# Patient Record
Sex: FEMALE | Race: BLACK OR AFRICAN AMERICAN | NOT HISPANIC OR LATINO | Employment: UNEMPLOYED | URBAN - METROPOLITAN AREA
[De-identification: names, ages, dates, MRNs, and addresses within clinical notes are randomized per-mention and may not be internally consistent; named-entity substitution may affect disease eponyms.]

---

## 2017-07-13 ENCOUNTER — TRANSFERRED RECORDS (OUTPATIENT)
Dept: HEALTH INFORMATION MANAGEMENT | Facility: CLINIC | Age: 27
End: 2017-07-13

## 2017-07-20 ENCOUNTER — TRANSFERRED RECORDS (OUTPATIENT)
Dept: HEALTH INFORMATION MANAGEMENT | Facility: CLINIC | Age: 27
End: 2017-07-20

## 2017-07-20 LAB
ABO + RH BLD: NORMAL
ABO + RH BLD: NORMAL
BLD GP AB SCN SERPL QL: NORMAL
C TRACH DNA SPEC QL PROBE+SIG AMP: NEGATIVE
HBV SURFACE AG SERPL QL IA: NORMAL
HIV 1+2 AB+HIV1 P24 AG SERPL QL IA: NORMAL
N GONORRHOEA DNA SPEC QL PROBE+SIG AMP: NEGATIVE
PAP SMEAR - HIM PATIENT REPORTED: NEGATIVE
PAP: NORMAL
RUBELLA ABY IGG: 8.15
RUBELLA ANTIBODY IGG QUANTITATIVE: NORMAL IU/ML
T PALLIDUM IGG SER QL: NORMAL

## 2017-08-25 ENCOUNTER — TRANSFERRED RECORDS (OUTPATIENT)
Dept: HEALTH INFORMATION MANAGEMENT | Facility: CLINIC | Age: 27
End: 2017-08-25

## 2017-08-25 LAB — HEMOGLOBIN: 10.5 G/DL (ref 11.7–15.7)

## 2017-09-08 ENCOUNTER — OFFICE VISIT (OUTPATIENT)
Dept: MIDWIFE SERVICES | Facility: CLINIC | Age: 27
End: 2017-09-08
Payer: MEDICAID

## 2017-09-08 VITALS
BODY MASS INDEX: 24.24 KG/M2 | TEMPERATURE: 98.1 F | HEIGHT: 64 IN | SYSTOLIC BLOOD PRESSURE: 112 MMHG | WEIGHT: 142 LBS | DIASTOLIC BLOOD PRESSURE: 60 MMHG

## 2017-09-08 DIAGNOSIS — R30.0 DYSURIA: Primary | ICD-10-CM

## 2017-09-08 DIAGNOSIS — Z3A.20 20 WEEKS GESTATION OF PREGNANCY: ICD-10-CM

## 2017-09-08 LAB
ALBUMIN UR-MCNC: NEGATIVE MG/DL
APPEARANCE UR: ABNORMAL
BACTERIA #/AREA URNS HPF: ABNORMAL /HPF
BILIRUB UR QL STRIP: NEGATIVE
COLOR UR AUTO: YELLOW
GLUCOSE UR STRIP-MCNC: NEGATIVE MG/DL
HGB UR QL STRIP: NEGATIVE
KETONES UR STRIP-MCNC: NEGATIVE MG/DL
LEUKOCYTE ESTERASE UR QL STRIP: NEGATIVE
MUCOUS THREADS #/AREA URNS LPF: PRESENT /LPF
NITRATE UR QL: NEGATIVE
NON-SQ EPI CELLS #/AREA URNS LPF: ABNORMAL /LPF
PH UR STRIP: 5.5 PH (ref 5–7)
RBC #/AREA URNS AUTO: ABNORMAL /HPF
SOURCE: ABNORMAL
SP GR UR STRIP: 1.02 (ref 1–1.03)
SPECIMEN SOURCE: NORMAL
UROBILINOGEN UR STRIP-ACNC: 0.2 EU/DL (ref 0.2–1)
WBC #/AREA URNS AUTO: ABNORMAL /HPF
WET PREP SPEC: NORMAL

## 2017-09-08 PROCEDURE — 81001 URINALYSIS AUTO W/SCOPE: CPT | Performed by: ADVANCED PRACTICE MIDWIFE

## 2017-09-08 PROCEDURE — 87210 SMEAR WET MOUNT SALINE/INK: CPT | Performed by: ADVANCED PRACTICE MIDWIFE

## 2017-09-08 PROCEDURE — 99202 OFFICE O/P NEW SF 15 MIN: CPT | Performed by: ADVANCED PRACTICE MIDWIFE

## 2017-09-08 PROCEDURE — 87086 URINE CULTURE/COLONY COUNT: CPT | Performed by: ADVANCED PRACTICE MIDWIFE

## 2017-09-08 RX ORDER — VITAMIN A ACETATE, BETA CAROTENE, ASCORBIC ACID, CHOLECALCIFEROL, .ALPHA.-TOCOPHEROL ACETATE, DL-, THIAMINE MONONITRATE, RIBOFLAVIN, NIACINAMIDE, PYRIDOXINE HYDROCHLORIDE, FOLIC ACID, CYANOCOBALAMIN, CALCIUM CARBONATE, FERROUS FUMARATE, ZINC OXIDE, CUPRIC OXIDE 3080; 12; 120; 400; 1; 1.84; 3; 20; 22; 920; 25; 200; 27; 10; 2 [IU]/1; UG/1; MG/1; [IU]/1; MG/1; MG/1; MG/1; MG/1; MG/1; [IU]/1; MG/1; MG/1; MG/1; MG/1; MG/1
TABLET, FILM COATED ORAL
COMMUNITY
End: 2017-12-26

## 2017-09-08 RX ORDER — NITROFURANTOIN 25; 75 MG/1; MG/1
100 CAPSULE ORAL 2 TIMES DAILY
Qty: 14 CAPSULE | Refills: 0 | Status: SHIPPED | OUTPATIENT
Start: 2017-09-08 | End: 2017-09-22

## 2017-09-08 NOTE — MR AVS SNAPSHOT
After Visit Summary   9/8/2017    Haritha Bateman    MRN: [id number]           Patient Information     Date Of Birth          1990        Visit Information        Provider Department      9/8/2017 10:00 AM Rylie Palomino APRN CNM [facility] [place]        Today's Diagnoses     Dysuria    -  1    20 weeks gestation of pregnancy          Care Instructions    Bladder Infection (UTI'S)    To help reduce the symptoms of your bladder infection:       Drink 8-10 glasses of water every day      Decrease caffeine, sugar and alcohol      Take all of the medication as it has been prescribed, don't stop before the last dose is gone      You may use OTC Uristat or Pyridium (this can turn your urine orange)  to soothe your bladder and reduce the burning but be aware that it may stain your clothing      Take Ibuprofen as directed for pain (not in pregnancy)      Take Vitamin C:  250-500 mg a day, Zinc: 30-50 mg day      Cranactin (tableted cranberry juice concentrate) take 1-2 tablets every 3-4 hours with plenty of water        To prevent future urinary tract infections:    AVOID CHEMICAL IRRITANTS:  Bath gels, perfumed products, deodorant pads or tampons, douching    CLOTHING: That increases moisture and bacterial growth:  nylon, Lycra, panty liners, tight clothing and thong underwear      ACIDIFY URINE: Cranberry tablets (Cranactin) or juice (naturally sweetened) to acidify urine and decrease bacterial growth.     URINATE:  Frequently and before and after intercourse.    If bladder infections are a common problem for you, consider washing your vaginal area before intercourse as well.       If symptoms persist or you experience fever, chills or back pain, please call:    [facility]   [phone]               Follow-ups after your visit        Follow-up notes from your care team     Return in about 2 weeks (around 9/22/2017) for 1st OB visit with [facility].      Your next 10  appointments already scheduled     Sep 22, 2017 10:00 AM CDT   US OB > 14 WEEKS COMPLETE SINGLE with WEUS1   [facility] ([facility])    [address]  [place] [id number] [phone]           Please bring a list of your medicines (including vitamins, minerals and over-the-counter drugs). Also, tell your doctor about any allergies you may have. Wear comfortable clothes and leave your valuables at home.  If you re less than 20 weeks drink four 8-ounce glasses of fluid an hour before your exam. If you need to empty your bladder before your exam, try to release only a little urine. Then, drink another glass of fluid.  You may have up to two family members in the exam room. If you bring a small child, an adult must be there to care for him or her.  Please call the Imaging Department at your exam site with any questions.            Sep 22, 2017 10:50 AM CDT   New Prenatal with ABIMAEL Bonds CNM, WE TRIAGE   [facility] ([facility]    [address]  [place] [id number] [phone]              Future tests that were ordered for you today     Open Future Orders        Priority Expected Expires Ordered    US OB > 14 Weeks Complete Single Routine 9/22/2017 9/9/2018 9/8/2017            Who to contact     If you have questions or need follow up information about today's clinic visit or your schedule please contact [facility] [place] directly at [phone].  Normal or non-critical lab and imaging results will be communicated to you by MyChart, letter or phone within 4 business days after the clinic has received the results. If you do not hear from us within 7 days, please contact the clinic through MyChart or phone. If you have a critical or abnormal lab result, we will notify you by phone as soon as possible.  Submit refill requests through ResQUhart or call your  "pharmacy and they will forward the refill request to us. Please allow 3 business days for your refill to be completed.          Additional Information About Your Visit        MyChart Information     Guidecentral lets you send messages to your doctor, view your test results, renew your prescriptions, schedule appointments and more. To sign up, go to [email or web address] . Click on \"Log in\" on the left side of the screen, which will take you to the Welcome page. Then click on \"Sign up Now\" on the right side of the page.     You will be asked to enter the access code listed below, as well as some personal information. Please follow the directions to create your username and password.     Your access code is: [email or web address]  Expires: 2017 10:51 AM     Your access code will  in 90 days. If you need help or a new code, please call your [place] clinic or [phone].        Care EveryWhere ID     This is your Care EveryWhere ID. This could be used by other organizations to access your [place] medical records  [id number]        Your Vitals Were     Temperature Height Last Period Breastfeeding? BMI (Body Mass Index)       98.1  F (36.7  C) (Oral) 5' 3.5\" (1.613 m) 2017 No 24.76 kg/m2        Blood Pressure from Last 3 Encounters:   17 112/60    Weight from Last 3 Encounters:   17 142 lb (64.4 kg)              We Performed the Following     UA with Microscopic     Urine Culture Aerobic Bacterial     Wet prep          Today's Medication Changes          These changes are accurate as of: 17 10:53 AM.  If you have any questions, ask your nurse or doctor.               Start taking these medicines.        Dose/Directions    nitroFURantoin (macrocrystal-monohydrate) 100 MG capsule   Commonly known as:  MACROBID   Used for:  Dysuria   Started by:  Rylie Palomino APRN CNM        Dose:  100 mg   Take 1 capsule (100 mg) by mouth 2 times daily   Quantity:  14 capsule   Refills:  0          "   Where to get your medicines      These medications were sent to [id number] - [place], [place] - [address] AT [facility] [address]  [address], [facility] [address]     Phone:  [phone]     nitroFURantoin (macrocrystal-monohydrate) 100 MG capsule                Primary Care Provider    None Specified       No primary provider on file.        Equal Access to Services     ADEOLA IBRAHIM : Hadii aad ku hadasho Soomaali, waaxda luqadaha, qaybta kaalmada adeegyada, waxay idiin haydishan adeeg nam amador . So [facility] [phone].    ATENCIÓN: Si kiana espcharlie, tiene a mishra disposición servicios gratuitos de asistencia lingüística. Llame al [phone].    We comply with applicable federal civil rights laws and [place] laws. We do not discriminate on the basis of race, color, national origin, age, disability sex, sexual orientation or gender identity.            Thank you!     Thank you for choosing [facility]  for your care. Our goal is always to provide you with excellent care. Hearing back from our patients is one way we can continue to improve our services. Please take a few minutes to complete the written survey that you may receive in the mail after your visit with us. Thank you!             Your Updated Medication List - Protect others around you: Learn how to safely use, store and throw away your medicines at www.disposemymeds.org.          This list is accurate as of: 9/8/17 10:53 AM.  Always use your most recent med list.                   Brand Name Dispense Instructions for use Diagnosis    nitroFURantoin (macrocrystal-monohydrate) 100 MG capsule    MACROBID    14 capsule    Take 1 capsule (100 mg) by mouth 2 times daily    Dysuria       PRENATAL PLUS 27-1 MG Tabs

## 2017-09-08 NOTE — PROGRESS NOTES
Wet prep negative. Made patient aware she would only receive a call if something came back positive.     Rylie VARGHESE CNM

## 2017-09-08 NOTE — PATIENT INSTRUCTIONS
Bladder Infection (UTI'S)    To help reduce the symptoms of your bladder infection:       Drink 8-10 glasses of water every day      Decrease caffeine, sugar and alcohol      Take all of the medication as it has been prescribed, don't stop before the last dose is gone      You may use OTC Uristat or Pyridium (this can turn your urine orange)  to soothe your bladder and reduce the burning but be aware that it may stain your clothing      Take Ibuprofen as directed for pain (not in pregnancy)      Take Vitamin C:  250-500 mg a day, Zinc: 30-50 mg day      Cranactin (tableted cranberry juice concentrate) take 1-2 tablets every 3-4 hours with plenty of water        To prevent future urinary tract infections:    AVOID CHEMICAL IRRITANTS:  Bath gels, perfumed products, deodorant pads or tampons, douching    CLOTHING: That increases moisture and bacterial growth:  nylon, Lycra, panty liners, tight clothing and thong underwear      ACIDIFY URINE: Cranberry tablets (Cranactin) or juice (naturally sweetened) to acidify urine and decrease bacterial growth.     URINATE:  Frequently and before and after intercourse.    If bladder infections are a common problem for you, consider washing your vaginal area before intercourse as well.       If symptoms persist or you experience fever, chills or back pain, please call:    [facility]   [phone]

## 2017-09-08 NOTE — PROGRESS NOTES
"SUBJECTIVE: Haritha Bateman is a 27 year old female who presents today with UTI symptoms: vaginal discharge, painful urination, cramps, x1 day.  Reports being ~18 weeks pregnant. Currently gets prenatal care at [facility]. Planning to transfer care to [facility].    URINARY TRACT SYMPTOMS     Onset: 1 day ago    Description:   Painful urination (Dysuria): Yes  Blood in urine (Hematuria): No  Urgency/Frequency: No    Progression of Symptoms:  same    Accompanying Signs & Symptoms:  Fever/chills: No  Flank pain: No  Nausea and vomiting: No  Any vaginal symptoms: Yes  Abdominal/Pelvic Pain: Yes Constant, throbbing lower abdominal pain   History:   History of frequent UTI's: No  History of kidney stones: No  Sexually Active: Yes  Possibility of pregnancy: Yes - Pt is pregnant  Contraceptive type:  none    Precipitating factors:            Therapies Tried and outcome: None      There is no problem list on file for this patient.    No past medical history on file.  No past surgical history on file.  Current Outpatient Prescriptions   Medication Sig Dispense Refill     Prenatal Vit-Fe Fumarate-FA (PRENATAL PLUS) 27-1 MG TABS        No Known Allergies    Health maintenance updated:  yes    ROS:  12 point review of systems negative other than symptoms noted below.  Genitourinary: Cramps, Painful Urination and Vaginal Discharge    PHYSICAL EXAM:  Vitals: /60  Temp 98.1  F (36.7  C) (Oral)  Ht 5' 3.5\" (1.613 m)  Wt 142 lb (64.4 kg)  LMP 05/04/2017  Breastfeeding? No  BMI 24.76 kg/m2  BMI= Body mass index is 24.76 kg/(m^2).  Patient appears well, afebrile.   ABD: Soft with supra pubic pain or tenderness  BACK: Neg CVAT.     No components found for: URINE      ASSESSMENT/PLAN:      ICD-10-CM    1. Dysuria R30.0 UA with Microscopic     Urine Culture Aerobic Bacterial     Wet prep   2. 20 weeks gestation of pregnancy Z3A.20 US OB > 14 Weeks Complete Single     Results for orders placed or performed in visit on " 09/08/17   UA with Microscopic   Result Value Ref Range    Color Urine Yellow     Appearance Urine Slightly Cloudy     Glucose Urine Negative NEG^Negative mg/dL    Bilirubin Urine Negative NEG^Negative    Ketones Urine Negative NEG^Negative mg/dL    Specific Gravity Urine 1.025 1.003 - 1.035    pH Urine 5.5 5.0 - 7.0 pH    Protein Albumin Urine Negative NEG^Negative mg/dL    Urobilinogen Urine 0.2 0.2 - 1.0 EU/dL    Nitrite Urine Negative NEG^Negative    Blood Urine Negative NEG^Negative    Leukocyte Esterase Urine Negative NEG^Negative    Source Midstream Urine     WBC Urine O - 2 OTO2^O - 2 /HPF    RBC Urine O - 2 OTO2^O - 2 /HPF    Squamous Epithelial /LPF Urine Few FEW^Few /LPF    Bacteria Urine Moderate (A) NEG^Negative /HPF    Mucous Urine Present (A) NEG^Negative /LPF       COUNSELING:  Given symptoms will treat for a UTI. UC pending  Prescription sent for Macrobid 100mg PO BID X 7 daysPush fluids    May use Uristat or other OTC med for dysuria  Reinforced the importance of completing this course of antibiotics   Use a probiotic when taking antibiotics  Handout provided regarding UTI prevention  RTC with continued symptoms or concerns  Transferring care to [facility]. Encouraged to send prenatal records to [facility] for review before next appointment.   Next appointment in 2 weeks with midwives; Fetal anatomy screen scheduled    Rylie VARGHESE CNM

## 2017-09-09 LAB
BACTERIA SPEC CULT: NORMAL
Lab: NORMAL
SPECIMEN SOURCE: NORMAL

## 2017-09-11 NOTE — PROGRESS NOTES
Please call to discuss UC results. She may continue with the Macrobid if desired, but it is not necessary to complete the course of antibiotics.     Rylie VARGHESE CNM

## 2017-09-22 ENCOUNTER — RADIANT APPOINTMENT (OUTPATIENT)
Dept: ULTRASOUND IMAGING | Facility: CLINIC | Age: 27
End: 2017-09-22
Attending: ADVANCED PRACTICE MIDWIFE
Payer: MEDICAID

## 2017-09-22 ENCOUNTER — PRENATAL OFFICE VISIT (OUTPATIENT)
Dept: MIDWIFE SERVICES | Facility: CLINIC | Age: 27
End: 2017-09-22
Attending: ADVANCED PRACTICE MIDWIFE
Payer: MEDICAID

## 2017-09-22 VITALS
BODY MASS INDEX: 24.65 KG/M2 | DIASTOLIC BLOOD PRESSURE: 72 MMHG | SYSTOLIC BLOOD PRESSURE: 104 MMHG | HEIGHT: 64 IN | HEART RATE: 77 BPM | WEIGHT: 144.4 LBS

## 2017-09-22 DIAGNOSIS — Z34.02 ENCOUNTER FOR SUPERVISION OF NORMAL FIRST PREGNANCY IN SECOND TRIMESTER: Primary | ICD-10-CM

## 2017-09-22 DIAGNOSIS — Z3A.20 20 WEEKS GESTATION OF PREGNANCY: ICD-10-CM

## 2017-09-22 PROCEDURE — 76805 OB US >/= 14 WKS SNGL FETUS: CPT | Performed by: OBSTETRICS & GYNECOLOGY

## 2017-09-22 PROCEDURE — 99213 OFFICE O/P EST LOW 20 MIN: CPT | Performed by: ADVANCED PRACTICE MIDWIFE

## 2017-09-22 ASSESSMENT — ANXIETY QUESTIONNAIRES
2. NOT BEING ABLE TO STOP OR CONTROL WORRYING: NOT AT ALL
5. BEING SO RESTLESS THAT IT IS HARD TO SIT STILL: NOT AT ALL
6. BECOMING EASILY ANNOYED OR IRRITABLE: NOT AT ALL
7. FEELING AFRAID AS IF SOMETHING AWFUL MIGHT HAPPEN: NOT AT ALL
1. FEELING NERVOUS, ANXIOUS, OR ON EDGE: NOT AT ALL
GAD7 TOTAL SCORE: 0
IF YOU CHECKED OFF ANY PROBLEMS ON THIS QUESTIONNAIRE, HOW DIFFICULT HAVE THESE PROBLEMS MADE IT FOR YOU TO DO YOUR WORK, TAKE CARE OF THINGS AT HOME, OR GET ALONG WITH OTHER PEOPLE: NOT DIFFICULT AT ALL
3. WORRYING TOO MUCH ABOUT DIFFERENT THINGS: NOT AT ALL

## 2017-09-22 ASSESSMENT — PATIENT HEALTH QUESTIONNAIRE - PHQ9
SUM OF ALL RESPONSES TO PHQ QUESTIONS 1-9: 0
5. POOR APPETITE OR OVEREATING: NOT AT ALL

## 2017-09-22 NOTE — PATIENT INSTRUCTIONS
Thank you for coming to see the Midwives at the   [facility]!      We will notify you about your labs that were drawn today once we get the results back or if you have [email or web address] they will be posted there as well      We will call you personally with results that require further discussion      If any referrals were ordered today you should be getting a call in the next week or you may need to call the number listed with your referral to schedule.            If you need any refills of medications please call your pharmacy and they will contact us      If you have any concerns about today's visit or wish to schedule another appointment, or have an urgent medical concern please call our office [phone]. You can also make appointments through [email or web address]        If you have a medical emergency please call 911.    Because you are pregnant, we have additional resources for you:      You may call our consulting RN's during normal business hours for non-urgent questions about your pregnancy at [phone]      After hours you may also call the clinic number above to be connected with [place]'s after hours triage RN.  She can page the midwife on call if needed. There is always a midwife on call 24 hours a day.    Prenatal Reminders:    Before 14 weeks: Dating ultrasound, Genetic testing       This ultrasound helps us determine your dates accurately. Verifi can be drawn anytime after 10 weeks of gestation  16 weeks: Optional genetic testing (quad screen) or single AFP       This testing helps understand your baby's risk for some genetic abnormalities.  20 weeks:  Screening ultrasound (fetal survey)       This testing will look for early growth abnormalities, and may tell the baby's sex if you wish to find out.  28 weeks: One hour sugar test (GCT), hemoglobin and platelets       This test helps identify diabetes of pregnancy or gestational diabetes.  We also look      at the iron in your blood and how well your  blood clots.  28 - 36 weeks: Tetanus shot (Tdap)       This shot helps protect you and your baby from tetanus and whooping cough.  36 weeks and later: Group B Strep test (GBS)       This test helps predict if you need antibiotics in labor to prevent infection in your baby.  Anytime September to April:  Flu shot       This shot helps protect you and your family from the flu.  This is especially important during pregnancy        Any time during or after your pregnancy you may experience increased depression and/or mood changes.    We are here to support you. Please contact us if you are:    Feeling anxious    Overwhelmed or sad     Trouble sleeping    Crying uncontrollably    Trouble caring for yourself or baby.    The typical schedule after your first visit today you can expect:     Visit 2 - 12-16 weeks  Visit 3 - 20 weeks  Visit 4 - 24 weeks  Visit 5 - 28 weeks  Visit 6 - 32 weeks  Visit 7 - 34 weeks  Visit 8 - 36 weeks  Weekly after 36 weeks until delivery.    If anything comes up between your visits or you have concerns please don't hesitate to contact us.    Secure access to your medical record:  Use Mobilinga (secure email communication and access to your chart) to send your primary care provider a message or make an appointment. Ask someone on your Team how to sign up for Mobilinga. To log on to [employer] or for more information in Mobilinga please visit the website at [email or web address].       Certified Nurse Midwife (CNM) Team  Elinor VARGHESE, AMY VARGHESE, AMY Hollis DNP, APRN, AMY VARGHESE, ABIMAEL Angel, ANAMARIA-BC, AMY Presley, SUZETTE, APRN, CNM      Again, thank you for choosing the midwives at [facility].  We are excited to be a part of your pregnancy. Please let us know how we can best partner with you to improve your and your family's health.

## 2017-09-22 NOTE — NURSING NOTE
[facility] Obstetrical Risk History    Patient presents for new OB labs and teaching.      1. Please indicate any condition you have or have had in the past:  none  2. Do you smoke?  No  If yes, how many packs/day?   3. Do you drink alcoholic beverages? No  If yes, how often?  What type?   4. List any medications taken since your last period: prenatal/ macrobid  5. List any recreational drugs (cocaine, marijuana, etc. used since your last period:None    6. List any chemical or radiation exposure that you've encountered: None  7. Are you on a restricted diet? No  If yes, please describe:  Do you have any Yarsanism objections to any form of treatment? No    GENETIC SCREENING    These questions apply to you, the baby's father, or anyone in either family with:    1. Patient's age 35 or greater at delivery? No  2. Maldivian, Estonian, Mediterranean ancestry? No  3. Neural Tube Defect (Meningomyelocele, Spina Bifida, or Anencephaly)? No  4. Orthodoxy, [place] or history of Reyes-Sachs disease? No  5. Down's Syndrome?   No  6. Hemophilia or clotting disorder? No  7. Muscular Dystrophy? No  8. Cystic Fibrosis? No  9. [place]'s Chorea? No  10. Mental Retardation? No  11. 3 or more miscarriages or a stillborn? No  12. Other inherited disease or chromosomal disorder? No  13. Have you or the baby's father had a child born with a birth defect? No  14. Did you or the baby's father have a birth defect yourselves? No    Do you have any other concerns about birth defects? No

## 2017-09-22 NOTE — MR AVS SNAPSHOT
After Visit Summary   9/22/2017    Haritha Bateman    MRN: [id number]           Patient Information     Date Of Birth          1990        Visit Information        Provider Department      9/22/2017 10:50 AM Romi Goldsmith APRN CNM; WE TRIAGE [facility]        Today's Diagnoses     Encounter for supervision of normal first pregnancy in second trimester    -  1      Care Instructions    Thank you for coming to see the Midwives at the   [facility]!      We will notify you about your labs that were drawn today once we get the results back or if you have BRAINhart they will be posted there as well      We will call you personally with results that require further discussion      If any referrals were ordered today you should be getting a call in the next week or you may need to call the number listed with your referral to schedule.            If you need any refills of medications please call your pharmacy and they will contact us      If you have any concerns about today's visit or wish to schedule another appointment, or have an urgent medical concern please call our office [phone]. You can also make appointments through [employer]        If you have a medical emergency please call 911.    Because you are pregnant, we have additional resources for you:      You may call our consulting RN's during normal business hours for non-urgent questions about your pregnancy at [phone]      After hours you may also call the clinic number above to be connected with [place]'s after hours triage RN.  She can page the midwife on call if needed. There is always a midwife on call 24 hours a day.    Prenatal Reminders:    Before 14 weeks: Dating ultrasound, Genetic testing       This ultrasound helps us determine your dates accurately. Verifi can be drawn anytime after 10 weeks of gestation  16 weeks: Optional genetic testing (quad screen) or single AFP       This  testing helps understand your baby's risk for some genetic abnormalities.  20 weeks:  Screening ultrasound (fetal survey)       This testing will look for early growth abnormalities, and may tell the baby's sex if you wish to find out.  28 weeks: One hour sugar test (GCT), hemoglobin and platelets       This test helps identify diabetes of pregnancy or gestational diabetes.  We also look      at the iron in your blood and how well your blood clots.  28 - 36 weeks: Tetanus shot (Tdap)       This shot helps protect you and your baby from tetanus and whooping cough.  36 weeks and later: Group B Strep test (GBS)       This test helps predict if you need antibiotics in labor to prevent infection in your baby.  Anytime September to April:  Flu shot       This shot helps protect you and your family from the flu.  This is especially important during pregnancy        Any time during or after your pregnancy you may experience increased depression and/or mood changes.    We are here to support you. Please contact us if you are:    Feeling anxious    Overwhelmed or sad     Trouble sleeping    Crying uncontrollably    Trouble caring for yourself or baby.    The typical schedule after your first visit today you can expect:     Visit 2 - 12-16 weeks  Visit 3 - 20 weeks  Visit 4 - 24 weeks  Visit 5 - 28 weeks  Visit 6 - 32 weeks  Visit 7 - 34 weeks  Visit 8 - 36 weeks  Weekly after 36 weeks until delivery.    If anything comes up between your visits or you have concerns please don't hesitate to contact us.    Secure access to your medical record:  Use [employer] (secure email communication and access to your chart) to send your primary care provider a message or make an appointment. Ask someone on your Team how to sign up for [employer]. To log on to [employer] or for more information in [employer] please visit the website at [email or web address].       Certified Nurse Midwife (CNM) Team  AMY Garay  AMY Hollis DNP, APRMARIKA, AMY VARGHESE, ABIMAEL Angel, CATHERINE-BC, CNPROMISE Presley, SUZETTE, APRN, CNPROMISE      Again, thank you for choosing the midwives at [facility].  We are excited to be a part of your pregnancy. Please let us know how we can best partner with you to improve your and your family's health.              Follow-ups after your visit        Your next 10 appointments already scheduled     Oct 17, 2017  3:40 PM CDT   ESTABLISHED PRENATAL with ABIMAEL Lopez CNM   [facility] ([facility])    [address] [place] [address] [address]  [facility] [address]   [phone]            Nov 14, 2017  2:50 PM CST   Glucose Tolerance Test with [facility]   [facility] ([facility])    [address] [place] [address] [address]  [facility] [address] [phone]            Nov 14, 2017  3:00 PM CST   ESTABLISHED PRENATAL with ABIMAEL Lopez CNM   [facility] ([facility])    [address] [place] [address] [address]  [facility] [address]   [phone]              Future tests that were ordered for you today     Open Future Orders        Priority Expected Expires Ordered    TSH Routine  9/22/2018 9/22/2017            Who to contact     If you have questions or need follow up information about today's clinic visit or your schedule please contact [facility] directly at [phone].  Normal or non-critical lab and imaging results will be communicated to you by MyChart, letter or phone within 4 business days after the clinic has received the results. If you do not hear from us within 7 days, please contact the clinic through MyChart or phone. If you have a critical or abnormal lab result, we will notify you by phone as soon as possible.  Submit refill requests through [employer] or call your pharmacy and they will forward the refill  "request to us. Please allow 3 business days for your refill to be completed.          Additional Information About Your Visit        MyChart Information     [employer] lets you send messages to your doctor, view your test results, renew your prescriptions, schedule appointments and more. To sign up, go to [email or web address] . Click on \"Log in\" on the left side of the screen, which will take you to the Welcome page. Then click on \"Sign up Now\" on the right side of the page.     You will be asked to enter the access code listed below, as well as some personal information. Please follow the directions to create your username and password.     Your access code is: [email or web address]  Expires: 2017 10:51 AM     Your access code will  in 90 days. If you need help or a new code, please call your [facility] clinic or [phone].        Care EveryWhere ID     This is your Care EveryWhere ID. This could be used by other organizations to access your [facility] medical records  [id number]        Your Vitals Were     Pulse Height Last Period BMI (Body Mass Index)          77 5' 4\" (1.626 m) 2017 24.79 kg/m2         Blood Pressure from Last 3 Encounters:   17 104/72   17 112/60    Weight from Last 3 Encounters:   17 144 lb 6.4 oz (65.5 kg)   17 142 lb (64.4 kg)              We Performed the Following     ABO and Rh     Anti Treponema     Chlamydia trachomatis PCR     Conventional pap smear, diagnostic     Hepatitis B surface antigen     HIV Antigen Antibody Combo     Neisseria gonorrhoeae PCR     OB hemoglobin     Rubella Antibody IgG Quantitative        Primary Care Provider    None Specified       No primary provider on file.        Equal Access to Services     [facility] PATRICE : Hadandrew Hamilton, falguni smith, cayetano abdullahi. So [facility] [phone].    ATENCIÓN: Si habla español, tiene a mishra disposición servicios gratuitos de " asistencia lingüística. Sherin al [phone].    We comply with applicable federal civil rights laws and [place] laws. We do not discriminate on the basis of race, color, national origin, age, disability sex, sexual orientation or gender identity.            Thank you!     Thank you for choosing [facility]  for your care. Our goal is always to provide you with excellent care. Hearing back from our patients is one way we can continue to improve our services. Please take a few minutes to complete the written survey that you may receive in the mail after your visit with us. Thank you!             Your Updated Medication List - Protect others around you: Learn how to safely use, store and throw away your medicines at www.disposemymeds.org.          This list is accurate as of: 9/22/17 11:39 AM.  Always use your most recent med list.                   Brand Name Dispense Instructions for use Diagnosis    PRENATAL PLUS 27-1 MG Tabs

## 2017-09-22 NOTE — PROGRESS NOTES
Haritha Bateman is a 27 year old  Stateless,  who is not a previous CNM patient. She presents for a transfer of care OB Visit. This was a planned pregnancy.     FOB is Leoncio who is in good health.  STEVIE IS actively involved in relationship and this pregnancy. Here with Leoncio and her sister and 9 month old nephew. She has no questions or concerns. Had 2 prenatal visits at her previous clinic.       She has not had bleeding since her LMP.    She denies abdominal pain since her LMP.  She has had nausea.  has not had vomiting.  Any personal or family history of blood clots? No  History of sickle cell anemia or trait? No- Had negative screening at last clinic         Patient's last menstrual period was 2017. Estimated Date of Delivery: 2018 Ultrasound consistent with LMP.    MENSTRUAL HISTORY    frequency: every 28 days  Last PAP:  17 NIL  History of abnormal Pap?  No    Health maintenance updated:  yes        Current medications are:    Current Outpatient Prescriptions:      Prenatal Vit-Fe Fumarate-FA (PRENATAL PLUS) 27-1 MG TABS, , Disp: , Rfl:      INFECTION HISTORY  HIV: No  Hepatitis B: No  Hepatitis C: No  Tuberculosis: No  Genital Herpes self: no  Herpes partner:  no  Chlamydia:  no  Gonorrhea:  no  HPV: No  BV:  No  Syphilis:  No  Chicken Pox: As a child      OB HISTORY  Obstetric History       T0      L0     SAB0   TAB0   Ectopic0   Multiple0   Live Births0       # Outcome Date GA Lbr Delfin/2nd Weight Sex Delivery Anes PTL Lv   1 Current                   PERSONAL HISTORY  Exercise Habits:  walking irregularly days per week.  Employment: Unemployed.   Travel plans:  are none planned.   Diet: eats regular meals  Abuse concerns? No  Hgb A1c screen:  BMI > 30: No, 1st degree family DM: No, History of GDM: No, PCOS: No, High risk ethnicity: No    Social History     Social History     Marital status:      Spouse name: N/A     Number of children: 0     Years of  "education: N/A     Occupational History     Not on file.     Social History Main Topics     Smoking status: Never Smoker     Smokeless tobacco: Never Used     Alcohol use No     Drug use: No     Sexual activity: Yes     Partners: Male     Birth control/ protection: None     Other Topics Concern     Not on file     Social History Narrative         She  reports that she has never smoked. She has never used smokeless tobacco.      STD testing offered?  Declined  Last PHQ-9 score on record =   PHQ-9 SCORE 2017   Total Score 0     Last GAD7 score on record =   BAKARI-7 SCORE 2017   Total Score 0     Alcohol Score = 0  Referral/Meds needed? no    PAST MEDICAL/SURGICAL HISTORY  History reviewed. No pertinent past medical history.  History reviewed. No pertinent surgical history.    FAMILY HISTORY  Family History   Problem Relation Age of Onset     Hypertension Mother      Hypertension Paternal Grandmother      Other Cancer Paternal Grandmother          ROS:  12 point review of systems negative other than symptoms noted below.  Gastrointestinal: Heartburn      PHYSICAL EXAM  Vitals: /72  Pulse 77  Ht 5' 4\" (1.626 m)  Wt 144 lb 6.4 oz (65.5 kg)  LMP 2017  BMI 24.79 kg/m2  BMI= Body mass index is 24.79 kg/(m^2).     GENERAL:  27 year old pleasant pregnant female, alert, cooperative and well groomed.  NECK:  Thyroid without enlargement and nodules.  Lymph nodes not palpable.   LUNGS:  Clear to auscultation.  BREAST:  Deferred  HEART:  RRR without murmur.  ABDOMEN: Soft without masses or tenderness.  No scars noted..  Pelvic deferred 20 weeks of gestation  LOWER EXTREMITIES: No edema. No significant varicosities.    ASSESSMENT/PLAN:    IUP at 20w1d    ICD-10-CM    1. Encounter for supervision of normal first pregnancy in second trimester Z34.02 TSH        consult for US for AMA patients: NA  Genetic Testing reviewed and discussed, patient declines    COUNSELING    Instructed on use of triage " nurse line and contacting the on call CNM after hours in an emergency.     Reviewed CNM philosophy, call schedule for labor and delivery, and FSH for delivery    1st OB handout given outlining appointment spacing and CNM information    Reviewed exercise and nutrition    Recommend to gain 25-35 pounds with her pregnancy.    Discussed OTC medications. OB med list given    Encouraged patient to take PNV's/DHA    Travel precautions discussed, no air travel after 36 weeks and Zika Virus discussed    Reviewed normal labs from first OB clinic    Does patient desire a RN home visit from the [facility]?  No    If yes, paperwork completed?      Will return to the clinic in 4 weeks for her next routine prenatal check.  Will call to be seen sooner if problems arise.    ABIMAEL Yusuf, CNM

## 2017-09-23 ASSESSMENT — ANXIETY QUESTIONNAIRES: GAD7 TOTAL SCORE: 0

## 2017-09-24 NOTE — PROGRESS NOTES
Results discussed directly with patient while patient was present. Any further details documented in the note.   ABIMAEL Aguirre CNM

## 2017-10-09 ENCOUNTER — TELEPHONE (OUTPATIENT)
Dept: NURSING | Facility: CLINIC | Age: 27
End: 2017-10-09

## 2017-10-09 NOTE — TELEPHONE ENCOUNTER
22w4d, JOSEPH 2/8/18. Pt has not had BM for 3 days. Pt is pushing so hard that she is having some rectal bleeding. Pt has increased fluids and is eating fresh fruits and vegetables. Pt tried Milk of magnesia which did not cause BM. Pt informed to try glycerin suppository. Pt informed to call back if no BM to day. Pt also informed to start Colace once has moved bowels. Pt stated bleeding is coming from rectum not vagina. Small amount of blood on toilet tissue. Denies cramping.

## 2017-10-17 ENCOUNTER — PRENATAL OFFICE VISIT (OUTPATIENT)
Dept: MIDWIFE SERVICES | Facility: CLINIC | Age: 27
End: 2017-10-17
Payer: MEDICAID

## 2017-10-17 VITALS — DIASTOLIC BLOOD PRESSURE: 74 MMHG | WEIGHT: 155 LBS | BODY MASS INDEX: 26.61 KG/M2 | SYSTOLIC BLOOD PRESSURE: 112 MMHG

## 2017-10-17 DIAGNOSIS — Z23 NEED FOR PROPHYLACTIC VACCINATION AND INOCULATION AGAINST INFLUENZA: ICD-10-CM

## 2017-10-17 DIAGNOSIS — Z34.02 ENCOUNTER FOR SUPERVISION OF NORMAL FIRST PREGNANCY IN SECOND TRIMESTER: Primary | ICD-10-CM

## 2017-10-17 PROCEDURE — 90471 IMMUNIZATION ADMIN: CPT | Performed by: NURSE PRACTITIONER

## 2017-10-17 PROCEDURE — 90686 IIV4 VACC NO PRSV 0.5 ML IM: CPT | Performed by: NURSE PRACTITIONER

## 2017-10-17 PROCEDURE — 99213 OFFICE O/P EST LOW 20 MIN: CPT | Mod: 25 | Performed by: NURSE PRACTITIONER

## 2017-10-17 NOTE — PROGRESS NOTES
Injectable Influenza Immunization Documentation    1.  Is the person to be vaccinated sick today?   No    2. Does the person to be vaccinated have an allergy to a component   of the vaccine?   No    3. Has the person to be vaccinated ever had a serious reaction   to influenza vaccine in the past?   No    4. Has the person to be vaccinated ever had Guillain-Barré syndrome?   No    Form completed by Karen Green CMA

## 2017-10-17 NOTE — MR AVS SNAPSHOT
After Visit Summary   10/17/2017    Haritha Bateman    MRN: [id number]           Patient Information     Date Of Birth          1990        Visit Information        Provider Department      10/17/2017 3:40 PM [facility]        Today's Diagnoses     Encounter for supervision of normal first pregnancy in second trimester    -  1      Care Instructions    SIGNS OF  LABOR    Labor is  if it happens more than three weeks before your due date.    It can be hard to know if you are in labor, since the symptoms can be like the normal feelings of pregnancy.  Often, the only difference is the symptoms increase or they don't go away.     Signs of  labor can include:      Contractions which can feel like period cramps or gas pain.  You may feel it in the lower part of your abdomen, in your back, or as a pressure feeling in your bottom.  It is often regular, coming every 5 or 10 minutes, and  lasting about 30-60 seconds. Some contractions are normal during pregnancy ([place] rico contractions) but if you are feeling more than 5-6 in one hour that is NOT normal    If this occurs empty your bladder, then drink 2-3 glasses of water, eat a snack, and lay down on your left side. Put your hand on your abdomen to count the contractions.  If after one hour of resting you have still had 5-6 contractions call your clinic right away.      If you feel a pop, gush, or trickle of fluid it may mean that your bag of water has broken and you should contact the clinic       You may also experience loose stools and/or rectal pressure       Listen to your body, if something doesn't seem right please call us at the clinic    Risk Factors      Previous  delivery    Bacterial Vaginosis- if you notice a fishy smell to your discharge or experience vaginal itching/discomfort you should be evaluated for infection    Smoking    Drug abuse    Adolescent (teen)  pregnancy or advanced maternal age (AMA) age 35 and over    Dehydration (this may not cause  labor but it can cause contractions)    If you think you are in  labor we may do some lab testing in the clinic or send you to the hospital for evaluation    Please call us if you are concerned you are in  labor.    [facility]  [phone]    GESTATIONAL DIABETES SCREENING    All pregnant women are screened at least once for diabetes as part of their prenatal care. A woman has gestational diabetes if she has high blood sugars for the first time during pregnancy.      Diabetes can harm your health and the health of your baby.  But if we find the diabetes early in pregnancy we will watch your health closely and prevent further problems.       We will check for gestational diabetes during 28 week visit. Please note you can not do this prior to 24 weeks of gestation.      Plan to spend about an hour at the clinic.  When you check in let us know that you will be having your diabetes screening that day.       We will give you a 50 gram glucose drink that you have 5 minutes to consume.  Exactly one hour later you will have draw blood from your arm to check your blood sugar level.      We will call you to let you know if your results are normal.  If the results are normal no more testing will be needed.  If your results are not normal we will discuss follow up testing with you.        You may eat prior to the testing but it is not recommended to eat or drink very sweet things such as pop, juice, candy or dessert type foods.  Eat a high protein, low carb meals prior to testing.    If you have any questions please call:    [facility]    [phone]    Iron Rich Foods  Iron is an important mineral for good health. Without enough iron you can get sick easily, get tired, and babies/children need iron for healthy brain development.    Recommended amount of Iron for Women  Ages 14-18  15  mg  Ages 19-49  18 mg   Over 50  8 mg  Pregnancy  27 mg  Breastfeeding  9 mg  Vegetarians   33 mg    There is extra iron in multivitamins and prenatal vitamins. Sometimes women can have a lower hemoglobin (part of your blood that carries oxygen) after menses and when pregnant. Adding some of these iron rich foods to your diet can help you feel better, bring your iron levels up without supplementing with iron pills or liquids. If you are already supplementing with iron these food will only help!    Iron Rich Foods:    Meat (2.5 oz servings range anywhere from 0.6-21 mg of Iron)  Clams      Liver (chicken/pork)     Oysters    Mussels       Beef liver    Beef       Shrimp     Sardines     Tuna/herring/mackerel   Chicken  Pork     Turkey/Lamb  Godfrey     Flounder/sole  Vegtables  Dark green leafy vegetables like kale/mixed greens /swiss chard  Broccoli      Asparagus  Green peas      Beets  Potato (with skin)  Beans   Chickpeas      Mena Beans  Soy beans      Kidney beans   Lentils       Refried beans   Grains  Whole wheat bread     Bagels  Pasta (enriched)     Quinoa  Cereal       Shredded wheat  Cream of wheat  (12 mg)    Oatmeal  Pearled barley      Wheat germ (toasted)  Other  pumpkin seeds     Tofu  Raisins       Peanut butter  Tahini        Eggs  Sour Cherries      Prune juice                Follow-ups after your visit        Follow-up notes from your care team     Return in about 4 weeks (around 11/14/2017) for Prenatal visit.      Your next 10 appointments already scheduled     Nov 14, 2017  2:50 PM CST   Glucose Tolerance Test with [facility]   [facility] [place] ([facility] [place])    [address] [id number] [phone]            Nov 14, 2017  3:00 PM CST   ESTABLISHED PRENATAL with ABIMAEL Lopez CNM   [facility] [place] ([facility] [place])    [address] [address]  "  [phone]              Who to contact     If you have questions or need follow up information about today's clinic visit or your schedule please contact [facility] directly at [phone].  Normal or non-critical lab and imaging results will be communicated to you by MyChart, letter or phone within 4 business days after the clinic has received the results. If you do not hear from us within 7 days, please contact the clinic through MyChart or phone. If you have a critical or abnormal lab result, we will notify you by phone as soon as possible.  Submit refill requests through [employer] or call your pharmacy and they will forward the refill request to us. Please allow 3 business days for your refill to be completed.          Additional Information About Your Visit        [employer] Information     [employer] lets you send messages to your doctor, view your test results, renew your prescriptions, schedule appointments and more. To sign up, go to [email or web address] . Click on \"Log in\" on the left side of the screen, which will take you to the Welcome page. Then click on \"Sign up Now\" on the right side of the page.     You will be asked to enter the access code listed below, as well as some personal information. Please follow the directions to create your username and password.     Your access code is: [email or web address]  Expires: 2017 10:51 AM     Your access code will  in 90 days. If you need help or a new code, please call your [place] clinic or [phone].        Care EveryWhere ID     This is your Care EveryWhere ID. This could be used by other organizations to access your [place] medical records  [id number]        Your Vitals Were     Last Period BMI (Body Mass Index)                2017 26.61 kg/m2           Blood Pressure from Last 3 Encounters:   10/17/17 112/74   17 104/72   17 112/60    Weight from Last 3 Encounters:   10/17/17 155 lb (70.3 kg)   17 144 " lb 6.4 oz (65.5 kg)   09/08/17 142 lb (64.4 kg)              Today, you had the following     No orders found for display       Primary Care Provider    None Specified       No primary provider on file.        Equal Access to Services     ADEOLA IBRAHIM : Hadii aad ku hadlongo Soleoncio, wamalikada luqadaha, qaybta kaalmada neri, cayetano katiein hayaavlad moore nam marjorie snyder. So [facility] [phone].    ATENCIÓN: Si habla español, tiene a mishra disposición servicios gratuitos de asistencia lingüística. Llame al [phone].    We comply with applicable federal civil rights laws and [place] laws. We do not discriminate on the basis of race, color, national origin, age, disability, sex, sexual orientation, or gender identity.            Thank you!     Thank you for choosing [facility]  for your care. Our goal is always to provide you with excellent care. Hearing back from our patients is one way we can continue to improve our services. Please take a few minutes to complete the written survey that you may receive in the mail after your visit with us. Thank you!             Your Updated Medication List - Protect others around you: Learn how to safely use, store and throw away your medicines at www.disposemymeds.org.          This list is accurate as of: 10/17/17  4:10 PM.  Always use your most recent med list.                   Brand Name Dispense Instructions for use Diagnosis    PRENATAL PLUS 27-1 MG Tabs

## 2017-10-17 NOTE — PROGRESS NOTES
Feels well.  Here with Leoncio today.   Fetal movement: positive   Denies loss of fluid/vb/contractions  GCT next visit, handout provided, reminded of longer appointment  Tdap next visit; reviewed CDC recommendations and partner/family vaccination recommended as well  Need for Rhogam? NA; Rh positive  Flu shot done today     Return to clinic 4 weeks 28week labs and OB visit    Angle VARGHESE CNM

## 2017-10-17 NOTE — PATIENT INSTRUCTIONS
SIGNS OF  LABOR    Labor is  if it happens more than three weeks before your due date.    It can be hard to know if you are in labor, since the symptoms can be like the normal feelings of pregnancy.  Often, the only difference is the symptoms increase or they don't go away.     Signs of  labor can include:      Contractions which can feel like period cramps or gas pain.  You may feel it in the lower part of your abdomen, in your back, or as a pressure feeling in your bottom.  It is often regular, coming every 5 or 10 minutes, and  lasting about 30-60 seconds. Some contractions are normal during pregnancy ([place] rico contractions) but if you are feeling more than 5-6 in one hour that is NOT normal    If this occurs empty your bladder, then drink 2-3 glasses of water, eat a snack, and lay down on your left side. Put your hand on your abdomen to count the contractions.  If after one hour of resting you have still had 5-6 contractions call your clinic right away.      If you feel a pop, gush, or trickle of fluid it may mean that your bag of water has broken and you should contact the clinic       You may also experience loose stools and/or rectal pressure       Listen to your body, if something doesn't seem right please call us at the clinic    Risk Factors      Previous  delivery    Bacterial Vaginosis- if you notice a fishy smell to your discharge or experience vaginal itching/discomfort you should be evaluated for infection    Smoking    Drug abuse    Adolescent (teen) pregnancy or advanced maternal age (AMA) age 35 and over    Dehydration (this may not cause  labor but it can cause contractions)    If you think you are in  labor we may do some lab testing in the clinic or send you to the hospital for evaluation    Please call us if you are concerned you are in  labor.    [facility]  [phone]    GESTATIONAL DIABETES SCREENING    All pregnant women  are screened at least once for diabetes as part of their prenatal care. A woman has gestational diabetes if she has high blood sugars for the first time during pregnancy.      Diabetes can harm your health and the health of your baby.  But if we find the diabetes early in pregnancy we will watch your health closely and prevent further problems.       We will check for gestational diabetes during 28 week visit. Please note you can not do this prior to 24 weeks of gestation.      Plan to spend about an hour at the clinic.  When you check in let us know that you will be having your diabetes screening that day.       We will give you a 50 gram glucose drink that you have 5 minutes to consume.  Exactly one hour later you will have draw blood from your arm to check your blood sugar level.      We will call you to let you know if your results are normal.  If the results are normal no more testing will be needed.  If your results are not normal we will discuss follow up testing with you.        You may eat prior to the testing but it is not recommended to eat or drink very sweet things such as pop, juice, candy or dessert type foods.  Eat a high protein, low carb meals prior to testing.    If you have any questions please call:    [facility]    [phone]    Iron Rich Foods  Iron is an important mineral for good health. Without enough iron you can get sick easily, get tired, and babies/children need iron for healthy brain development.    Recommended amount of Iron for Women  Ages 14-18  15 mg  Ages 19-49  18 mg   Over 50  8 mg  Pregnancy  27 mg  Breastfeeding  9 mg  Vegetarians   33 mg    There is extra iron in multivitamins and prenatal vitamins. Sometimes women can have a lower hemoglobin (part of your blood that carries oxygen) after menses and when pregnant. Adding some of these iron rich foods to your diet can help you feel better, bring your iron levels up without supplementing with iron pills or  liquids. If you are already supplementing with iron these food will only help!    Iron Rich Foods:    Meat (2.5 oz servings range anywhere from 0.6-21 mg of Iron)  Clams      Liver (chicken/pork)     Oysters    Mussels       Beef liver    Beef       Shrimp     Sardines     Tuna/herring/mackerel   Chicken  Pork     Turkey/Lamb  [place]     Flounder/sole  Vegtables  Dark green leafy vegetables like kale/mixed greens /swiss chard  Broccoli      Asparagus  Green peas      Beets  Potato (with skin)  Beans   Chickpeas      Mena Beans  Soy beans      Kidney beans   Lentils       Refried beans   Grains  Whole wheat bread     Bagels  Pasta (enriched)     Quinoa  Cereal       Shredded wheat  Cream of wheat  (12 mg)    Oatmeal  Pearled barley      Wheat germ (toasted)  Other  pumpkin seeds     Tofu  Raisins       Peanut butter  Tahini        Eggs  Sour Cherries      Prune juice

## 2017-11-10 ENCOUNTER — NURSE TRIAGE (OUTPATIENT)
Dept: NURSING | Facility: CLINIC | Age: 27
End: 2017-11-10

## 2017-11-10 ENCOUNTER — TELEPHONE (OUTPATIENT)
Dept: MIDWIFE SERVICES | Facility: CLINIC | Age: 27
End: 2017-11-10

## 2017-11-10 NOTE — TELEPHONE ENCOUNTER
Clinic Action Needed:Yes, please call patient at   Reason for Call:Patient reporting difficulty sleeping for the past 4 nights. Stating she is only getting 3 hours of sleep.  Reporting she has attempted to exercise to get more tired for sleep with no improvement.  Patient is 27 weeks gestation. Requesting to know if anything could be prescribed or recommended for sleep?  [facility]ed to:[facility]    Adilene Boateng RN  [place] [facility]

## 2017-11-10 NOTE — TELEPHONE ENCOUNTER
Clinic Action Needed:Yes, please call patient at   Reason for Call:Patient reporting difficulty sleeping for the past 4 nights. Stating she is only getting 3 hours of sleep.  Reporting she has attempted to exercise to get more tired for sleep with no improvement.  Patient is 27 weeks gestation. Requesting to know if anything could be prescribed or recommended for sleep?  Routed to:[facility]    Adilene Boateng RN  [place] [facility]

## 2017-11-10 NOTE — TELEPHONE ENCOUNTER
27w1d. Pt states she has been doing exercise during the day.  Pt does not drink caffeine at night. Pt reports fetal movement, denies vaginal bleeding or LOF.  Informed pt she could try taking a warm bath before bed, drink sleepytime tea, and could take unisom 1/2 tablet OTC. Informed pt to call back if still unable to sleep. Pt stated understanding and had no further questions.

## 2017-11-12 ENCOUNTER — NURSE TRIAGE (OUTPATIENT)
Dept: NURSING | Facility: CLINIC | Age: 27
End: 2017-11-12

## 2017-11-12 NOTE — TELEPHONE ENCOUNTER
"Patient calling reporting \"sharp back pain\" starting yesterday. Reports pain is mid back \"on spine.\"  Rating pain \"5\" on 0-10. Patient is taking Tylenol for pain.  Denies other symptoms. Denies any known injury. Fetal movement is positive.   Patient has appointment for 11/14/17. Agrees to call back with any increase in symptoms prior to appointment.    Adilene Boateng RN  [place] [facility]      "

## 2017-11-12 NOTE — TELEPHONE ENCOUNTER
Additional Information    Back pain during pregnancy    Negative: Shock suspected (e.g., cold/pale/clammy skin, too weak to stand, low BP, rapid pulse)    Negative: SEVERE abdominal pain    Negative: Sounds like a life-threatening emergency to the triager    Negative: Major injury to the back (e.g., MVA, fall > 10 feet or 3 meters, penetrating injury, etc.)    Negative: Followed a tailbone injury    Negative: [1] Having contractions or other symptoms of labor AND [2] >= 37 weeks pregnant (i.e., term pregnancy)    Negative: [1] Having contractions or other symptoms of labor AND [2] < 37 weeks pregnant (i.e., )    Negative: [1] Abdominal pain AND [2] pregnant > 20 weeks    Negative: [1] Abdominal pain AND [2] pregnant < 20 weeks    Negative: [1] Pain in the upper back AND [2] overlies the rib cage    Negative: [1] Pain in the upper back AND [2] worsened by coughing (or clearly increases with breathing)    Negative: [1] Unable to urinate (or only a few drops) > 4 hours AND     [2] bladder feels very full (e.g., palpable bladder or strong urge to urinate)    Negative: Numbness in groin or rectal area (i.e., loss of sensation)    Negative: Leakage of fluid from vagina    Negative: [1] Pregnant 23 or more weeks AND [2] baby is moving less today (e.g., kick count < 5 in 1 hour or < 10 in 2 hours)    Negative: Weakness of a leg or foot (e.g., unable to bear weight, dragging foot)    Negative: Unable to walk    Negative: Patient sounds very sick or weak to the triager    Negative: [1] SEVERE back pain (e.g., excruciating, unable to do any normal activities) AND [2] not improved 2 hours after pain medicine    Negative: [1] Pain radiates into the thigh or further down the leg AND [2] both legs    Negative: [1] Flank pain (i.e., in side, below ribs and above hip) AND [2] fever > 100.5 F (38.1 C)    Negative: Pain or burning with urination    Negative: Numbness in a leg or foot (i.e., loss of sensation)    Negative:  "High-risk adult (e.g., history of cancer, HIV, or IV drug abuse)    Negative: Flank pain  (Exception: pain that is only present with movement)    Negative: Rash in same area as pain (may be described as \"small blisters\")    Negative: Blood in urine (red, pink, or tea-colored)    Negative: [1] Increase in vaginal discharge AND [2] < 37 weeks pregnant (i.e., )    Negative: [1] MODERATE back pain (e.g., interferes with normal activities or sleep) AND [2] present > 3 days    Negative: [1] Pain radiates into the thigh or further down the leg AND [2] one leg    Negative: Back pain present > 2 weeks    Negative: Caused by a twisting, bending, or lifting injury (all triage questions negative)    Negative: Caused by overuse from recent vigorous activity (e.g., exercise, gardening, lifting and carrying, sports)  (all triage questions negative)    Back pain  (all triage questions negative)    Protocols used: BACK PAIN-ADULT-, PREGNANCY - BACK PAIN-ADULT-    "

## 2017-11-14 ENCOUNTER — PRENATAL OFFICE VISIT (OUTPATIENT)
Dept: MIDWIFE SERVICES | Facility: CLINIC | Age: 27
End: 2017-11-14
Payer: MEDICAID

## 2017-11-14 VITALS — WEIGHT: 160 LBS | BODY MASS INDEX: 27.46 KG/M2 | DIASTOLIC BLOOD PRESSURE: 72 MMHG | SYSTOLIC BLOOD PRESSURE: 112 MMHG

## 2017-11-14 DIAGNOSIS — Z23 NEED FOR TDAP VACCINATION: ICD-10-CM

## 2017-11-14 DIAGNOSIS — Z34.03 ENCOUNTER FOR SUPERVISION OF NORMAL FIRST PREGNANCY IN THIRD TRIMESTER: Primary | ICD-10-CM

## 2017-11-14 DIAGNOSIS — O99.013 ANEMIA AFFECTING PREGNANCY IN THIRD TRIMESTER: ICD-10-CM

## 2017-11-14 DIAGNOSIS — Z34.02 ENCOUNTER FOR SUPERVISION OF NORMAL FIRST PREGNANCY IN SECOND TRIMESTER: Primary | ICD-10-CM

## 2017-11-14 LAB
ERYTHROCYTE [DISTWIDTH] IN BLOOD BY AUTOMATED COUNT: 14.3 % (ref 10–15)
GLUCOSE 1H P 50 G GLC PO SERPL-MCNC: 103 MG/DL (ref 60–129)
HCT VFR BLD AUTO: 30 % (ref 35–47)
HGB BLD-MCNC: 9.9 G/DL (ref 11.7–15.7)
MCH RBC QN AUTO: 27.9 PG (ref 26.5–33)
MCHC RBC AUTO-ENTMCNC: 33 G/DL (ref 31.5–36.5)
MCV RBC AUTO: 85 FL (ref 78–100)
PLATELET # BLD AUTO: 236 10E9/L (ref 150–450)
RBC # BLD AUTO: 3.55 10E12/L (ref 3.8–5.2)
WBC # BLD AUTO: 7.8 10E9/L (ref 4–11)

## 2017-11-14 PROCEDURE — 84443 ASSAY THYROID STIM HORMONE: CPT | Performed by: ADVANCED PRACTICE MIDWIFE

## 2017-11-14 PROCEDURE — 99207 ZZC PRENATAL VISIT: CPT | Performed by: ADVANCED PRACTICE MIDWIFE

## 2017-11-14 PROCEDURE — 83550 IRON BINDING TEST: CPT | Performed by: PATHOLOGY

## 2017-11-14 PROCEDURE — 86780 TREPONEMA PALLIDUM: CPT | Performed by: ADVANCED PRACTICE MIDWIFE

## 2017-11-14 PROCEDURE — 36415 COLL VENOUS BLD VENIPUNCTURE: CPT | Performed by: ADVANCED PRACTICE MIDWIFE

## 2017-11-14 PROCEDURE — 85027 COMPLETE CBC AUTOMATED: CPT | Performed by: ADVANCED PRACTICE MIDWIFE

## 2017-11-14 PROCEDURE — 83540 ASSAY OF IRON: CPT | Performed by: PATHOLOGY

## 2017-11-14 PROCEDURE — 86803 HEPATITIS C AB TEST: CPT | Performed by: ADVANCED PRACTICE MIDWIFE

## 2017-11-14 PROCEDURE — 85045 AUTOMATED RETICULOCYTE COUNT: CPT | Performed by: ADVANCED PRACTICE MIDWIFE

## 2017-11-14 PROCEDURE — 82728 ASSAY OF FERRITIN: CPT | Performed by: PATHOLOGY

## 2017-11-14 PROCEDURE — 90471 IMMUNIZATION ADMIN: CPT

## 2017-11-14 PROCEDURE — 82950 GLUCOSE TEST: CPT | Performed by: ADVANCED PRACTICE MIDWIFE

## 2017-11-14 PROCEDURE — 90715 TDAP VACCINE 7 YRS/> IM: CPT

## 2017-11-14 NOTE — PROGRESS NOTES
Feels well, having some trouble sleeping and some back pain  Discussed sleep hygiene and comfort measures for back pain such as heat, ice, chiropractor, and massage, option for support belt  Fetal movement: positive, denies loss of fluid/vb/contractions  GCT, hemoglobin and platelets drawn today  Tdap given: Yes  Rhogam: No  Anti Treponema drawn: Yes  Hep C drawn: Yes  Water birth consent signed: given for review    Reviewed PTL precautions and S&S of PI[facility], patient verbalizes understanding and what to report  Hospital Registration reminder-online  Meet the midwife handout given  Return to clinic 2 weeks    ABIMAEL Yusuf, AMY

## 2017-11-14 NOTE — PATIENT INSTRUCTIONS
Iron Rich Foods  Iron is an important mineral for good health. Without enough iron you can get sick easily, get tired, and babies/children need iron for healthy brain development.    Recommended amount of Iron for Women  Ages 14-18  15 mg  Ages 19-49  18 mg   Over 50  8 mg  Pregnancy  27 mg  Breastfeeding  9 mg  Vegetarians   33 mg    There is extra iron in multivitamins and prenatal vitamins. Sometimes women can have a lower hemoglobin (part of your blood that carries oxygen) after menses and when pregnant. Adding some of these iron rich foods to your diet can help you feel better, bring your iron levels up without supplementing with iron pills or liquids. If you are already supplementing with iron these food will only help!    Iron Rich Foods:    Meat (2.5 oz servings range anywhere from 0.6-21 mg of Iron)  Clams      Liver (chicken/pork)     Oysters    Mussels       Beef liver    Beef       Shrimp     Sardines     Tuna/herring/mackerel   Chicken  Pork     Turkey/Lamb  [place]     Flounder/sole  Vegtables  Dark green leafy vegetables like kale/mixed greens /swiss chard  Broccoli      Asparagus  Green peas      Beets  Potato (with skin)  Beans   Chickpeas      Mena Beans  Soy beans      Kidney beans   Lentils       Refried beans   Grains  Whole wheat bread     Bagels  Pasta (enriched)     Quinoa  Cereal       Shredded wheat  Cream of wheat  (12 mg)    Oatmeal  Pearled barley      Wheat germ (toasted)  Other  pumpkin seeds     Tofu  Raisins       Peanut butter  Tahini        Eggs  Sour Cherries      Prune juice      Please start Ferrous sulfate 325 mg by mouth daily  Along with Vitamin C 250 mg by mouth  Along with Vitamin B12 1000 ug by mouth

## 2017-11-14 NOTE — MR AVS SNAPSHOT
After Visit Summary   11/14/2017    Haritha Bateman    MRN: [id number]           Patient Information     Date Of Birth          1990        Visit Information        Provider Department      11/14/2017 3:00 PM Romi Goldsmith APRN CNM [facility]        Today's Diagnoses     Encounter for supervision of normal first pregnancy in third trimester    -  1    Anemia affecting pregnancy in third trimester          Care Instructions    Iron Rich Foods  Iron is an important mineral for good health. Without enough iron you can get sick easily, get tired, and babies/children need iron for healthy brain development.    Recommended amount of Iron for Women  Ages 14-18  15 mg  Ages 19-49  18 mg   Over 50  8 mg  Pregnancy  27 mg  Breastfeeding  9 mg  Vegetarians   33 mg    There is extra iron in multivitamins and prenatal vitamins. Sometimes women can have a lower hemoglobin (part of your blood that carries oxygen) after menses and when pregnant. Adding some of these iron rich foods to your diet can help you feel better, bring your iron levels up without supplementing with iron pills or liquids. If you are already supplementing with iron these food will only help!    Iron Rich Foods:    Meat (2.5 oz servings range anywhere from 0.6-21 mg of Iron)  Clams      Liver (chicken/pork)     Oysters    Mussels       Beef liver    Beef       Shrimp     Sardines     Tuna/herring/mackerel   Chicken  Pork     Turkey/Lamb  Conner     Flounder/sole  Vegtables  Dark green leafy vegetables like kale/mixed greens /swiss chard  Broccoli      Asparagus  Green peas      Beets  Potato (with skin)  Beans   Chickpeas      Mena Beans  Soy beans      Kidney beans   Lentils       Refried beans   Grains  Whole wheat bread     Bagels  Pasta (enriched)     Quinoa  Cereal       Shredded wheat  Cream of wheat  (12 mg)    Oatmeal  Pearled barley      Wheat germ (toasted)  Other  pumpkin seeds     Tofu  Raisins  "      Peanut butter  Tahini        Eggs  Sour Cherries      Prune juice      Please start Ferrous sulfate 325 mg by mouth daily  Along with Vitamin C 250 mg by mouth  Along with Vitamin B12 1000 ug by mouth          Follow-ups after your visit        Follow-up notes from your care team     Return in about 2 weeks (around 11/28/2017).      Your next 10 appointments already scheduled     Nov 28, 2017 10:30 AM CST   ESTABLISHED PRENATAL with ABIMAEL Bonds CNM   [facility]    [address] [place] [id number] [phone]              Who to contact     If you have questions or need follow up information about today's clinic visit or your schedule please contact [facility] directly at [phone].  Normal or non-critical lab and imaging results will be communicated to you by Net Transmit & Receivehart, letter or phone within 4 business days after the clinic has received the results. If you do not hear from us within 7 days, please contact the clinic through MyChart or phone. If you have a critical or abnormal lab result, we will notify you by phone as soon as possible.  Submit refill requests through [employer] or call your pharmacy and they will forward the refill request to us. Please allow 3 business days for your refill to be completed.          Additional Information About Your Visit        Net Transmit & ReceivehareGenerations Information     Boyibang lets you send messages to your doctor, view your test results, renew your prescriptions, schedule appointments and more. To sign up, go to [email or web address] . Click on \"Log in\" on the left side of the screen, which will take you to the Welcome page. Then click on \"Sign up Now\" on the right side of the page.     You will be asked to enter the access code listed below, as well as some personal information. Please follow the directions to create your username and password.     Your access code is: " [email or web address]  Expires: 2017  9:51 AM     Your access code will  in 90 days. If you need help or a new code, please call your [place] clinic or [phone].        Care EveryWhere ID     This is your Care EveryWhere ID. This could be used by other organizations to access your [place] medical records  [id number]        Your Vitals Were     Last Period BMI (Body Mass Index)                2017 27.46 kg/m2           Blood Pressure from Last 3 Encounters:   17 112/72   10/17/17 112/74   17 104/72    Weight from Last 3 Encounters:   17 160 lb (72.6 kg)   10/17/17 155 lb (70.3 kg)   17 144 lb 6.4 oz (65.5 kg)              We Performed the Following     Anti Treponema     Ferritin     Hepatitis C antibody     Iron and iron binding capacity     Reticulocyte count     TSH with free T4 reflex        Primary Care Provider    Physician No Ref-Primary       NO REF-PRIMARY PHYSICIAN        Equal Access to Services     ADEOLA IBRAHIM : Hadii aad ku hadasho Soomaali, waaxda luqadaha, qaybta kaalmada adeegyada, waxay andry amador . So [facility] [phone].    ATENCIÓN: Si habla español, tiene a mishra disposición servicios gratuitos de asistencia lingüística. Llame al [phone].    We comply with applicable federal civil rights laws and [place] laws. We do not discriminate on the basis of race, color, national origin, age, disability, sex, sexual orientation, or gender identity.            Thank you!     Thank you for choosing [facility]  for your care. Our goal is always to provide you with excellent care. Hearing back from our patients is one way we can continue to improve our services. Please take a few minutes to complete the written survey that you may receive in the mail after your visit with us. Thank you!             Your Updated Medication List - Protect others around you: Learn how to safely use, store and throw away your medicines at  www.disposemymeds.org.          This list is accurate as of: 11/14/17  3:52 PM.  Always use your most recent med list.                   Brand Name Dispense Instructions for use Diagnosis    PRENATAL PLUS 27-1 MG Tabs

## 2017-11-14 NOTE — PROGRESS NOTES
Syphilis is a sexually transmitted disease that can cause birth defects in the babies of untreated mothers. Every pregnant patient is tested for syphilis early in each pregnancy as part of the routine lab work. The [place] Department of [facility] has seen an increase in the rate of syphilis in [place]. The [facility] now recommends testing for syphilis 3 times during a pregnancy, the new prenatal visit, 28 weeks and when admitted for delivery. Patient accepts lab testing for syphilis.

## 2017-11-14 NOTE — PROGRESS NOTES
Informed by  before patient left clinic her hgb was 9.9. Anemic labs added on. AVS on iron rich foods provided to patient. Encouraged patient to begin Ferrous Sulfate 325mg PO daily/Vit C 250 mg PO daily/Vit B12 1000ug PO daily.  Discussed interventions for best absorption. Reviewed side effects and interventions to help with side effects. Will repeat CBC in a few weeks. Discussed possibility of needing a iron transfusion.     Rylie VARGHESE CNM

## 2017-11-15 LAB
FERRITIN SERPL-MCNC: 5 NG/ML (ref 12–150)
HCV AB SERPL QL IA: NONREACTIVE
IRON SATN MFR SERPL: 7 % (ref 15–46)
IRON SERPL-MCNC: 35 UG/DL (ref 35–180)
RETICS # AUTO: 84.3 10E9/L (ref 25–95)
RETICS/RBC NFR AUTO: 2.3 % (ref 0.5–2)
TIBC SERPL-MCNC: 488 UG/DL (ref 240–430)
TSH SERPL DL<=0.005 MIU/L-ACNC: 1.46 MU/L (ref 0.4–4)

## 2017-11-16 DIAGNOSIS — Z3A.28 28 WEEKS GESTATION OF PREGNANCY: Primary | ICD-10-CM

## 2017-11-16 DIAGNOSIS — O99.013 ANEMIA COMPLICATING PREGNANCY IN THIRD TRIMESTER: ICD-10-CM

## 2017-11-16 LAB — T PALLIDUM IGG+IGM SER QL: NEGATIVE

## 2017-11-16 NOTE — PROGRESS NOTES
Call JUAN JOSÉ bland. Asked to call clinic back to discuss iron study results. Will recommend iron transfusions. Waiting to speak with Haritha before faxing over order form.     Rylie VARGHESE CNM

## 2017-11-16 NOTE — PROGRESS NOTES
Call placed to review all lab results. Aware of low iron studies. Agreeable to Iron transfusions. Will fax over order form.     Rylie VARGHESE CNM

## 2017-11-17 ENCOUNTER — TELEPHONE (OUTPATIENT)
Dept: MIDWIFE SERVICES | Facility: CLINIC | Age: 27
End: 2017-11-17

## 2017-11-17 ENCOUNTER — TELEPHONE (OUTPATIENT)
Dept: NURSING | Facility: CLINIC | Age: 27
End: 2017-11-17

## 2017-11-17 DIAGNOSIS — O99.013 ANEMIA COMPLICATING PREGNANCY IN THIRD TRIMESTER: Primary | ICD-10-CM

## 2017-11-17 RX ORDER — DIPHENHYDRAMINE HCL 25 MG
25 TABLET ORAL
Status: CANCELLED
Start: 2017-11-20

## 2017-11-17 RX ORDER — ACETAMINOPHEN 325 MG/1
650 TABLET ORAL
Status: CANCELLED
Start: 2017-11-20

## 2017-11-17 NOTE — TELEPHONE ENCOUNTER
Informed pt.  Called infusion center to verify received. They stated they had both and would call the pt to schedule. Closing encounter.

## 2017-11-17 NOTE — TELEPHONE ENCOUNTER
An order was supposed to be put in for a IV Fusion at .  When she called them they told her that she has to contact our office again because they did not receive an order yet.    Please call back to discuss.

## 2017-11-17 NOTE — TELEPHONE ENCOUNTER
Pt has not been contacted by the infusion center and wants to schedule iron infusion. Pt given phone number [phone].

## 2017-11-20 ENCOUNTER — INFUSION THERAPY VISIT (OUTPATIENT)
Dept: INFUSION THERAPY | Facility: CLINIC | Age: 27
End: 2017-11-20
Attending: ADVANCED PRACTICE MIDWIFE
Payer: COMMERCIAL

## 2017-11-20 VITALS
HEART RATE: 86 BPM | RESPIRATION RATE: 16 BRPM | TEMPERATURE: 98.9 F | OXYGEN SATURATION: 100 % | SYSTOLIC BLOOD PRESSURE: 109 MMHG | DIASTOLIC BLOOD PRESSURE: 70 MMHG

## 2017-11-20 DIAGNOSIS — O99.013 ANEMIA COMPLICATING PREGNANCY IN THIRD TRIMESTER: Primary | ICD-10-CM

## 2017-11-20 PROCEDURE — 25000128 H RX IP 250 OP 636: Performed by: ADVANCED PRACTICE MIDWIFE

## 2017-11-20 PROCEDURE — 96365 THER/PROPH/DIAG IV INF INIT: CPT

## 2017-11-20 PROCEDURE — 25000132 ZZH RX MED GY IP 250 OP 250 PS 637: Performed by: ADVANCED PRACTICE MIDWIFE

## 2017-11-20 RX ORDER — DIPHENHYDRAMINE HCL 25 MG
25 TABLET ORAL
Status: CANCELLED
Start: 2017-11-20

## 2017-11-20 RX ORDER — ACETAMINOPHEN 325 MG/1
650 TABLET ORAL
Status: DISCONTINUED | OUTPATIENT
Start: 2017-11-20 | End: 2017-11-20 | Stop reason: HOSPADM

## 2017-11-20 RX ORDER — DIPHENHYDRAMINE HCL 25 MG
25 CAPSULE ORAL
Status: DISCONTINUED | OUTPATIENT
Start: 2017-11-20 | End: 2017-11-20 | Stop reason: HOSPADM

## 2017-11-20 RX ORDER — ACETAMINOPHEN 325 MG/1
650 TABLET ORAL
Status: CANCELLED
Start: 2017-11-20

## 2017-11-20 RX ADMIN — DIPHENHYDRAMINE HYDROCHLORIDE 25 MG: 25 CAPSULE ORAL at 08:13

## 2017-11-20 RX ADMIN — SODIUM CHLORIDE 250 ML: 9 INJECTION, SOLUTION INTRAVENOUS at 08:18

## 2017-11-20 RX ADMIN — ACETAMINOPHEN 650 MG: 325 TABLET ORAL at 08:13

## 2017-11-20 RX ADMIN — IRON SUCROSE 300 MG: 20 INJECTION, SOLUTION INTRAVENOUS at 08:19

## 2017-11-20 ASSESSMENT — PAIN SCALES - GENERAL: PAINLEVEL: NO PAIN (0)

## 2017-11-20 NOTE — MR AVS SNAPSHOT
After Visit Summary   11/20/2017    Haritha Bateman    MRN: [id number]           Patient Information     Date Of Birth          1990        Visit Information        Provider Department      11/20/2017 8:00 AM  INFUSION CHAIR 10 [facility]        Today's Diagnoses     Anemia complicating pregnancy in third trimester    -  1       Follow-ups after your visit        Your next 10 appointments already scheduled     Nov 25, 2017 11:00 AM CST   Level 1 with  INFUSION CHAIR 15   [facility] ([facility])    [facility]  [address]  [place] [address]   [phone]            Nov 27, 2017 10:00 AM CST   Level 1 with  INFUSION CHAIR 15   [facility])    [facility] [place]  [address]  [place] [address]   [phone]            Nov 28, 2017 10:30 AM CST   ESTABLISHED PRENATAL with ABIMAEL Bonds CNM   [facility] [place] ([facility] [place])    [address]  [place] [address]   [phone]              Who to contact     If you have questions or need follow up information about today's clinic visit or your schedule please contact [facility] directly at [phone].  Normal or non-critical lab and imaging results will be communicated to you by MyChart, letter or phone within 4 business days after the clinic has received the results. If you do not hear from us within 7 days, please contact the clinic through MyChart or phone. If you have a critical or abnormal lab result, we will notify you by phone as soon as possible.  Submit refill requests through [employer] or call your pharmacy and they will forward the refill request to us. Please allow 3 business days for your refill to be completed.           Additional Information About Your Visit        MyChart Information     [employer] gives you secure access to your electronic health record. If you see a primary care provider, you can also send messages to your care team and make appointments. If you have questions, please call your primary care clinic.  If you do not have a primary care provider, please call [phone] and they will assist you.        Care EveryWhere ID     This is your Care EveryWhere ID. This could be used by other organizations to access your [place] medical records  [id number]        Your Vitals Were     Pulse Temperature Respirations Last Period Pulse Oximetry       86 98.9  F (37.2  C) (Oral) 16 05/04/2017 100%        Blood Pressure from Last 3 Encounters:   11/20/17 109/70   11/14/17 112/72   10/17/17 112/74    Weight from Last 3 Encounters:   11/14/17 72.6 kg (160 lb)   10/17/17 70.3 kg (155 lb)   09/22/17 65.5 kg (144 lb 6.4 oz)              Today, you had the following     No orders found for display       Primary Care Provider    Physician No Ref-Primary       NO REF-PRIMARY PHYSICIAN        Equal Access to Services     [facility]: Hadii rupinder Hamilton, wamalikada luis, qaybta kylah he, cayetano amador . So [facility] [phone].    ATENCIÓN: Si habla español, tiene a mishra disposición servicios gratHomeStayos de asistencia lingüística. Sherin al [phone].    We comply with applicable federal civil rights laws and [place] laws. We do not discriminate on the basis of race, color, national origin, age, disability, sex, sexual orientation, or gender identity.            Thank you!     Thank you for choosing [facility] [place] [facility]  for your care. Our goal is always to provide you with excellent care. Hearing back from our patients is one way we can continue to improve our services. Please take a few minutes to complete the written survey that you may receive in the mail after  your visit with us. Thank you!             Your Updated Medication List - Protect others around you: Learn how to safely use, store and throw away your medicines at www.disposemymeds.org.          This list is accurate as of: 11/20/17  9:55 AM.  Always use your most recent med list.                   Brand Name Dispense Instructions for use Diagnosis    IRON SUPPLEMENT PO      Take by mouth daily (with breakfast)        PRENATAL PLUS 27-1 MG Tabs           VITAMIN B 12 PO      Take by mouth daily        VITAMIN C PO      Take by mouth daily

## 2017-11-20 NOTE — PROGRESS NOTES
Infusion Nursing Note:  Haritha Bateman presents today for Venofer.    Patient seen by provider today: No   present during visit today: Not Applicable.    Note: Educated patient on common side effects of venofer. Gave patient micromedex handout on venofer. All questions answered..    Intravenous Access:  Peripheral IV placed.    Treatment Conditions:  Not Applicable.    Post Infusion Assessment:  Patient tolerated infusion without incident.  Site patent and intact, free from redness, edema or discomfort.  No evidence of extravasations.  Access discontinued per protocol.    Discharge Plan:   Discharge instructions reviewed with: Patient.  Patient and/or family verbalized understanding of discharge instructions and all questions answered.  AVS to patient via [employer].  Patient will return 11/25/2017 for next appointment.   Patient discharged in stable condition accompanied by: self.  Departure Mode: Ambulatory.    Jia Hurt RN

## 2017-11-23 ENCOUNTER — OFFICE VISIT (OUTPATIENT)
Dept: URGENT CARE | Facility: URGENT CARE | Age: 27
End: 2017-11-23
Payer: COMMERCIAL

## 2017-11-23 ENCOUNTER — NURSE TRIAGE (OUTPATIENT)
Dept: NURSING | Facility: CLINIC | Age: 27
End: 2017-11-23

## 2017-11-23 VITALS
DIASTOLIC BLOOD PRESSURE: 77 MMHG | TEMPERATURE: 98.8 F | HEART RATE: 89 BPM | BODY MASS INDEX: 27.94 KG/M2 | OXYGEN SATURATION: 99 % | WEIGHT: 162.8 LBS | SYSTOLIC BLOOD PRESSURE: 122 MMHG | RESPIRATION RATE: 20 BRPM

## 2017-11-23 DIAGNOSIS — R30.0 DYSURIA: Primary | ICD-10-CM

## 2017-11-23 LAB
ALBUMIN UR-MCNC: NEGATIVE MG/DL
APPEARANCE UR: CLEAR
BILIRUB UR QL STRIP: NEGATIVE
COLOR UR AUTO: YELLOW
GLUCOSE UR STRIP-MCNC: NEGATIVE MG/DL
HGB UR QL STRIP: NEGATIVE
KETONES UR STRIP-MCNC: NEGATIVE MG/DL
LEUKOCYTE ESTERASE UR QL STRIP: NEGATIVE
MUCOUS THREADS #/AREA URNS LPF: PRESENT /LPF
NITRATE UR QL: NEGATIVE
NON-SQ EPI CELLS #/AREA URNS LPF: ABNORMAL /LPF
PH UR STRIP: 6 PH (ref 5–7)
RBC #/AREA URNS AUTO: ABNORMAL /HPF
SOURCE: ABNORMAL
SP GR UR STRIP: 1.02 (ref 1–1.03)
UROBILINOGEN UR STRIP-ACNC: 0.2 EU/DL (ref 0.2–1)
WBC #/AREA URNS AUTO: ABNORMAL /HPF

## 2017-11-23 PROCEDURE — 81001 URINALYSIS AUTO W/SCOPE: CPT | Performed by: FAMILY MEDICINE

## 2017-11-23 PROCEDURE — 99213 OFFICE O/P EST LOW 20 MIN: CPT | Performed by: FAMILY MEDICINE

## 2017-11-23 PROCEDURE — 87086 URINE CULTURE/COLONY COUNT: CPT | Performed by: FAMILY MEDICINE

## 2017-11-23 RX ORDER — NITROFURANTOIN 25; 75 MG/1; MG/1
100 CAPSULE ORAL 2 TIMES DAILY
Qty: 14 CAPSULE | Refills: 0 | Status: SHIPPED | OUTPATIENT
Start: 2017-11-23 | End: 2017-11-30

## 2017-11-23 NOTE — MR AVS SNAPSHOT
After Visit Summary   11/23/2017    Haritha Bateman    MRN: [id number]           Patient Information     Date Of Birth          1990        Visit Information        Provider Department      11/23/2017 1:15 PM Kiya Pulido MD [place] [facility]        Today's Diagnoses     Dysuria    -  1       Follow-ups after your visit        Your next 10 appointments already scheduled     Nov 25, 2017 11:00 AM CST   Level 2 with SH INFUSION CHAIR 15   [facility] ([facility])    [facility]  [address]  [place] [address]   [phone]            Nov 27, 2017 10:00 AM CST   Level 2 with SH INFUSION CHAIR 15   [facility] ([facility])    [facility] [place]  [address]  [place] [address]   [phone]            Nov 28, 2017 10:30 AM CST   ESTABLISHED PRENATAL with ABIMAEL Bonds CNM   [facility] [place] ([facility] [place])    [address]  [place] [address]   [phone]              Who to contact     If you have questions or need follow up information about today's clinic visit or your schedule please contact [place] [facility] directly at [phone].  Normal or non-critical lab and imaging results will be communicated to you by MyChart, letter or phone within 4 business days after the clinic has received the results. If you do not hear from us within 7 days, please contact the clinic through MyChart or phone. If you have a critical or abnormal lab result, we will notify you by phone as soon as possible.  Submit refill requests through [employer] or call your pharmacy and they will forward the refill request to us. Please allow 3 business days for your refill to be completed.          Additional Information About Your Visit        [facility]  Information     [employer] gives you secure access to your electronic health record. If you see a primary care provider, you can also send messages to your care team and make appointments. If you have questions, please call your primary care clinic.  If you do not have a primary care provider, please call [phone] and they will assist you.        Care EveryWhere ID     This is your Care EveryWhere ID. This could be used by other organizations to access your [place] medical records  [id number]        Your Vitals Were     Pulse Temperature Respirations Last Period Pulse Oximetry BMI (Body Mass Index)    89 98.8  F (37.1  C) (Oral) 20 05/04/2017 99% 27.94 kg/m2       Blood Pressure from Last 3 Encounters:   11/23/17 122/77   11/20/17 109/70   11/14/17 112/72    Weight from Last 3 Encounters:   11/23/17 162 lb 12.8 oz (73.8 kg)   11/14/17 160 lb (72.6 kg)   10/17/17 155 lb (70.3 kg)              We Performed the Following     UA with Microscopic reflex to Culture     Urine Culture Aerobic Bacterial          Today's Medication Changes          These changes are accurate as of: 11/23/17  2:04 PM.  If you have any questions, ask your nurse or doctor.               Start taking these medicines.        Dose/Directions    nitroFURantoin (macrocrystal-monohydrate) 100 MG capsule   Commonly known as:  MACROBID   Used for:  Dysuria   Started by:  Kiya Pulido MD        Dose:  100 mg   Take 1 capsule (100 mg) by mouth 2 times daily for 7 days   Quantity:  14 capsule   Refills:  0            Where to get your medicines      Some of these will need a paper prescription and others can be bought over the counter.  Ask your nurse if you have questions.     Bring a paper prescription for each of these medications     nitroFURantoin (macrocrystal-monohydrate) 100 MG capsule                Primary Care Provider    Physician No Ref-Primary       NO REF-PRIMARY PHYSICIAN        Equal Access to Services     ADEOLA DASH: Nieves bucio  luz Hamilton, wamalikada miroslavaadaha, qaaggieta kachristian cyndyscooter, waxdagoberto idiin haydishavlad naylorehsan sarahjenniferdimitri amador lillian. So [facility] [phone].    ATENCIÓN: Si habla español, tiene a mishra disposición servicios gratuitos de asistencia lingüística. Belkysame al [phone].    We comply with applicable federal civil rights laws and [place] laws. We do not discriminate on the basis of race, color, national origin, age, disability, sex, sexual orientation, or gender identity.            Thank you!     Thank you for choosing [place] [facility]  for your care. Our goal is always to provide you with excellent care. Hearing back from our patients is one way we can continue to improve our services. Please take a few minutes to complete the written survey that you may receive in the mail after your visit with us. Thank you!             Your Updated Medication List - Protect others around you: Learn how to safely use, store and throw away your medicines at www.disposemymeds.org.          This list is accurate as of: 11/23/17  2:04 PM.  Always use your most recent med list.                   Brand Name Dispense Instructions for use Diagnosis    IRON SUPPLEMENT PO      Take by mouth daily (with breakfast)        nitroFURantoin (macrocrystal-monohydrate) 100 MG capsule    MACROBID    14 capsule    Take 1 capsule (100 mg) by mouth 2 times daily for 7 days    Dysuria       PRENATAL PLUS 27-1 MG Tabs           VITAMIN B 12 PO      Take by mouth daily        VITAMIN C PO      Take by mouth daily

## 2017-11-23 NOTE — NURSING NOTE
"Chief Complaint   Patient presents with     Urgent Care     Pt states UTI sxs 1x days        Initial /77  Pulse 89  Temp 98.8  F (37.1  C) (Oral)  Resp 20  Wt 162 lb 12.8 oz (73.8 kg)  LMP 05/04/2017  SpO2 99%  BMI 27.94 kg/m2 Estimated body mass index is 27.94 kg/(m^2) as calculated from the following:    Height as of 9/22/17: 5' 4\" (1.626 m).    Weight as of this encounter: 162 lb 12.8 oz (73.8 kg).  Medication Reconciliation: complete    "

## 2017-11-23 NOTE — PROGRESS NOTES
SUBJECTIVE:   Haritha Bateman is a 27 year old female 29 weeks pregnant  who  presents today for a possible UTI. Symptoms of dysuria and frequency have been going on for 1day(s).  Hematuria no.  sudden onsetand mild.  There is no history of fever, chills, nausea or vomiting.  No history of vaginal discharge. This patient does not  have a history of urinary tract infections. Patient denies flank pain, temperature > 101 degrees F. and Vomiting, significant nausea or diarrhea or vaginal discharge     Past Medical History:   Diagnosis Date     NO ACTIVE PROBLEMS 09/22/2017     Current Outpatient Prescriptions   Medication Sig Dispense Refill     Ferrous Sulfate (IRON SUPPLEMENT PO) Take by mouth daily (with breakfast)       Cyanocobalamin (VITAMIN B 12 PO) Take by mouth daily       Ascorbic Acid (VITAMIN C PO) Take by mouth daily       Prenatal Vit-Fe Fumarate-FA (PRENATAL PLUS) 27-1 MG TABS        Social History   Substance Use Topics     Smoking status: Never Smoker     Smokeless tobacco: Never Used     Alcohol use No       ROS:   Review of systems negative except as stated above.    OBJECTIVE:  /77  Pulse 89  Temp 98.8  F (37.1  C) (Oral)  Resp 20  Wt 162 lb 12.8 oz (73.8 kg)  LMP 05/04/2017  SpO2 99%  BMI 27.94 kg/m2  GENERAL APPEARANCE: healthy, alert and no distress  RESP: lungs clear to auscultation - no rales, rhonchi or wheezes  CV: regular rates and rhythm, normal S1 S2, no murmur noted  ABDOMEN:  soft, nontender, no HSM or masses and bowel sounds normal  BACK: No CVA tenderness  SKIN: no suspicious lesions or rashes    ASSESSMENT:   Haritha was seen today for urgent care.    Diagnoses and all orders for this visit:    Dysuria  -     UA with Microscopic reflex to Culture  -     Urine Culture Aerobic Bacterial  -     nitroFURantoin, macrocrystal-monohydrate, (MACROBID) 100 MG capsule; Take 1 capsule (100 mg) by mouth 2 times daily for 7 days          PLAN:  As per ordered above  Drink plenty of fluids.   Prevention and treatment of UTI's discussed.Signs and symptoms of pyelonephritis mentioned.  Follow up with primary care physician if not improving  Follow up if  symptoms fail to improve or worsens   Pt understood and agreed with plan

## 2017-11-23 NOTE — TELEPHONE ENCOUNTER
"Patient calling stating \"I think I have an urinary tract infection.\"   Symptoms starting 11/22/17. Dysuria, frequency. Afebrile. Fetal movement is positive.  Patient agrees to go to [facility] now.     Reason for Disposition    [1] Painful urination in pregnancy AND [2] no current antibiotic treatment    Additional Information    Negative: Shock suspected (e.g., cold/pale/clammy skin, too weak to stand, low BP, rapid pulse)    Negative: Sounds like a life-threatening emergency to the triager    Negative: Followed a genital area injury    Negative: Followed a genital area injury (penis, scrotum)    Negative: Vaginal discharge    Negative: Pus (white, yellow) or bloody discharge from end of penis    Negative: [1] Taking antibiotic for urinary tract infection (UTI) AND [2] female    Negative: [1] Taking antibiotic for urinary tract infection (UTI) AND [2] male    [1] Discomfort (pain, burning or stinging) when passing urine AND [2] pregnant    Negative: Shock suspected (e.g., cold/pale/clammy skin, too weak to stand, low BP, rapid pulse)    Negative: Sounds like a life-threatening emergency to the triager    Negative: Followed a genital area injury    Negative: [1] Unable to urinate (or only a few drops) > 4 hours AND     [2] bladder feels very full (e.g., palpable bladder or strong urge to urinate)    Negative: [1] Pregnant 23 or more weeks AND [2] baby is moving less today (e.g., kick count < 5 in 1 hour or < 10 in 2 hours)    Negative: SEVERE pain with urination    Negative: Side (flank) or lower back pain present    Negative: Fever > 100.4 F (38.0 C)    Negative: Patient sounds very sick or weak to the triager    Negative: Blood in urine (red, pink, or tea-colored)    Negative: Diabetes mellitus or weak immune system (e.g., HIV positive, cancer chemotherapy, transplant patient)    Negative: Bedridden (e.g., chronic illness, recovering from surgery)    Negative: [1] Taking antibiotic > 24 hours for UTI " AND [2] fever persists    Negative: [1] Taking antibiotic > 72 hours (3 days) for UTI AND [2] painful urination not improved    Negative: Unusual vaginal discharge (e.g., bad smelling, yellow, green, or foamy-white)    Protocols used: PREGNANCY - URINATION PAIN-ADULT-, URINARY SYMPTOMS-ADULT-AH

## 2017-11-25 ENCOUNTER — INFUSION THERAPY VISIT (OUTPATIENT)
Dept: INFUSION THERAPY | Facility: CLINIC | Age: 27
End: 2017-11-25
Attending: ADVANCED PRACTICE MIDWIFE
Payer: COMMERCIAL

## 2017-11-25 VITALS
TEMPERATURE: 97.9 F | RESPIRATION RATE: 16 BRPM | DIASTOLIC BLOOD PRESSURE: 71 MMHG | SYSTOLIC BLOOD PRESSURE: 106 MMHG | HEART RATE: 81 BPM

## 2017-11-25 DIAGNOSIS — O99.013 ANEMIA COMPLICATING PREGNANCY IN THIRD TRIMESTER: Primary | ICD-10-CM

## 2017-11-25 PROCEDURE — 25000132 ZZH RX MED GY IP 250 OP 250 PS 637: Performed by: ADVANCED PRACTICE MIDWIFE

## 2017-11-25 PROCEDURE — 96365 THER/PROPH/DIAG IV INF INIT: CPT

## 2017-11-25 PROCEDURE — 96366 THER/PROPH/DIAG IV INF ADDON: CPT

## 2017-11-25 PROCEDURE — 25000128 H RX IP 250 OP 636: Performed by: ADVANCED PRACTICE MIDWIFE

## 2017-11-25 RX ORDER — DIPHENHYDRAMINE HCL 25 MG
25 TABLET ORAL
Status: DISCONTINUED | OUTPATIENT
Start: 2017-11-25 | End: 2017-11-25 | Stop reason: HOSPADM

## 2017-11-25 RX ORDER — ACETAMINOPHEN 325 MG/1
650 TABLET ORAL
Status: DISCONTINUED | OUTPATIENT
Start: 2017-11-25 | End: 2017-11-25 | Stop reason: HOSPADM

## 2017-11-25 RX ORDER — ACETAMINOPHEN 325 MG/1
650 TABLET ORAL
Status: CANCELLED
Start: 2017-11-25

## 2017-11-25 RX ORDER — DIPHENHYDRAMINE HCL 25 MG
25 TABLET ORAL
Status: CANCELLED
Start: 2017-11-25

## 2017-11-25 RX ADMIN — IRON SUCROSE 300 MG: 20 INJECTION, SOLUTION INTRAVENOUS at 11:22

## 2017-11-25 RX ADMIN — DIPHENHYDRAMINE HYDROCHLORIDE 25 MG: 25 CAPSULE ORAL at 11:07

## 2017-11-25 RX ADMIN — ACETAMINOPHEN 650 MG: 325 TABLET ORAL at 11:07

## 2017-11-25 ASSESSMENT — PAIN SCALES - GENERAL: PAINLEVEL: NO PAIN (0)

## 2017-11-25 NOTE — MR AVS SNAPSHOT
After Visit Summary   11/25/2017    Haritha Bateman    MRN: [id number]           Patient Information     Date Of Birth          1990        Visit Information        Provider Department      11/25/2017 11:00 AM  INFUSION CHAIR 15 [facility]        Today's Diagnoses     Anemia complicating pregnancy in third trimester    -  1       Follow-ups after your visit        Your next 10 appointments already scheduled     Nov 27, 2017 10:00 AM CST   Level 2 with  INFUSION CHAIR 15   [facility] ([facility])    [facility]  [address]  [place] [id number] [phone]            Nov 28, 2017 10:30 AM CST   ESTABLISHED PRENATAL with ABIMAEL Bonds CNM   [facility] [place] ([facility] [place])    [address]  [place] [id number] [phone]              Who to contact     If you have questions or need follow up information about today's clinic visit or your schedule please contact [facility] AND [facility] directly at [phone].  Normal or non-critical lab and imaging results will be communicated to you by Endecahart, letter or phone within 4 business days after the clinic has received the results. If you do not hear from us within 7 days, please contact the clinic through Endecahart or phone. If you have a critical or abnormal lab result, we will notify you by phone as soon as possible.  Submit refill requests through [employer] or call your pharmacy and they will forward the refill request to us. Please allow 3 business days for your refill to be completed.          Additional Information About Your Visit        MyChart Information     [employer] gives you secure access to your electronic health record. If you see a primary care provider, you can also send messages to your care team and make appointments. If you have  questions, please call your primary care clinic.  If you do not have a primary care provider, please call [phone] and they will assist you.        Care EveryWhere ID     This is your Care EveryWhere ID. This could be used by other organizations to access your [place] medical records  [id number]        Your Vitals Were     Pulse Temperature Respirations Last Period          81 97.9  F (36.6  C) (Oral) 16 05/04/2017         Blood Pressure from Last 3 Encounters:   11/25/17 106/71   11/23/17 122/77   11/20/17 109/70    Weight from Last 3 Encounters:   11/23/17 73.8 kg (162 lb 12.8 oz)   11/14/17 72.6 kg (160 lb)   10/17/17 70.3 kg (155 lb)              Today, you had the following     No orders found for display       Primary Care Provider    Physician No Ref-Primary       NO REF-PRIMARY PHYSICIAN        Equal Access to Services     [facility]: Hadii rupinder [place], waaxda luis, qaybta kaalmada neri, cayetano amador . So [facility] [phone].    ATENCIÓN: Si habla español, tiene a mishra disposición servicios gratuitos de asistencia lingüística. Llame al [phone].    We comply with applicable federal civil rights laws and [place] laws. We do not discriminate on the basis of race, color, national origin, age, disability, sex, sexual orientation, or gender identity.            Thank you!     Thank you for choosing [facility] AND [facility]  for your care. Our goal is always to provide you with excellent care. Hearing back from our patients is one way we can continue to improve our services. Please take a few minutes to complete the written survey that you may receive in the mail after your visit with us. Thank you!             Your Updated Medication List - Protect others around you: Learn how to safely use, store and throw away your medicines at www.disposemymeds.org.          This list is accurate as of: 11/25/17  1:01 PM.  Always use your most  recent med list.                   Brand Name Dispense Instructions for use Diagnosis    IRON SUPPLEMENT PO      Take by mouth daily (with breakfast)        nitroFURantoin (macrocrystal-monohydrate) 100 MG capsule    MACROBID    14 capsule    Take 1 capsule (100 mg) by mouth 2 times daily for 7 days    Dysuria       PRENATAL PLUS 27-1 MG Tabs           VITAMIN B 12 PO      Take by mouth daily        VITAMIN C PO      Take by mouth daily

## 2017-11-25 NOTE — PROGRESS NOTES
Infusion Nursing Note:  Emilylanishi Bateman presents today for Venofer.    Patient seen by provider today: No   present during visit today: Not Applicable.    Note: N/A.    Intravenous Access:  Peripheral IV placed.    Treatment Conditions:  Not Applicable.    Post Infusion Assessment:  Patient tolerated infusion without incident.  Site patent and intact, free from redness, edema or discomfort.  No evidence of extravasations.  Access discontinued per protocol.    Discharge Plan:   Discharge instructions reviewed with: Patient.  Patient and/or family verbalized understanding of discharge instructions and all questions answered.  AVS to patient via [employer].  Patient will return 11/27/2017 for next appointment.   Patient discharged in stable condition accompanied by: self.  Departure Mode: Ambulatory.    Jia Hurt RN

## 2017-11-26 LAB
BACTERIA SPEC CULT: NORMAL
SPECIMEN SOURCE: NORMAL

## 2017-11-27 ENCOUNTER — INFUSION THERAPY VISIT (OUTPATIENT)
Dept: INFUSION THERAPY | Facility: CLINIC | Age: 27
End: 2017-11-27
Attending: ADVANCED PRACTICE MIDWIFE
Payer: COMMERCIAL

## 2017-11-27 VITALS
RESPIRATION RATE: 16 BRPM | DIASTOLIC BLOOD PRESSURE: 74 MMHG | TEMPERATURE: 98.7 F | HEART RATE: 96 BPM | SYSTOLIC BLOOD PRESSURE: 115 MMHG

## 2017-11-27 DIAGNOSIS — O99.013 ANEMIA COMPLICATING PREGNANCY IN THIRD TRIMESTER: Primary | ICD-10-CM

## 2017-11-27 PROCEDURE — 25000132 ZZH RX MED GY IP 250 OP 250 PS 637: Performed by: ADVANCED PRACTICE MIDWIFE

## 2017-11-27 PROCEDURE — 96365 THER/PROPH/DIAG IV INF INIT: CPT

## 2017-11-27 PROCEDURE — 25000128 H RX IP 250 OP 636: Performed by: ADVANCED PRACTICE MIDWIFE

## 2017-11-27 RX ORDER — ACETAMINOPHEN 325 MG/1
650 TABLET ORAL
Status: CANCELLED
Start: 2017-11-27

## 2017-11-27 RX ORDER — DIPHENHYDRAMINE HCL 25 MG
25 TABLET ORAL
Status: CANCELLED
Start: 2017-11-27

## 2017-11-27 RX ORDER — ACETAMINOPHEN 325 MG/1
650 TABLET ORAL
Status: DISCONTINUED | OUTPATIENT
Start: 2017-11-27 | End: 2017-11-27 | Stop reason: HOSPADM

## 2017-11-27 RX ORDER — DIPHENHYDRAMINE HCL 25 MG
25 CAPSULE ORAL
Status: DISCONTINUED | OUTPATIENT
Start: 2017-11-27 | End: 2017-11-27 | Stop reason: HOSPADM

## 2017-11-27 RX ADMIN — DIPHENHYDRAMINE HYDROCHLORIDE 25 MG: 25 CAPSULE ORAL at 10:10

## 2017-11-27 RX ADMIN — IRON SUCROSE 300 MG: 20 INJECTION, SOLUTION INTRAVENOUS at 10:12

## 2017-11-27 RX ADMIN — ACETAMINOPHEN 650 MG: 325 TABLET ORAL at 10:09

## 2017-11-27 RX ADMIN — SODIUM CHLORIDE 250 ML: 9 INJECTION, SOLUTION INTRAVENOUS at 10:10

## 2017-11-27 NOTE — PROGRESS NOTES
Infusion Nursing Note:  Haritha Bateman presents today for venofer.    Patient seen by provider today: No   present during visit today: Not Applicable.    Note: N/A.    Intravenous Access:  Peripheral IV placed.    Treatment Conditions:  Not Applicable.      Post Infusion Assessment:  Patient tolerated infusion without incident.  Access discontinued per protocol.    Discharge Plan:   Patient discharged in stable condition accompanied by: self.  Departure Mode: Ambulatory.    Iris Gaspar RN

## 2017-11-27 NOTE — MR AVS SNAPSHOT
After Visit Summary   11/27/2017    Haritha Bateman    MRN: [id number]           Patient Information     Date Of Birth          1990        Visit Information        Provider Department      11/27/2017 10:00 AM  INFUSION CHAIR 15 [facility]        Today's Diagnoses     Anemia complicating pregnancy in third trimester    -  1       Follow-ups after your visit        Your next 10 appointments already scheduled     Nov 28, 2017 10:30 AM CST   ESTABLISHED PRENATAL with ABIMAEL Bonds CNM   [facility] [place] ([facility] [place])    [address] [id number] [phone]              Who to contact     If you have questions or need follow up information about today's clinic visit or your schedule please contact [facility] AND [facility] directly at [phone].  Normal or non-critical lab and imaging results will be communicated to you by MyChart, letter or phone within 4 business days after the clinic has received the results. If you do not hear from us within 7 days, please contact the clinic through RedSeg[employer] or phone. If you have a critical or abnormal lab result, we will notify you by phone as soon as possible.  Submit refill requests through [employer] or call your pharmacy and they will forward the refill request to us. Please allow 3 business days for your refill to be completed.          Additional Information About Your Visit        MyChart Information     [employer] gives you secure access to your electronic health record. If you see a primary care provider, you can also send messages to your care team and make appointments. If you have questions, please call your primary care clinic.  If you do not have a primary care provider, please call [phone] and they will assist you.        Care EveryWhere ID     This is your Care EveryWhere ID. This could be used by other  organizations to access your [place] medical records  [id number]        Your Vitals Were     Pulse Temperature Respirations Last Period          96 98.7  F (37.1  C) 16 05/04/2017         Blood Pressure from Last 3 Encounters:   11/27/17 115/74   11/25/17 106/71   11/23/17 122/77    Weight from Last 3 Encounters:   11/23/17 73.8 kg (162 lb 12.8 oz)   11/14/17 72.6 kg (160 lb)   10/17/17 70.3 kg (155 lb)              Today, you had the following     No orders found for display       Primary Care Provider Fax #    Physician No Ref-Primary [phone]       No address on file        Equal Access to Services     [facility]RAHIM : Nieves [place], falguni smith, deb he, cayetano amador . So [facility] [phone].    ATENCIÓN: Si habla español, tiene a mishra disposición servicios gratuitos de asistencia lingüística. Llame al [phone].    We comply with applicable federal civil rights laws and [place] laws. We do not discriminate on the basis of race, color, national origin, age, disability, sex, sexual orientation, or gender identity.            Thank you!     Thank you for choosing [facility] AND [facility]  for your care. Our goal is always to provide you with excellent care. Hearing back from our patients is one way we can continue to improve our services. Please take a few minutes to complete the written survey that you may receive in the mail after your visit with us. Thank you!             Your Updated Medication List - Protect others around you: Learn how to safely use, store and throw away your medicines at www.disposemymeds.org.          This list is accurate as of: 11/27/17 12:03 PM.  Always use your most recent med list.                   Brand Name Dispense Instructions for use Diagnosis    IRON SUPPLEMENT PO      Take by mouth daily (with breakfast)        nitroFURantoin (macrocrystal-monohydrate) 100 MG capsule    MACROBID    14  capsule    Take 1 capsule (100 mg) by mouth 2 times daily for 7 days    Dysuria       PRENATAL PLUS 27-1 MG Tabs           VITAMIN B 12 PO      Take by mouth daily        VITAMIN C PO      Take by mouth daily

## 2017-11-28 ENCOUNTER — PRENATAL OFFICE VISIT (OUTPATIENT)
Dept: MIDWIFE SERVICES | Facility: CLINIC | Age: 27
End: 2017-11-28
Payer: COMMERCIAL

## 2017-11-28 VITALS — SYSTOLIC BLOOD PRESSURE: 110 MMHG | BODY MASS INDEX: 28.32 KG/M2 | DIASTOLIC BLOOD PRESSURE: 68 MMHG | WEIGHT: 165 LBS

## 2017-11-28 DIAGNOSIS — Z34.03 ENCOUNTER FOR SUPERVISION OF NORMAL FIRST PREGNANCY IN THIRD TRIMESTER: Primary | ICD-10-CM

## 2017-11-28 DIAGNOSIS — O99.013 ANEMIA COMPLICATING PREGNANCY IN THIRD TRIMESTER: ICD-10-CM

## 2017-11-28 PROBLEM — Z23 NEED FOR TDAP VACCINATION: Status: RESOLVED | Noted: 2017-10-17 | Resolved: 2017-11-28

## 2017-11-28 LAB
ERYTHROCYTE [DISTWIDTH] IN BLOOD BY AUTOMATED COUNT: 18 % (ref 10–15)
HCT VFR BLD AUTO: 31.3 % (ref 35–47)
HGB BLD-MCNC: 10.2 G/DL (ref 11.7–15.7)
MCH RBC QN AUTO: 28.8 PG (ref 26.5–33)
MCHC RBC AUTO-ENTMCNC: 32.6 G/DL (ref 31.5–36.5)
MCV RBC AUTO: 88 FL (ref 78–100)
PLATELET # BLD AUTO: 214 10E9/L (ref 150–450)
RBC # BLD AUTO: 3.54 10E12/L (ref 3.8–5.2)
WBC # BLD AUTO: 10.5 10E9/L (ref 4–11)

## 2017-11-28 PROCEDURE — 36415 COLL VENOUS BLD VENIPUNCTURE: CPT | Performed by: ADVANCED PRACTICE MIDWIFE

## 2017-11-28 PROCEDURE — 85027 COMPLETE CBC AUTOMATED: CPT | Performed by: ADVANCED PRACTICE MIDWIFE

## 2017-11-28 PROCEDURE — 99207 ZZC PRENATAL VISIT: CPT | Performed by: ADVANCED PRACTICE MIDWIFE

## 2017-11-28 NOTE — PATIENT INSTRUCTIONS
Fetal Kick Counts    It is important to know when your baby's movements occur. We often get busy with work and life and do not pay close attention to their movements.        Women typically begin feeling movement between 18-22 weeks of gestation, sometimes it can be earlier or later depending on where your placenta is       Movements usually begin feeling like popping or fluttering and as the baby grows they become more pronounced    Toward the end of pregnancy as the baby gets larger they may not move as much or make as big of movements. Babies have maturing sleep cycles as well as not as much room to move and flip. If you are ever concerned about your baby's movements or have not felt the baby move for a while, we recommend you do a fetal kick count. Prior to starting your count drink a glass of water or juice and eat a snack. Then lay down on your side and begin to count movements.     How to do a Fetal Kick Counts    There are many different ways to monitor your baby's movements. Movements can range from large jabs to small kicks, or wiggles.  Hiccups count!      Count 10 movements in 2 hours when resting and focusing    Count 10 movements in 12 hours when doing normal activity    We recommend that if movements occur but seem decreased that you should be seen in the clinic or hospital for evaluation within 12 hours. If fetal movement is absent or fetal kick counts are low please contact us right away.    If you ever have any concerns about your baby's movements DO NOT HESITATE to call us, we are here for you!    [facility]  [phone]

## 2017-11-28 NOTE — PROGRESS NOTES
"Feels well, here alone today. Has a bit more energy since her iron infusions. Noticing some \"hot flashes\" randomly throughout day and night, just feels warm, no other issues.  Fetal Movement: positive, denies loss of fluid/vb, no contractions  Fetal kick counts reviewed and handout given  Reviewed PTL precautions and s/sx of preeclampsia; denies any S&S and aware of what to report  Baby Need Boxes handout given-Yes  Recheck Hgb today    Return to clinic in 2 weeks    SEMAJ Perez  [facility] TREVINO, Jose De Guzman, am serving as a scribe; to document services personally performed by Romi VARGHESE CNM- based on data collection and the provider's statements to me.    Provider Disclosure:  I agree with above History, Review of Systems, Physical exam and Plan. I have reviewed the content of the documentation and have edited it as needed. I have personally performed the services documented here and the documentation accurately represents those services and the decisions I have made.    ABIMAEL Yusuf, AMY                  "

## 2017-11-28 NOTE — MR AVS SNAPSHOT
After Visit Summary   11/28/2017    Haritha Bateman    MRN: [id number]           Patient Information     Date Of Birth          1990        Visit Information        Provider Department      11/28/2017 10:30 AM Romi Goldsmith APRN CNM [facility]        Today's Diagnoses     Encounter for supervision of normal first pregnancy in third trimester    -  1    Anemia complicating pregnancy in third trimester          Care Instructions    Fetal Kick Counts    It is important to know when your baby's movements occur. We often get busy with work and life and do not pay close attention to their movements.        Women typically begin feeling movement between 18-22 weeks of gestation, sometimes it can be earlier or later depending on where your placenta is       Movements usually begin feeling like popping or fluttering and as the baby grows they become more pronounced    Toward the end of pregnancy as the baby gets larger they may not move as much or make as big of movements. Babies have maturing sleep cycles as well as not as much room to move and flip. If you are ever concerned about your baby's movements or have not felt the baby move for a while, we recommend you do a fetal kick count. Prior to starting your count drink a glass of water or juice and eat a snack. Then lay down on your side and begin to count movements.     How to do a Fetal Kick Counts    There are many different ways to monitor your baby's movements. Movements can range from large jabs to small kicks, or wiggles.  Hiccups count!      Count 10 movements in 2 hours when resting and focusing    Count 10 movements in 12 hours when doing normal activity    We recommend that if movements occur but seem decreased that you should be seen in the clinic or hospital for evaluation within 12 hours. If fetal movement is absent or fetal kick counts are low please contact us right away.    If you ever have any concerns about  your baby's movements DO NOT HESITATE to call us, we are here for you!    [facility]  [phone]              Follow-ups after your visit        Follow-up notes from your care team     Return in about 2 weeks (around 12/12/2017) for Prenatal Visit.      Your next 10 appointments already scheduled     Dec 12, 2017  1:30 PM CST   ESTABLISHED PRENATAL with ABIMAEL Mckeon CNM   [facility] [place] ([facility] [place])    [address] [place] [id number] [phone]              Who to contact     If you have questions or need follow up information about today's clinic visit or your schedule please contact [facility] [place] directly at [phone].  Normal or non-critical lab and imaging results will be communicated to you by MyChart, letter or phone within 4 business days after the clinic has received the results. If you do not hear from us within 7 days, please contact the clinic through MyChart or phone. If you have a critical or abnormal lab result, we will notify you by phone as soon as possible.  Submit refill requests through [employer] or call your pharmacy and they will forward the refill request to us. Please allow 3 business days for your refill to be completed.          Additional Information About Your Visit        Predily[employer]hart Information     [employer] gives you secure access to your electronic health record. If you see a primary care provider, you can also send messages to your care team and make appointments. If you have questions, please call your primary care clinic.  If you do not have a primary care provider, please call [phone] and they will assist you.        Care EveryWhere ID     This is your Care EveryWhere ID. This could be used by other organizations to access your [place] medical records  [id number]        Your Vitals Were     Last Period BMI (Body Mass Index)                05/04/2017 28.32 kg/m2            Blood Pressure from Last 3 Encounters:   11/28/17 110/68   11/27/17 115/74   11/25/17 106/71    Weight from Last 3 Encounters:   11/28/17 165 lb (74.8 kg)   11/23/17 162 lb 12.8 oz (73.8 kg)   11/14/17 160 lb (72.6 kg)              We Performed the Following     CBC with platelets        Primary Care Provider Fax #    Physician No Ref-Primary [phone]       No address on file        Equal Access to Services     ADEOLA IBRAHIM : Hadii aad ku hadasho Soomaali, waaxda luqadaha, qaybta kaalmada adeegyada, waxdagoberto amador . So [facility] [phone].    ATENCIÓN: Si habla español, tiene a mishra disposición servicios gratuitos de asistencia lingüística. Llame al [phone].    We comply with applicable federal civil rights laws and [place] laws. We do not discriminate on the basis of race, color, national origin, age, disability, sex, sexual orientation, or gender identity.            Thank you!     Thank you for choosing [facility] [place]  for your care. Our goal is always to provide you with excellent care. Hearing back from our patients is one way we can continue to improve our services. Please take a few minutes to complete the written survey that you may receive in the mail after your visit with us. Thank you!             Your Updated Medication List - Protect others around you: Learn how to safely use, store and throw away your medicines at www.disposemymeds.org.          This list is accurate as of: 11/28/17 11:19 AM.  Always use your most recent med list.                   Brand Name Dispense Instructions for use Diagnosis    IRON SUPPLEMENT PO      Take by mouth daily (with breakfast)        nitroFURantoin (macrocrystal-monohydrate) 100 MG capsule    MACROBID    14 capsule    Take 1 capsule (100 mg) by mouth 2 times daily for 7 days    Dysuria       PRENATAL PLUS 27-1 MG Tabs           VITAMIN B 12 PO      Take by mouth daily        VITAMIN C PO      Take by mouth daily

## 2017-12-01 ENCOUNTER — TELEPHONE (OUTPATIENT)
Dept: NURSING | Facility: CLINIC | Age: 27
End: 2017-12-01

## 2017-12-01 DIAGNOSIS — O99.013 ANEMIA COMPLICATING PREGNANCY IN THIRD TRIMESTER: ICD-10-CM

## 2017-12-01 DIAGNOSIS — O99.013 ANEMIA AFFECTING PREGNANCY IN THIRD TRIMESTER: Primary | ICD-10-CM

## 2017-12-01 NOTE — TELEPHONE ENCOUNTER
Called Haritha with her hgb results. Discussed that her hgb will still increase in the upcoming weeks. She states her symptoms have been resolving. Will recheck her hgb on the 12/12 appointment - labs ordered. Discussed warning s/sx and when to call.    Yandy Presley, DNP, APRN, CNM

## 2017-12-01 NOTE — TELEPHONE ENCOUNTER
Pt was told she would be called with her results from 11/28/17. Pt states she had to have an iron infusion last month and wants to know how her labs are looking this month. Routing to [facility] to review and advise.

## 2017-12-12 ENCOUNTER — PRENATAL OFFICE VISIT (OUTPATIENT)
Dept: MIDWIFE SERVICES | Facility: CLINIC | Age: 27
End: 2017-12-12
Payer: COMMERCIAL

## 2017-12-12 VITALS — DIASTOLIC BLOOD PRESSURE: 70 MMHG | BODY MASS INDEX: 28.8 KG/M2 | WEIGHT: 167.8 LBS | SYSTOLIC BLOOD PRESSURE: 114 MMHG

## 2017-12-12 DIAGNOSIS — O09.93 SUPERVISION OF HIGH RISK PREGNANCY IN THIRD TRIMESTER: Primary | ICD-10-CM

## 2017-12-12 DIAGNOSIS — M54.50 LOW BACK PAIN DURING PREGNANCY IN THIRD TRIMESTER: ICD-10-CM

## 2017-12-12 DIAGNOSIS — O99.013 ANEMIA AFFECTING PREGNANCY IN THIRD TRIMESTER: ICD-10-CM

## 2017-12-12 DIAGNOSIS — Z3A.31 31 WEEKS GESTATION OF PREGNANCY: ICD-10-CM

## 2017-12-12 DIAGNOSIS — O26.893 LOW BACK PAIN DURING PREGNANCY IN THIRD TRIMESTER: ICD-10-CM

## 2017-12-12 LAB — HGB BLD-MCNC: 11.4 G/DL (ref 11.7–15.7)

## 2017-12-12 PROCEDURE — 99207 ZZC PRENATAL VISIT: CPT | Performed by: ADVANCED PRACTICE MIDWIFE

## 2017-12-12 PROCEDURE — 36415 COLL VENOUS BLD VENIPUNCTURE: CPT | Performed by: ADVANCED PRACTICE MIDWIFE

## 2017-12-12 PROCEDURE — 85018 HEMOGLOBIN: CPT | Performed by: ADVANCED PRACTICE MIDWIFE

## 2017-12-12 NOTE — PROGRESS NOTES
I left a voicemail for Pamelanishi communicating these results.  Advised Haritha to continue her oral iron and iron-rich foods; we do not need any further iron infusions at this time.  Advised Emilymonika to call the clinic with any questions or concerns.      Juana Hollis, DNP, APRN, CNM

## 2017-12-12 NOTE — PROGRESS NOTES
Feels well, here alone today.  Has been having low back/sacral pain for the past two days. Discussed comfort measures and gave Rx for support belt.    Fetal Movement: positive, denies loss of fluid/vb, no contractions.  Had small amount of brown discharge when she wiped x1, no red/pink blood, nothing since.  Discussed when to call.  Fetal kick counts/movement reviewed  Reviewed PTL precautions and s/sx of preeclampsia; denies any S&S and aware of what to report      Peds chosen: No, provided pamphlets  Pediatrician: Hep B, Vit K, Erythromycin, gave handouts    Plans to breastfeed: Yes  Breastfeeding class planned: No, recommended     Had iron infusions x3, continues on oral iron. Hgb drawn today. Baby box given.    Return to clinic in 2 weeks    Juana Hollis DNP, APRN, CNM

## 2017-12-12 NOTE — MR AVS SNAPSHOT
"              After Visit Summary   2017    Haritha Bateman    MRN: [id number]           Patient Information     Date Of Birth          1990        Visit Information        Provider Department      2017 1:30 PM Juana Hollis APRN CNM [facility]        Today's Diagnoses     Supervision of high risk pregnancy in third trimester    -  1    31 weeks gestation of pregnancy        Low back pain during pregnancy in third trimester        Anemia affecting pregnancy in third trimester          Care Instructions    PREECLAMPSIA SIGNS AND SYMPTOMS    Preeclampsia is a dangerous condition that some women develop in the second half of pregnancy. It can also begin after the baby is born.  Preeclampsia causes high blood pressure and can cause problems with many organ systems in your body.  It can also affect the growth of your baby. The exact cause of preeclampsia is unknown, however, there are signs and symptoms to watch for:    -A bad headache that doesn't improve with Tylenol  -Visual changes such as spots, flashes of light, blurry vision  -Pain in the upper right part of your abdomen, especially under the ribs  -Nausea and/or vomiting  -Feeling extremely tired  -Yellowing of the skin and/or eyes  -Feeling \"not quite right\" or that something is wrong  -An extreme amount of swelling (some swelling in pregnancy is very normal)    If your midwife feels that you are developing preeclampsia, you will have lab tests drawn and will be monitored very closely.     If you are experiencing these symptoms, please call the [facility] immediately at [phone].      SIGNS OF  LABOR    Labor is  if it happens more than three weeks before your due date.    It can be hard to know if you are in labor, since the symptoms can be like the normal feelings of pregnancy.  Often, the only difference is the symptoms increase or they don't go away.     Signs of  labor can " include:      Contractions which can feel like period cramps or gas pain.  You may feel it in the lower part of your abdomen, in your back, or as a pressure feeling in your bottom.  It is often regular, coming every 5 or 10 minutes, and  lasting about 30-60 seconds. Some contractions are normal during pregnancy (One[place] rico contractions) but if you are feeling more than 5-6 in one hour that is NOT normal    If this occurs empty your bladder, then drink 2-3 glasses of water, eat a snack, and lay down on your left side. Put your hand on your abdomen to count the contractions.  If after one hour of resting you have still had 5-6 contractions call your clinic right away.      If you feel a pop, gush, or trickle of fluid it may mean that your bag of water has broken and you should contact the clinic       You may also experience loose stools and/or rectal pressure       Listen to your body, if something doesn't seem right please call us at the clinic    Risk Factors      Previous  delivery    Bacterial Vaginosis- if you notice a fishy smell to your discharge or experience vaginal itching/discomfort you should be evaluated for infection    Smoking    Drug abuse    Adolescent (teen) pregnancy or advanced maternal age (AMA) age 35 and over    Dehydration (this may not cause  labor but it can cause contractions)    If you think you are in  labor we may do some lab testing in the clinic or send you to the hospital for evaluation    Please call us if you are concerned you are in  labor.    [facility]  [phone]                Follow-ups after your visit        Follow-up notes from your care team     Return in about 2 weeks (around 2017).      Your next 10 appointments already scheduled     Dec 12, 2017  1:30 PM CST   ESTABLISHED PRENATAL with ABIMAEL Mckeon CNM   [facility] ([facility])    [address]  [place] [address] [phone]            Dec 26, 2017  2:30 PM CST   ESTABLISHED PRENATAL with ABIMAEL Bonds CNM   [facility] [place] ([facility] [place])    [address] [place] [address]  [address]  [place] [id number] [phone]              Who to contact     If you have questions or need follow up information about today's clinic visit or your schedule please contact [facility] [place] directly at [phone].  Normal or non-critical lab and imaging results will be communicated to you by Ipropertyzhart, letter or phone within 4 business days after the clinic has received the results. If you do not hear from us within 7 days, please contact the clinic through [email or web address] or phone. If you have a critical or abnormal lab result, we will notify you by phone as soon as possible.  Submit refill requests through WatrHub or call your pharmacy and they will forward the refill request to us. Please allow 3 business days for your refill to be completed.          Additional Information About Your Visit        Ipropertyzhart Information     WatrHub gives you secure access to your electronic health record. If you see a primary care provider, you can also send messages to your care team and make appointments. If you have questions, please call your primary care clinic.  If you do not have a primary care provider, please call [phone] and they will assist you.        Care EveryWhere ID     This is your Care EveryWhere ID. This could be used by other organizations to access your [facility] medical records  [id number]        Your Vitals Were     Last Period BMI (Body Mass Index)                05/04/2017 28.8 kg/m2           Blood Pressure from Last 3 Encounters:   12/12/17 114/70   11/28/17 110/68   11/27/17 115/74    Weight from Last 3 Encounters:   12/12/17 167 lb 12.8 oz (76.1 kg)   11/28/17 165 lb (74.8 kg)   11/23/17 162 lb 12.8 oz (73.8 kg)              We Performed the  Following     Hemoglobin          Today's Medication Changes          These changes are accurate as of: 12/12/17  1:11 PM.  If you have any questions, ask your nurse or doctor.               Start taking these medicines.        Dose/Directions    order for DME   Used for:  Low back pain during pregnancy in third trimester   Started by:  Juana Hollis APRN CNM        Equipment being ordered: One pregnancy support belt for low back pain in pregnancy.   Quantity:  1 Device   Refills:  0            Where to get your medicines      Some of these will need a paper prescription and others can be bought over the counter.  Ask your nurse if you have questions.     Bring a paper prescription for each of these medications     order for DME                Primary Care Provider Fax #    Physician No Ref-Primary [phone]       No address on file        Equal Access to Services     ADEOLA IBRAHIM : Nieves Hamilton, falguni smith, deb kaalmamelchor he, cayetano snyder. So [facility] [phone].    ATENCIÓN: Si habla español, tiene a mishra disposición servicios gratuitos de asistencia lingüística. Llame al [phone].    We comply with applicable federal civil rights laws and [place] laws. We do not discriminate on the basis of race, color, national origin, age, disability, sex, sexual orientation, or gender identity.            Thank you!     Thank you for choosing [facility]  for your care. Our goal is always to provide you with excellent care. Hearing back from our patients is one way we can continue to improve our services. Please take a few minutes to complete the written survey that you may receive in the mail after your visit with us. Thank you!             Your Updated Medication List - Protect others around you: Learn how to safely use, store and throw away your medicines at www.disposemymeds.org.          This list is accurate as of: 12/12/17  1:11  PM.  Always use your most recent med list.                   Brand Name Dispense Instructions for use Diagnosis    IRON SUPPLEMENT PO      Take by mouth daily (with breakfast)        order for DME     1 Device    Equipment being ordered: One pregnancy support belt for low back pain in pregnancy.    Low back pain during pregnancy in third trimester       PRENATAL PLUS 27-1 MG Tabs           VITAMIN B 12 PO      Take by mouth daily        VITAMIN C PO      Take by mouth daily

## 2017-12-12 NOTE — PATIENT INSTRUCTIONS
"PREECLAMPSIA SIGNS AND SYMPTOMS    Preeclampsia is a dangerous condition that some women develop in the second half of pregnancy. It can also begin after the baby is born.  Preeclampsia causes high blood pressure and can cause problems with many organ systems in your body.  It can also affect the growth of your baby. The exact cause of preeclampsia is unknown, however, there are signs and symptoms to watch for:    -A bad headache that doesn't improve with Tylenol  -Visual changes such as spots, flashes of light, blurry vision  -Pain in the upper right part of your abdomen, especially under the ribs  -Nausea and/or vomiting  -Feeling extremely tired  -Yellowing of the skin and/or eyes  -Feeling \"not quite right\" or that something is wrong  -An extreme amount of swelling (some swelling in pregnancy is very normal)    If your midwife feels that you are developing preeclampsia, you will have lab tests drawn and will be monitored very closely.     If you are experiencing these symptoms, please call the [facility] immediately at [phone].      SIGNS OF  LABOR    Labor is  if it happens more than three weeks before your due date.    It can be hard to know if you are in labor, since the symptoms can be like the normal feelings of pregnancy.  Often, the only difference is the symptoms increase or they don't go away.     Signs of  labor can include:      Contractions which can feel like period cramps or gas pain.  You may feel it in the lower part of your abdomen, in your back, or as a pressure feeling in your bottom.  It is often regular, coming every 5 or 10 minutes, and  lasting about 30-60 seconds. Some contractions are normal during pregnancy ([place] rico contractions) but if you are feeling more than 5-6 in one hour that is NOT normal    If this occurs empty your bladder, then drink 2-3 glasses of water, eat a snack, and lay down on your left side. Put your hand on your " abdomen to count the contractions.  If after one hour of resting you have still had 5-6 contractions call your clinic right away.      If you feel a pop, gush, or trickle of fluid it may mean that your bag of water has broken and you should contact the clinic       You may also experience loose stools and/or rectal pressure       Listen to your body, if something doesn't seem right please call us at the clinic    Risk Factors      Previous  delivery    Bacterial Vaginosis- if you notice a fishy smell to your discharge or experience vaginal itching/discomfort you should be evaluated for infection    Smoking    Drug abuse    Adolescent (teen) pregnancy or advanced maternal age (AMA) age 35 and over    Dehydration (this may not cause  labor but it can cause contractions)    If you think you are in  labor we may do some lab testing in the clinic or send you to the hospital for evaluation    Please call us if you are concerned you are in  labor.    [facility]  [phone]

## 2017-12-13 ENCOUNTER — TELEPHONE (OUTPATIENT)
Dept: NURSING | Facility: CLINIC | Age: 27
End: 2017-12-13

## 2017-12-13 NOTE — TELEPHONE ENCOUNTER
Pt calling and questioning if she can use OTC monistat creme for a yeast infection. She has itching and white discharge.  Discussed w/ Juana FERGUSON- Have the pt try Monistat 3 or 7 day. If no help she will need to be seen in the clinic.  Reviewed recommendation from Juana koch/ pt. She voices understanding.

## 2017-12-21 ENCOUNTER — APPOINTMENT (OUTPATIENT)
Dept: ULTRASOUND IMAGING | Facility: CLINIC | Age: 27
End: 2017-12-21
Attending: NURSE PRACTITIONER
Payer: COMMERCIAL

## 2017-12-21 ENCOUNTER — HOSPITAL ENCOUNTER (OUTPATIENT)
Facility: CLINIC | Age: 27
Discharge: HOME | End: 2017-12-21
Attending: ADVANCED PRACTICE MIDWIFE | Admitting: ADVANCED PRACTICE MIDWIFE
Payer: COMMERCIAL

## 2017-12-21 VITALS
DIASTOLIC BLOOD PRESSURE: 77 MMHG | WEIGHT: 165 LBS | BODY MASS INDEX: 27.49 KG/M2 | HEIGHT: 65 IN | RESPIRATION RATE: 16 BRPM | SYSTOLIC BLOOD PRESSURE: 126 MMHG | TEMPERATURE: 98.2 F

## 2017-12-21 LAB — A1 MICROGLOB PLACENTAL VAG QL: NEGATIVE

## 2017-12-21 PROCEDURE — 99213 OFFICE O/P EST LOW 20 MIN: CPT | Mod: 25 | Performed by: ADVANCED PRACTICE MIDWIFE

## 2017-12-21 PROCEDURE — 99214 OFFICE O/P EST MOD 30 MIN: CPT | Mod: 25

## 2017-12-21 PROCEDURE — 76819 FETAL BIOPHYS PROFIL W/O NST: CPT

## 2017-12-21 PROCEDURE — 59025 FETAL NON-STRESS TEST: CPT | Mod: 59

## 2017-12-21 PROCEDURE — 84112 EVAL AMNIOTIC FLUID PROTEIN: CPT | Performed by: ADVANCED PRACTICE MIDWIFE

## 2017-12-21 PROCEDURE — 59025 FETAL NON-STRESS TEST: CPT | Mod: 26 | Performed by: ADVANCED PRACTICE MIDWIFE

## 2017-12-21 RX ORDER — ONDANSETRON 2 MG/ML
4 INJECTION INTRAMUSCULAR; INTRAVENOUS EVERY 6 HOURS PRN
Status: DISCONTINUED | OUTPATIENT
Start: 2017-12-21 | End: 2017-12-21 | Stop reason: HOSPADM

## 2017-12-21 NOTE — DISCHARGE INSTRUCTIONS
Discharge Instruction for Undelivered Patients      You were seen for: Membrane Assessment  We Consulted: Juana Hollis  You had (Test or Medicine):NST, BPP and amnisure     Diet:   Drink 8 to 12 glasses of liquids (milk, juice, water) every day.  You may eat meals and snacks.     Activity:  Call your doctor or nurse midwife if your baby is moving less than usual.     Call your provider if you notice:  Swelling in your face or increased swelling in your hands or legs.  Headaches that are not relieved by Tylenol (acetaminophen).  Changes in your vision (blurring: seeing spots or stars.)  Nausea (sick to your stomach) and vomiting (throwing up).   Weight gain of 5 pounds or more per week.  Heartburn that doesn't go away.  Signs of bladder infection: pain when you urinate (use the toilet), need to go more often and more urgently.  The bag of nathan (rupture of membranes) breaks, or you notice leaking in your underwear.  Bright red blood in your underwear.  Abdominal (lower belly) or stomach pain.  For first baby: Contractions (tightening) less than 5 minutes apart for one hour or more.  Second (plus) baby: Contractions (tightening) less than 10 minutes apart and getting stronger.  *If less than 34 weeks: Contractions (tightenings) more than 6 times in one hour.  Increase or change in vaginal discharge (note the color and amount)      Follow-up:  As scheduled in the clinic- Tuesday

## 2017-12-21 NOTE — IP AVS SNAPSHOT
MRN:[id number]                      After Visit Summary   12/21/2017    Haritha Bateman    MRN: [id number]           Thank you!     Thank you for choosing [facility] for your care. Our goal is always to provide you with excellent care. Hearing back from our patients is one way we can continue to improve our services. Please take a few minutes to complete the written survey that you may receive in the mail after you visit with us. Thank you!        Patient Information     Date Of Birth          1990        About your hospital stay     You were admitted on:  December 21, 2017 You last received care in the:  [facility]    You were discharged on:  December 21, 2017       Who to Call     For medical emergencies, please call 911.  For non-urgent questions about your medical care, please call your primary care provider or clinic, None          Attending Provider     Provider Specialty    Juana Hollis APRN CNM [facility]       Primary Care Provider Fax #    Physician No Ref-Primary [phone]      Your next 10 appointments already scheduled     Dec 26, 2017  2:30 PM CST   ESTABLISHED PRENATAL with ABIMAEL Bonds CNM   [facility] ([facility]    [address] [place] [id number] [phone]              Further instructions from your care team       Discharge Instruction for Undelivered Patients      You were seen for: Membrane Assessment  We Consulted: Juana Hollis  You had (Test or Medicine):NST, BPP and amnisure     Diet:   Drink 8 to 12 glasses of liquids (milk, juice, water) every day.  You may eat meals and snacks.     Activity:  Call your doctor or nurse midwife if your baby is moving less than usual.     Call your provider if you notice:  Swelling in your face or increased swelling in your hands or legs.  Headaches that are not relieved by Tylenol (acetaminophen).  Changes in your vision  "(blurring: seeing spots or stars.)  Nausea (sick to your stomach) and vomiting (throwing up).   Weight gain of 5 pounds or more per week.  Heartburn that doesn't go away.  Signs of bladder infection: pain when you urinate (use the toilet), need to go more often and more urgently.  The bag of nathan (rupture of membranes) breaks, or you notice leaking in your underwear.  Bright red blood in your underwear.  Abdominal (lower belly) or stomach pain.  For first baby: Contractions (tightening) less than 5 minutes apart for one hour or more.  Second (plus) baby: Contractions (tightening) less than 10 minutes apart and getting stronger.  *If less than 34 weeks: Contractions (tightenings) more than 6 times in one hour.  Increase or change in vaginal discharge (note the color and amount)      Follow-up:  As scheduled in the clinic- Tuesday          Pending Results     Date and Time Order Name Status Description    12/21/2017 0855 US Fetal Profile w/o Non-Stress-Radiology Performed In process             Admission Information     Date & Time Provider Department Dept. Phone    [facility]21[phone]      Your Vitals Were     Blood Pressure Temperature Respirations Height Weight Last Period    126/77 98.2  F (36.8  C) (Temporal) 16 1.651 m (5' 5\") 74.8 kg (165 lb) 05/04/2017    BMI (Body Mass Index)                   27.46 kg/m2           [employer] Information     [employer] gives you secure access to your electronic health record. If you see a primary care provider, you can also send messages to your care team and make appointments. If you have questions, please call your primary care clinic.  If you do not have a primary care provider, please call [phone] and they will assist you.        Care EveryWhere ID     This is your Care EveryWhere ID. This could be used by other organizations to access your [facility] medical records  [id number]        Equal Access to Services     " ADEOLA IBRAHIM : Hadii aad luz corazon Sokrisali, waaxda luqadaha, qaybta kaalmada adescooter, cayetano andry smithjenniferdimitri amador . So [facility] [phone].    ATENCIÓN: Si habla español, tiene a mishra disposición servicios gratuitos de asistencia lingüística. Llame al [phone].    We comply with applicable federal civil rights laws and [place] laws. We do not discriminate on the basis of race, color, national origin, age, disability, sex, sexual orientation, or gender identity.               Review of your medicines      UNREVIEWED medicines. Ask your doctor about these medicines        Dose / Directions    IRON SUPPLEMENT PO        Take by mouth daily (with breakfast)   Refills:  0       PRENATAL PLUS 27-1 MG Tabs        Refills:  0       VITAMIN B 12 PO        Take by mouth daily   Refills:  0       VITAMIN C PO        Take by mouth daily   Refills:  0         CONTINUE these medicines which have NOT CHANGED        Dose / Directions    order for DME   Used for:  Low back pain during pregnancy in third trimester        Equipment being ordered: One pregnancy support belt for low back pain in pregnancy.   Quantity:  1 Device   Refills:  0                Protect others around you: Learn how to safely use, store and throw away your medicines at www.disposemymeds.org.             Medication List: This is a list of all your medications and when to take them. Check marks below indicate your daily home schedule. Keep this list as a reference.      Medications           Morning Afternoon Evening Bedtime As Needed    IRON SUPPLEMENT PO   Take by mouth daily (with breakfast)                                order for DME   Equipment being ordered: One pregnancy support belt for low back pain in pregnancy.                                PRENATAL PLUS 27-1 MG Tabs                                VITAMIN B 12 PO   Take by mouth daily                                VITAMIN C PO   Take by mouth daily

## 2017-12-21 NOTE — IP AVS SNAPSHOT
[address], [address]    [facility] [address]    Phone:  [phone]                                       After Visit Summary   12/21/2017    Haritha Bateman    MRN: [id number]           After Visit Summary Signature Page     I have received my discharge instructions, and my questions have been answered. I have discussed any challenges I see with this plan with the nurse or doctor.    ..........................................................................................................................................  Patient/Patient Representative Signature      ..........................................................................................................................................  Patient Representative Print Name and Relationship to Patient    ..................................................               ................................................  Date                                            Time    ..........................................................................................................................................  Reviewed by Signature/Title    ...................................................              ..............................................  Date                                                            Time

## 2017-12-21 NOTE — PROGRESS NOTES
"MATERNAL ASSESSMENT CENTER CNM TRIAGE NOTE    Haritha Bateman is a 27 year old  with and IUP at 33w0d who presents with complaint of leaking of fluid since last night, 17 and decreased fetal movement.     Haritha recently had a yeast infection and was treated with Monistat; she states symptoms have resolved. She notes that yesterday, she noted some clear fluid dripping down her leg, and when she woke up this morning, her underwear was wet.    Patient states baby has been moving but has been less active since yesterday.  Denies ROM   Denies vaginal bleeding  Denies contractions  [facility]     Problems this pregnancy:   1. Anemia complicating pregnancy    ROS:  Patient is alert and oriented    PHYSICAL EXAM:  /77  Temp 98.2  F (36.8  C) (Temporal)  Resp 16  Ht 1.651 m (5' 5\")  Wt 74.8 kg (165 lb)  LMP 2017  BMI 27.46 kg/m2    FHT's 140 with moderate variability  Accelerations: one accel  Decelerations:  absent        Contractions: Pt is not bennie     Abdomen: gravid  Bloody show: no  Cervix: Deferred  Membranes are intact        ASSESSMENT :   27 year old  with floyd IUP 33w0d not in labor  Amnisure negative  NST non-reactive; BPP 8/8  GBS unknown and membranes intact      PLAN:  Discharge home  Continue routine prenatal care; keep next prenatal appointment on 17.    Advised Haritha to call or present to MAC if she notes that baby's movement are decreased again. Instructed to please refer to the discharge handouts, the RN triage line or on-call CNM for any questions or concerns.  Pt verbalizes understanding and agreement with current plan of care.    ABIMAEL Castellon CNM      Face to Face time with patient: 10 minutes. >50% of time spent counseling.  "

## 2017-12-21 NOTE — PLAN OF CARE
Genevieve at 33 wks presents to triage c/o leaking fluid since last evening. Pt noted that she has also felt decreased fetal movement since yesterday. POC discussed with pt and . Monitors applied, assessment obtained.  Reassuring but Non-reactive fetal tracing, orders received for BPP. Negative amnisure, BPP 8/8. Updated Juana Hollis. Orders received to discharge to home. Labor instructions given as well as discharge instructions and importance of calling if not obtaining appropriate kick counts. Verbalized understanding. DC to home.

## 2017-12-26 ENCOUNTER — PRENATAL OFFICE VISIT (OUTPATIENT)
Dept: MIDWIFE SERVICES | Facility: CLINIC | Age: 27
End: 2017-12-26
Payer: COMMERCIAL

## 2017-12-26 VITALS — WEIGHT: 177 LBS | DIASTOLIC BLOOD PRESSURE: 80 MMHG | BODY MASS INDEX: 29.45 KG/M2 | SYSTOLIC BLOOD PRESSURE: 118 MMHG

## 2017-12-26 DIAGNOSIS — O09.93 SUPERVISION OF HIGH RISK PREGNANCY IN THIRD TRIMESTER: Primary | ICD-10-CM

## 2017-12-26 DIAGNOSIS — O99.013 ANEMIA COMPLICATING PREGNANCY IN THIRD TRIMESTER: ICD-10-CM

## 2017-12-26 DIAGNOSIS — Z3A.33 33 WEEKS GESTATION OF PREGNANCY: ICD-10-CM

## 2017-12-26 PROCEDURE — 99207 ZZC PRENATAL VISIT: CPT | Performed by: ADVANCED PRACTICE MIDWIFE

## 2017-12-26 RX ORDER — VITAMIN A ACETATE, BETA CAROTENE, ASCORBIC ACID, CHOLECALCIFEROL, .ALPHA.-TOCOPHEROL ACETATE, DL-, THIAMINE MONONITRATE, RIBOFLAVIN, NIACINAMIDE, PYRIDOXINE HYDROCHLORIDE, FOLIC ACID, CYANOCOBALAMIN, CALCIUM CARBONATE, FERROUS FUMARATE, ZINC OXIDE, CUPRIC OXIDE 3080; 12; 120; 400; 1; 1.84; 3; 20; 22; 920; 25; 200; 27; 10; 2 [IU]/1; UG/1; MG/1; [IU]/1; MG/1; MG/1; MG/1; MG/1; MG/1; [IU]/1; MG/1; MG/1; MG/1; MG/1; MG/1
1 TABLET, FILM COATED ORAL DAILY
Qty: 100 TABLET | Refills: 3 | Status: SHIPPED | OUTPATIENT
Start: 2017-12-26

## 2017-12-26 NOTE — PATIENT INSTRUCTIONS
Fetal Kick Counts    It is important to know when your baby's movements occur. We often get busy with work and life and do not pay close attention to their movements.        Women typically begin feeling movement between 18-22 weeks of gestation, sometimes it can be earlier or later depending on where your placenta is       Movements usually begin feeling like popping or fluttering and as the baby grows they become more pronounced    Toward the end of pregnancy as the baby gets larger they may not move as much or make as big of movements. Babies have maturing sleep cycles as well as not as much room to move and flip. If you are ever concerned about your baby's movements or have not felt the baby move for a while, we recommend you do a fetal kick count. Prior to starting your count drink a glass of water or juice and eat a snack. Then lay down on your side and begin to count movements.     How to do a Fetal Kick Counts    There are many different ways to monitor your baby's movements. Movements can range from large jabs to small kicks, or wiggles.  Hiccups count!      Count 10 movements in 2 hours when resting and focusing    Count 10 movements in 12 hours when doing normal activity    We recommend that if movements occur but seem decreased that you should be seen in the clinic or hospital for evaluation within 12 hours. If fetal movement is absent or fetal kick counts are low please contact us right away.    If you ever have any concerns about your baby's movements DO NOT HESITATE to call us, we are here for you!    [facility]  [phone]

## 2017-12-26 NOTE — MR AVS SNAPSHOT
After Visit Summary   12/26/2017    Haritha Bateman    MRN: [id number]           Patient Information     Date Of Birth          1990        Visit Information        Provider Department      12/26/2017 2:30 PM Romi Goldsmith APRN CNM [facility]        Today's Diagnoses     Supervision of high risk pregnancy in third trimester    -  1    Anemia complicating pregnancy in third trimester        33 weeks gestation of pregnancy          Care Instructions    Fetal Kick Counts    It is important to know when your baby's movements occur. We often get busy with work and life and do not pay close attention to their movements.        Women typically begin feeling movement between 18-22 weeks of gestation, sometimes it can be earlier or later depending on where your placenta is       Movements usually begin feeling like popping or fluttering and as the baby grows they become more pronounced    Toward the end of pregnancy as the baby gets larger they may not move as much or make as big of movements. Babies have maturing sleep cycles as well as not as much room to move and flip. If you are ever concerned about your baby's movements or have not felt the baby move for a while, we recommend you do a fetal kick count. Prior to starting your count drink a glass of water or juice and eat a snack. Then lay down on your side and begin to count movements.     How to do a Fetal Kick Counts    There are many different ways to monitor your baby's movements. Movements can range from large jabs to small kicks, or wiggles.  Hiccups count!      Count 10 movements in 2 hours when resting and focusing    Count 10 movements in 12 hours when doing normal activity    We recommend that if movements occur but seem decreased that you should be seen in the clinic or hospital for evaluation within 12 hours. If fetal movement is absent or fetal kick counts are low please contact us right away.    If you ever  "have any concerns about your baby's movements DO NOT HESITATE to call us, we are here for you!    [facility]  [phone]                Follow-ups after your visit        Your next 10 appointments already scheduled     2018 11:40 AM CST   ESTABLISHED PRENATAL with ABIMAEL Mckeon CNM   [facility] ([facility])    [address] [place] [id number] [phone]              Who to contact     If you have questions or need follow up information about today's clinic visit or your schedule please contact [facility] directly at [phone].  Normal or non-critical lab and imaging results will be communicated to you by MyChart, letter or phone within 4 business days after the clinic has received the results. If you do not hear from us within 7 days, please contact the clinic through MyChart or phone. If you have a critical or abnormal lab result, we will notify you by phone as soon as possible.  Submit refill requests through [employer] or call your pharmacy and they will forward the refill request to us. Please allow 3 business days for your refill to be completed.          Additional Information About Your Visit        [employer] Information     [employer] lets you send messages to your doctor, view your test results, renew your prescriptions, schedule appointments and more. To sign up, go to [email or web address] . Click on \"Log in\" on the left side of the screen, which will take you to the Welcome page. Then click on \"Sign up Now\" on the right side of the page.     You will be asked to enter the access code listed below, as well as some personal information. Please follow the directions to create your username and password.     Your access code is: [email or web address]  Expires: 3/26/2018  2:30 PM     Your access code will  in 90 days. If you need help or a new code, please call your [facility] clinic or " [phone].        Care EveryWhere ID     This is your Care EveryWhere ID. This could be used by other organizations to access your [facility] medical records  [id number]        Your Vitals Were     Last Period BMI (Body Mass Index)                05/04/2017 29.45 kg/m2           Blood Pressure from Last 3 Encounters:   12/26/17 118/80   12/21/17 126/77   12/12/17 114/70    Weight from Last 3 Encounters:   12/26/17 177 lb (80.3 kg)   12/21/17 165 lb (74.8 kg)   12/12/17 167 lb 12.8 oz (76.1 kg)              Today, you had the following     No orders found for display       Primary Care Provider Fax #    Physician No Ref-Primary [phone]       No address on file        Equal Access to Services     ADEOLA IBRAHIM : Nieves Hamilton, [place] luqadaha, qaybta kaalmada neri, cayetano amador . So [facility] [phone].    ATENCIÓN: Si habla español, tiene a mishra disposición servicios gratuitos de asistencia lingüística. Llame al [phone].    We comply with applicable federal civil rights laws and [place] laws. We do not discriminate on the basis of race, color, national origin, age, disability, sex, sexual orientation, or gender identity.            Thank you!     Thank you for choosing [facility]  for your care. Our goal is always to provide you with excellent care. Hearing back from our patients is one way we can continue to improve our services. Please take a few minutes to complete the written survey that you may receive in the mail after your visit with us. Thank you!             Your Updated Medication List - Protect others around you: Learn how to safely use, store and throw away your medicines at www.disposemymeds.org.          This list is accurate as of: 12/26/17  2:30 PM.  Always use your most recent med list.                   Brand Name Dispense Instructions for use Diagnosis    IRON SUPPLEMENT PO      Take by mouth daily (with breakfast)         order for DME     1 Device    Equipment being ordered: One pregnancy support belt for low back pain in pregnancy.    Low back pain during pregnancy in third trimester       PRENATAL PLUS 27-1 MG Tabs           VITAMIN B 12 PO      Take by mouth daily        VITAMIN C PO      Take by mouth daily

## 2018-01-09 ENCOUNTER — PRENATAL OFFICE VISIT (OUTPATIENT)
Dept: MIDWIFE SERVICES | Facility: CLINIC | Age: 28
End: 2018-01-09
Payer: COMMERCIAL

## 2018-01-09 VITALS — BODY MASS INDEX: 29.99 KG/M2 | WEIGHT: 180.2 LBS | SYSTOLIC BLOOD PRESSURE: 112 MMHG | DIASTOLIC BLOOD PRESSURE: 78 MMHG

## 2018-01-09 DIAGNOSIS — O09.93 SUPERVISION OF HIGH RISK PREGNANCY IN THIRD TRIMESTER: Primary | ICD-10-CM

## 2018-01-09 DIAGNOSIS — O99.013 ANEMIA COMPLICATING PREGNANCY IN THIRD TRIMESTER: ICD-10-CM

## 2018-01-09 DIAGNOSIS — Z78.9 BREASTFEEDING (INFANT): ICD-10-CM

## 2018-01-09 DIAGNOSIS — Z36.85 SCREENING, ANTENATAL, FOR STREPTOCOCCUS B: ICD-10-CM

## 2018-01-09 LAB
ERYTHROCYTE [DISTWIDTH] IN BLOOD BY AUTOMATED COUNT: 17.2 % (ref 10–15)
HCT VFR BLD AUTO: 36.1 % (ref 35–47)
HGB BLD-MCNC: 12.2 G/DL (ref 11.7–15.7)
MCH RBC QN AUTO: 30.2 PG (ref 26.5–33)
MCHC RBC AUTO-ENTMCNC: 33.8 G/DL (ref 31.5–36.5)
MCV RBC AUTO: 89 FL (ref 78–100)
PLATELET # BLD AUTO: 186 10E9/L (ref 150–450)
RBC # BLD AUTO: 4.04 10E12/L (ref 3.8–5.2)
WBC # BLD AUTO: 7.5 10E9/L (ref 4–11)

## 2018-01-09 PROCEDURE — 85027 COMPLETE CBC AUTOMATED: CPT | Performed by: ADVANCED PRACTICE MIDWIFE

## 2018-01-09 PROCEDURE — 99207 ZZC PRENATAL VISIT: CPT | Performed by: ADVANCED PRACTICE MIDWIFE

## 2018-01-09 PROCEDURE — 36415 COLL VENOUS BLD VENIPUNCTURE: CPT | Performed by: ADVANCED PRACTICE MIDWIFE

## 2018-01-09 PROCEDURE — 87653 STREP B DNA AMP PROBE: CPT | Performed by: ADVANCED PRACTICE MIDWIFE

## 2018-01-09 NOTE — MR AVS SNAPSHOT
"              After Visit Summary   2018    Haritha Bateman    MRN: [id number]           Patient Information     Date Of Birth          1990        Visit Information        Provider Department      2018 11:40 AM Juana Hollis APRN CNM [facility]        Today's Diagnoses     Supervision of high risk pregnancy in third trimester    -  1    Anemia complicating pregnancy in third trimester        Screening, , for Streptococcus B        Lactating mother        Breastfeeding (infant)          Care Instructions    Labor Instructions for Midwife Patients    When to call:  Both during and after office hours call  [phone]. There is a triage RN to take your calls and answer your questions 24 hours a day.  If she cannot answer your question she will page the midwife on call for you.    When to call:  Call anytime you have important concerns about you or your baby.     Call if:    You are having contractions at regular intervals about 5-6 minutes apart lasting 30-60 seconds and becoming increasingly more intense     You have an uncontrollable gush of fluid from your vagina or feel a pop and gush like your water has broken    You have HEAVY bleeding, like heavy period, blood running down your legs, or  soaking a pad.     Some bleeding after a pelvic exam, after intercourse, or in labor when your cervix is dilating is normal and is referred to as \"bloody show\"    You have severe, continuous back or abdominal pain    You feel it is time to go to the hospital    If this is your first labor, call when contractions are very intense and have been about every 3-4 minutes for about an hour    If it is your second labor or more, call when contractions are strong and about every 3-5 minutes or sooner depending on your level of discomfort.     Keep in mind we are always here for you! If you have questions, concerns please don't hesitate to call us.     What to eat/drink in labor: Drink " plenty of fluid (water most importantly, juice, soda or tea without caffeine). Eat rice, pasta, soup, cereal, bread/toast, and fruit. Avoid dairy and greasy food as they are difficult to digest and you may experience some nausea during labor.    Comfort measures:    Baths and showers (ok even with ruptured membranes, it may temporarily slow contractions if you are still in the early stage of labor)    Warm/hot packs for back pain or discomfort    Back, belly, or thigh massages    Standing, rocking, walking, leaning over bed or tables, side-lying and sleeping    Miscellaneous:     Contractions are timed from the beginning of one to the beginning of the next    Try hard to sleep during the early stage of labor when you are not that uncomfortable. Timing of contractions at this point is not important    Even if you cannot sleep, resting in bed or on the couch can help you maintain your energy for labor    When you arrive at the hospital the nurse will check your baby's heartbeat, check your cervix, and will call us. The midwife on call will come in and be with you when you are in active labor    After hours you need to enter the hospital through the emergency room        Fetal Kick Counts    It is important to know when your baby's movements occur. We often get busy with work and life and do not pay close attention to their movements.        Women typically begin feeling movement between 18-22 weeks of gestation, sometimes it can be earlier or later depending on where your placenta is       Movements usually begin feeling like popping or fluttering and as the baby grows they become more pronounced    Toward the end of pregnancy as the baby gets larger they may not move as much or make as big of movements. Babies have maturing sleep cycles as well as not as much room to move and flip. If you are ever concerned about your baby's movements or have not felt the baby move for a while, we recommend you do a fetal kick count.  Prior to starting your count drink a glass of water or juice and eat a snack. Then lay down on your side and begin to count movements.     How to do a Fetal Kick Counts    There are many different ways to monitor your baby's movements. Movements can range from large jabs to small kicks, or wiggles.  Hiccups count!      Count 10 movements in 2 hours when resting and focusing    Count 10 movements in 12 hours when doing normal activity    We recommend that if movements occur but seem decreased that you should be seen in the clinic or hospital for evaluation within 12 hours. If fetal movement is absent or fetal kick counts are low please contact us right away.    If you ever have any concerns about your baby's movements DO NOT HESITATE to call us, we are here for you!    [facility]  [phone]                Follow-ups after your visit        Follow-up notes from your care team     Return in about 1 week (around 1/16/2018).      Your next 10 appointments already scheduled     Jan 16, 2018  3:40 PM CST   ESTABLISHED PRENATAL with ABIMAEL Bonds CNM   [facility] ([facility])    [address] [place] [id number] [phone]              Who to contact     If you have questions or need follow up information about today's clinic visit or your schedule please contact [facility] directly at [phone].  Normal or non-critical lab and imaging results will be communicated to you by MyChart, letter or phone within 4 business days after the clinic has received the results. If you do not hear from us within 7 days, please contact the clinic through MyChart or phone. If you have a critical or abnormal lab result, we will notify you by phone as soon as possible.  Submit refill requests through [employer] or call your pharmacy and they will forward the refill request to us. Please allow 3 business days for your  refill to be completed.          Additional Information About Your Visit        [employer] Information     [facility] gives you secure access to your electronic health record. If you see a primary care provider, you can also send messages to your care team and make appointments. If you have questions, please call your primary care clinic.  If you do not have a primary care provider, please call [phone] and they will assist you.        Care EveryWhere ID     This is your Care EveryWhere ID. This could be used by other organizations to access your [facility] medical records  [id number]        Your Vitals Were     Last Period BMI (Body Mass Index)                05/04/2017 29.99 kg/m2           Blood Pressure from Last 3 Encounters:   01/09/18 112/78   12/26/17 118/80   12/21/17 126/77    Weight from Last 3 Encounters:   01/09/18 180 lb 3.2 oz (81.7 kg)   12/26/17 177 lb (80.3 kg)   12/21/17 165 lb (74.8 kg)              We Performed the Following     CBC with platelets     Group B strep PCR          Today's Medication Changes          These changes are accurate as of: 1/9/18 12:28 PM.  If you have any questions, ask your nurse or doctor.               These medicines have changed or have updated prescriptions.        Dose/Directions    * order for DME   This may have changed:  Another medication with the same name was added. Make sure you understand how and when to take each.   Used for:  Low back pain during pregnancy in third trimester        Equipment being ordered: One pregnancy support belt for low back pain in pregnancy.   Quantity:  1 Device   Refills:  0       * order for DME   This may have changed:  You were already taking a medication with the same name, and this prescription was added. Make sure you understand how and when to take each.   Used for:  Lactating mother, Breastfeeding (infant)        Equipment being ordered: One standard double electric breast pump with accessories, to be used for 99 years    Quantity:  1 Device   Refills:  0       * Notice:  This list has 2 medication(s) that are the same as other medications prescribed for you. Read the directions carefully, and ask your doctor or other care provider to review them with you.         Where to get your medicines      Some of these will need a paper prescription and others can be bought over the counter.  Ask your nurse if you have questions.     Bring a paper prescription for each of these medications     order for DME                Primary Care Provider Fax #    Physician No Ref-Primary [phone]       No address on file        Equal Access to Services     ADEOLA IBRAHIM : Hadii aad ku hadasho Soomaali, waaxda luqadaha, qaybta kaalmada adeegyada, cayetano amador . So [facility] [phone].    ATENCIÓN: Si habla español, tiene a mishra disposición servicios gratuitos de asistencia lingüística. Llame al [phone].    We comply with applicable federal civil rights laws and [place] laws. We do not discriminate on the basis of race, color, national origin, age, disability, sex, sexual orientation, or gender identity.            Thank you!     Thank you for choosing [facility]  for your care. Our goal is always to provide you with excellent care. Hearing back from our patients is one way we can continue to improve our services. Please take a few minutes to complete the written survey that you may receive in the mail after your visit with us. Thank you!             Your Updated Medication List - Protect others around you: Learn how to safely use, store and throw away your medicines at www.disposemymeds.org.          This list is accurate as of: 1/9/18 12:28 PM.  Always use your most recent med list.                   Brand Name Dispense Instructions for use Diagnosis    IRON SUPPLEMENT PO      Take by mouth daily (with breakfast)        * order for DME     1 Device    Equipment being ordered: One pregnancy support  belt for low back pain in pregnancy.    Low back pain during pregnancy in third trimester       * order for DME     1 Device    Equipment being ordered: One standard double electric breast pump with accessories, to be used for 99 years    Lactating mother, Breastfeeding (infant)       PRENATAL PLUS 27-1 MG Tabs     100 tablet    Take 1 tablet by mouth daily    Supervision of high risk pregnancy in third trimester, 33 weeks gestation of pregnancy       VITAMIN B 12 PO      Take by mouth daily        VITAMIN C PO      Take by mouth daily        * Notice:  This list has 2 medication(s) that are the same as other medications prescribed for you. Read the directions carefully, and ask your doctor or other care provider to review them with you.

## 2018-01-09 NOTE — PROGRESS NOTES
"Feels well, finished the nursery!  Haritha states that she feels that her hemoglobin might be getting low again; hgb drawn today.  Fetal movement: positive  Denies bleeding/lof, a little contractions, some cramping over the past three days, \"comes and goes, two or three times a day.\"  Discussed extra hydration and when to call.  GBS swab done today  Vaginal exam done, confirmed vertex via bedside ultrasound  Cx:  FT/30%/-2   Labor instructions given  Perineal massage reviewed and instructions given  Labor preferences/birth plan reviewed: Wants to try nitrous oxide, open to all pain relief options including epidural.  Not sure about hydrotherapy.   Breast pump Rx: yes, given.    Warning signs reviewed  Patient denies S&S of PIH and aware of what to report   Return to clinic in 1 week    Juana Hollis, DNP, APRN, CNM            "

## 2018-01-09 NOTE — PATIENT INSTRUCTIONS
"Labor Instructions for Midwife Patients    When to call:  Both during and after office hours call  [phone]. There is a triage RN to take your calls and answer your questions 24 hours a day.  If she cannot answer your question she will page the midwife on call for you.    When to call:  Call anytime you have important concerns about you or your baby.     Call if:    You are having contractions at regular intervals about 5-6 minutes apart lasting 30-60 seconds and becoming increasingly more intense     You have an uncontrollable gush of fluid from your vagina or feel a pop and gush like your water has broken    You have HEAVY bleeding, like heavy period, blood running down your legs, or  soaking a pad.     Some bleeding after a pelvic exam, after intercourse, or in labor when your cervix is dilating is normal and is referred to as \"bloody show\"    You have severe, continuous back or abdominal pain    You feel it is time to go to the hospital    If this is your first labor, call when contractions are very intense and have been about every 3-4 minutes for about an hour    If it is your second labor or more, call when contractions are strong and about every 3-5 minutes or sooner depending on your level of discomfort.     Keep in mind we are always here for you! If you have questions, concerns please don't hesitate to call us.     What to eat/drink in labor: Drink plenty of fluid (water most importantly, juice, soda or tea without caffeine). Eat rice, pasta, soup, cereal, bread/toast, and fruit. Avoid dairy and greasy food as they are difficult to digest and you may experience some nausea during labor.    Comfort measures:    Baths and showers (ok even with ruptured membranes, it may temporarily slow contractions if you are still in the early stage of labor)    Warm/hot packs for back pain or discomfort    Back, belly, or thigh massages    Standing, rocking, walking, leaning over bed or tables, side-lying and " sleeping    Miscellaneous:     Contractions are timed from the beginning of one to the beginning of the next    Try hard to sleep during the early stage of labor when you are not that uncomfortable. Timing of contractions at this point is not important    Even if you cannot sleep, resting in bed or on the couch can help you maintain your energy for labor    When you arrive at the hospital the nurse will check your baby's heartbeat, check your cervix, and will call us. The midwife on call will come in and be with you when you are in active labor    After hours you need to enter the hospital through the emergency room        Fetal Kick Counts    It is important to know when your baby's movements occur. We often get busy with work and life and do not pay close attention to their movements.        Women typically begin feeling movement between 18-22 weeks of gestation, sometimes it can be earlier or later depending on where your placenta is       Movements usually begin feeling like popping or fluttering and as the baby grows they become more pronounced    Toward the end of pregnancy as the baby gets larger they may not move as much or make as big of movements. Babies have maturing sleep cycles as well as not as much room to move and flip. If you are ever concerned about your baby's movements or have not felt the baby move for a while, we recommend you do a fetal kick count. Prior to starting your count drink a glass of water or juice and eat a snack. Then lay down on your side and begin to count movements.     How to do a Fetal Kick Counts    There are many different ways to monitor your baby's movements. Movements can range from large jabs to small kicks, or wiggles.  Hiccups count!      Count 10 movements in 2 hours when resting and focusing    Count 10 movements in 12 hours when doing normal activity    We recommend that if movements occur but seem decreased that you should be seen in the clinic or hospital for  evaluation within 12 hours. If fetal movement is absent or fetal kick counts are low please contact us right away.    If you ever have any concerns about your baby's movements DO NOT HESITATE to call us, we are here for you!    [facility]  [phone]

## 2018-01-10 PROBLEM — O09.93 SUPERVISION OF HIGH RISK PREGNANCY IN THIRD TRIMESTER: Status: ACTIVE | Noted: 2017-09-22

## 2018-01-10 LAB
GP B STREP DNA SPEC QL NAA+PROBE: NEGATIVE
SPECIMEN SOURCE: NORMAL

## 2018-01-16 ENCOUNTER — PRENATAL OFFICE VISIT (OUTPATIENT)
Dept: MIDWIFE SERVICES | Facility: CLINIC | Age: 28
End: 2018-01-16
Payer: COMMERCIAL

## 2018-01-16 VITALS — DIASTOLIC BLOOD PRESSURE: 74 MMHG | WEIGHT: 186 LBS | BODY MASS INDEX: 30.95 KG/M2 | SYSTOLIC BLOOD PRESSURE: 112 MMHG

## 2018-01-16 DIAGNOSIS — O09.93 SUPERVISION OF HIGH RISK PREGNANCY IN THIRD TRIMESTER: Primary | ICD-10-CM

## 2018-01-16 DIAGNOSIS — Z3A.36 36 WEEKS GESTATION OF PREGNANCY: ICD-10-CM

## 2018-01-16 DIAGNOSIS — O99.013 ANEMIA COMPLICATING PREGNANCY IN THIRD TRIMESTER: ICD-10-CM

## 2018-01-16 PROCEDURE — 99207 ZZC PRENATAL VISIT: CPT | Performed by: ADVANCED PRACTICE MIDWIFE

## 2018-01-16 NOTE — PATIENT INSTRUCTIONS
NATURAL LABOR PREPARATION    So, you're getting closer and closer to your due date and wondering what you can do to help get your body ready for labor.   Here are some natural methods you can try:    NOTE: DO NOT begin any of the following prior to 36 completed weeks of pregnancy!    Evening Primrose Oil: Take 1000mg by mouth three times per day. This encourages the cervix to ripen. Cervical ripening is when your cervix becomes more soft and stretchy to get ready for dilation and effacement.     Red Raspberry Leaf Tea: Red raspberry tea (get it at a Co-op or in the grocery store). Drink three cups per day (hot or cold). This can help to prepare the uterine muscles for labor.

## 2018-01-16 NOTE — MR AVS SNAPSHOT
After Visit Summary   1/16/2018    Haritha Bateman    MRN: [id number]           Patient Information     Date Of Birth          1990        Visit Information        Provider Department      1/16/2018 3:40 PM Yandy Presley APRN CNM [facility]        Today's Diagnoses     Supervision of high risk pregnancy in third trimester    -  1    Anemia complicating pregnancy in third trimester        36 weeks gestation of pregnancy          Care Instructions    NATURAL LABOR PREPARATION    So, you're getting closer and closer to your due date and wondering what you can do to help get your body ready for labor.   Here are some natural methods you can try:    NOTE: DO NOT begin any of the following prior to 36 completed weeks of pregnancy!    Evening Primrose Oil: Take 1000mg by mouth three times per day. This encourages the cervix to ripen. Cervical ripening is when your cervix becomes more soft and stretchy to get ready for dilation and effacement.     Red Raspberry Leaf Tea: Red raspberry tea (get it at a Co-op or in the grocery store). Drink three cups per day (hot or cold). This can help to prepare the uterine muscles for labor.            Follow-ups after your visit        Follow-up notes from your care team     Return in about 1 week (around 1/23/2018) for Prenatal Visit.      Your next 10 appointments already scheduled     Jan 22, 2018 10:40 AM CST   ESTABLISHED PRENATAL with ABIMAEL Yepez CNM   [facility] ([facility])    [address] [place] [id number] [phone]              Who to contact     If you have questions or need follow up information about today's clinic visit or your schedule please contact [facility] directly at [phone].  Normal or non-critical lab and imaging results will be communicated to you by MyChart, letter or phone within 4 business days after  the clinic has received the results. If you do not hear from us within 7 days, please contact the clinic through "[employer] or phone. If you have a critical or abnormal lab result, we will notify you by phone as soon as possible.  Submit refill requests through "[employer] or call your pharmacy and they will forward the refill request to us. Please allow 3 business days for your refill to be completed.          Additional Information About Your Visit        [employer] Information     "[employer] gives you secure access to your electronic health record. If you see a primary care provider, you can also send messages to your care team and make appointments. If you have questions, please call your primary care clinic.  If you do not have a primary care provider, please call [phone] and they will assist you.        Care EveryWhere ID     This is your Care EveryWhere ID. This could be used by other organizations to access your [facility] medical records  [id number]        Your Vitals Were     Last Period BMI (Body Mass Index)                05/04/2017 30.95 kg/m2           Blood Pressure from Last 3 Encounters:   01/16/18 112/74   01/09/18 112/78   12/26/17 118/80    Weight from Last 3 Encounters:   01/16/18 186 lb (84.4 kg)   01/09/18 180 lb 3.2 oz (81.7 kg)   12/26/17 177 lb (80.3 kg)              Today, you had the following     No orders found for display       Primary Care Provider Fax #    Physician No Ref-Primary [phone]       No address on file        Equal Access to Services     ADEOLA IBRAHIM : Hadii rupinder Hamilton, waaxda luqadaha, qaybta kaalmada rafada, cayetano snyder. So [facility] [place] [phone].    ATENCIÓN: Si habla español, tiene a mishra disposición servicios gratuitos de asistencia lingüística. Llame al [phone].    We comply with applicable federal civil rights laws and [place] laws. We do not discriminate on the basis of race, color, national origin, age, disability, sex,  sexual orientation, or gender identity.            Thank you!     Thank you for choosing [facility]  for your care. Our goal is always to provide you with excellent care. Hearing back from our patients is one way we can continue to improve our services. Please take a few minutes to complete the written survey that you may receive in the mail after your visit with us. Thank you!             Your Updated Medication List - Protect others around you: Learn how to safely use, store and throw away your medicines at www.disposemymeds.org.          This list is accurate as of: 1/16/18  4:17 PM.  Always use your most recent med list.                   Brand Name Dispense Instructions for use Diagnosis    IRON SUPPLEMENT PO      Take by mouth daily (with breakfast)        * order for DME     1 Device    Equipment being ordered: One pregnancy support belt for low back pain in pregnancy.    Low back pain during pregnancy in third trimester       * order for DME     1 Device    Equipment being ordered: One standard double electric breast pump with accessories, to be used for 99 years    Lactating mother, Breastfeeding (infant)       PRENATAL PLUS 27-1 MG Tabs     100 tablet    Take 1 tablet by mouth daily    Supervision of high risk pregnancy in third trimester, 33 weeks gestation of pregnancy       VITAMIN B 12 PO      Take by mouth daily        VITAMIN C PO      Take by mouth daily        * Notice:  This list has 2 medication(s) that are the same as other medications prescribed for you. Read the directions carefully, and ask your doctor or other care provider to review them with you.

## 2018-01-16 NOTE — PROGRESS NOTES
Feels okay. Here today with Leoncio.   Having lower back/hip pain. Support belt not helping. Recommended/demonstated some stretches. Recommended chiropractor.  Fetal movement: positive  Denies vaginal bleeding/lof, a few contractions  Warning signs reviewed  Labor signs and symptoms discussed, aware of numbers to call  Denies s/sx of preeclampsia and aware of what to report  Plans for birth control after baby: undecided.  Return to clinic 1 week    Yandy Presley, SUZETTE, APRN, CNM

## 2018-01-18 ENCOUNTER — HOSPITAL ENCOUNTER (EMERGENCY)
Facility: CLINIC | Age: 28
Discharge: HOME | End: 2018-01-18
Attending: EMERGENCY MEDICINE | Admitting: EMERGENCY MEDICINE
Payer: COMMERCIAL

## 2018-01-18 ENCOUNTER — TELEPHONE (OUTPATIENT)
Dept: NURSING | Facility: CLINIC | Age: 28
End: 2018-01-18

## 2018-01-18 ENCOUNTER — APPOINTMENT (OUTPATIENT)
Dept: ULTRASOUND IMAGING | Facility: CLINIC | Age: 28
End: 2018-01-18
Attending: EMERGENCY MEDICINE
Payer: COMMERCIAL

## 2018-01-18 VITALS
TEMPERATURE: 98.4 F | HEART RATE: 86 BPM | RESPIRATION RATE: 22 BRPM | SYSTOLIC BLOOD PRESSURE: 128 MMHG | OXYGEN SATURATION: 99 % | DIASTOLIC BLOOD PRESSURE: 72 MMHG | BODY MASS INDEX: 31.58 KG/M2 | HEIGHT: 64 IN | WEIGHT: 185 LBS

## 2018-01-18 DIAGNOSIS — M79.89 RIGHT LEG SWELLING: ICD-10-CM

## 2018-01-18 PROCEDURE — 99284 EMERGENCY DEPT VISIT MOD MDM: CPT | Mod: 25

## 2018-01-18 PROCEDURE — 93971 EXTREMITY STUDY: CPT | Mod: RT

## 2018-01-18 ASSESSMENT — ENCOUNTER SYMPTOMS
SHORTNESS OF BREATH: 0
ARTHRALGIAS: 1
FEVER: 0

## 2018-01-18 NOTE — TELEPHONE ENCOUNTER
Pt 37w0d.  Pt stated her right ankle is swollen and it hurts to walk on. Pt states the swelling is just in her ankle, does not go into shin or into feet. Pt states she did not injur it.  Pt denies bruising.   Pt denies headaches, upper stomach pain, vision changes, chest pain, SOB.   Pt will be there in 1 hour.   On call CNM informed. Called [facility] who stated to rule out DVT pt should go to ER and they can come downstairs to run a strip on the pt.   Pt informed to go to ER instead, pt stated understanding, also informed on call CNM.

## 2018-01-18 NOTE — ED AVS SNAPSHOT
Emergency Department    [address] [facility] [address]    Phone:  [phone]    Fax:  [phone]                                       Haritha Bateman   MRN: [id number]    Department:   Emergency Department   Date of Visit:  1/18/2018           Patient Information     Date Of Birth          1990        Your diagnoses for this visit were:     Right leg swelling        You were seen by Zeynep Eastman MD.      Follow-up Information     Follow up with Follow-up with your OB for further eval as needed.        Follow up with  Emergency Department.    Specialty:  EMERGENCY MEDICINE    Why:  As needed, If symptoms worsen    Contact information:    [address] [facility] [address] [phone]        Discharge Instructions         Leg Swelling in a Single Leg  Swelling of the arms, feet, ankles, and legs is called edema. It is caused by extra fluid collecting in the tissues. Because of gravity, extra fluid in the body settles to the lowest part. That is why the legs and feet are most affected. You have swelling in a single leg.  Some of the causes for swelling in only a single leg include:    Infection in the foot or leg    Long-term problem with a vein not working well (venous insufficiency)    Swollen, twisted vein in the leg (varicose veins)    Insect bite or sting on the foot or leg    Injury or recent surgery on the foot or leg    Blood clot in a deep vein of the leg (deep vein thrombosis or DVT)    Inflammation of the joints of the lower leg  Medical treatment will depend on what is causing your swelling.  Home care  Follow these guidelines when caring for yourself at home:    Don t wear tight clothing.    Keep your legs up while lying or sitting.    Take any medicines as directed.    If infection, injury, or recent surgery is the cause of your swelling, stay off your legs as much as possible until your symptoms get better.    If you have venous  insufficiency or varicose veins, don t sit or  one place for long periods of time. Take breaks and walk around every few hours. Talk with your healthcare provider about wearing support stockings to help lessen swelling during the day.    Wear compression stockings with your doctor's approval  Follow-up care  Follow up with your healthcare provider as advised.  Call 911  Call 911 if any of these occur:    Shortness of breath or trouble breathing    Chest pain    Coughing up blood    Fainting or loss of consciousness   When to seek medical advice  Call your healthcare provider right away if any of these occur:    Increased pain, swelling, warmth, or redness of the leg, ankle, or foot    Fever of 100.4 F (38 C) or higher, or as directed by your healthcare provider    Weakness or dizziness    Shaking chills    Drenching sweats  Date Last Reviewed: 4/11/2016 2000-2017 [employer]. [address] [place] [address]. All rights reserved. This information is not intended as a substitute for professional medical care. Always follow your healthcare professional's instructions.          Discharge References/Attachments     LEG SWELLING IN A SINGLE LEG (ENGLISH)      Future Appointments        Provider Department Dept Phone Center    1/22/2018 10:40 AM ABIMAEL Rosen PROMISE [facility] [phone] [facility]      24 Hour Appointment Hotline       To make an appointment at any [facility], call [phone] ([phone]). If you don't have a family doctor or clinic, we will help you find one. [facility] are conveniently located to serve the needs of you and your family.             Review of your medicines      Our records show that you are taking the medicines listed below. If these are incorrect, please call your family doctor or clinic.        Dose / Directions Last dose taken    IRON SUPPLEMENT PO        Take by mouth daily (with breakfast)    Refills:  0        * order for DME   Quantity:  1 Device        Equipment being ordered: One pregnancy support belt for low back pain in pregnancy.   Refills:  0        * order for DME   Quantity:  1 Device        Equipment being ordered: One standard double electric breast pump with accessories, to be used for 99 years   Refills:  0        PRENATAL PLUS 27-1 MG Tabs   Dose:  1 tablet   Quantity:  100 tablet        Take 1 tablet by mouth daily   Refills:  3        VITAMIN B 12 PO        Take by mouth daily   Refills:  0        VITAMIN C PO        Take by mouth daily   Refills:  0        * Notice:  This list has 2 medication(s) that are the same as other medications prescribed for you. Read the directions carefully, and ask your doctor or other care provider to review them with you.            Procedures and tests performed during your visit     US Lower Extremity Venous Duplex Right      Orders Needing Specimen Collection     None      Pending Results     No orders found from 1/16/2018 to 1/19/2018.            Pending Culture Results     No orders found from 1/16/2018 to 1/19/2018.            Pending Results Instructions     If you had any lab results that were not finalized at the time of your Discharge, you can call the ED Lab Result RN at [phone]. You will be contacted by this team for any positive Lab results or changes in treatment. The nurses are available 7 days a week from 10A to 6:30P.  You can leave a message 24 hours per day and they will return your call.        Test Results From Your Hospital Stay        1/18/2018  9:01 PM      Narrative     US LOWER EXTREMITY VENOUS DUPLEX RIGHT   1/18/2018 8:54 PM     HISTORY: Right lower extremity pain during pregnancy.    COMPARISON: None.    TECHNIQUE: Spectral doppler and waveform analysis were performed on  the right lower extremity veins.    FINDINGS: The right common femoral, femoral, and popliteal veins are  patent, compressible, and negative for deep  venous thrombosis. Calf  veins are segmentally seen but appear negative for DVT where  visualized.        Impression     IMPRESSION:  No evidence for deep venous thrombosis in the right lower  extremity veins.    MARY GALLEGO MD                Clinical Quality Measure: Blood Pressure Screening     Your blood pressure was checked while you were in the emergency department today. The last reading we obtained was  BP: 128/72 . Please read the guidelines below about what these numbers mean and what you should do about them.  If your systolic blood pressure (the top number) is less than 120 and your diastolic blood pressure (the bottom number) is less than 80, then your blood pressure is normal. There is nothing more that you need to do about it.  If your systolic blood pressure (the top number) is 120-139 or your diastolic blood pressure (the bottom number) is 80-89, your blood pressure may be higher than it should be. You should have your blood pressure rechecked within a year by a primary care provider.  If your systolic blood pressure (the top number) is 140 or greater or your diastolic blood pressure (the bottom number) is 90 or greater, you may have high blood pressure. High blood pressure is treatable, but if left untreated over time it can put you at risk for heart attack, stroke, or kidney failure. You should have your blood pressure rechecked by a primary care provider within the next 4 weeks.  If your provider in the emergency department today gave you specific instructions to follow-up with your doctor or provider even sooner than that, you should follow that instruction and not wait for up to 4 weeks for your follow-up visit.        Thank you for choosing [facility]       Thank you for choosing [facility] for your care. Our goal is always to provide you with excellent care. Hearing back from our patients is one way we can continue to improve our services. Please take a few minutes to complete the written  survey that you may receive in the mail after you visit with us. Thank you!        [email or web address] Information     [employer] gives you secure access to your electronic health record. If you see a primary care provider, you can also send messages to your care team and make appointments. If you have questions, please call your primary care clinic.  If you do not have a primary care provider, please call [phone] and they will assist you.        Care EveryWhere ID     This is your Care EveryWhere ID. This could be used by other organizations to access your [facility] medical records  [id number]        Equal Access to Services     [facility] : Nieves Hamilton, falguni smith, deb he, cayetano amador . So [facility] [phone].    ATENCIÓN: Si habla español, tiene a mishra disposición servicios gratuitos de asistencia lingüística. Sherin al [phone].    We comply with applicable federal civil rights laws and [place] laws. We do not discriminate on the basis of race, color, national origin, age, disability, sex, sexual orientation, or gender identity.            After Visit Summary       This is your record. Keep this with you and show to your community pharmacist(s) and doctor(s) at your next visit.

## 2018-01-18 NOTE — ED AVS SNAPSHOT
Emergency Department    [address] [place] [address]    Phone:  [phone]    Fax:  [phone]                                       Haritha Bateman   MRN: [id number]    Department:   Emergency Department   Date of Visit:  1/18/2018           After Visit Summary Signature Page     I have received my discharge instructions, and my questions have been answered. I have discussed any challenges I see with this plan with the nurse or doctor.    ..........................................................................................................................................  Patient/Patient Representative Signature      ..........................................................................................................................................  Patient Representative Print Name and Relationship to Patient    ..................................................               ................................................  Date                                            Time    ..........................................................................................................................................  Reviewed by Signature/Title    ...................................................              ..............................................  Date                                                            Time

## 2018-01-19 NOTE — DISCHARGE INSTRUCTIONS
Leg Swelling in a Single Leg  Swelling of the arms, feet, ankles, and legs is called edema. It is caused by extra fluid collecting in the tissues. Because of gravity, extra fluid in the body settles to the lowest part. That is why the legs and feet are most affected. You have swelling in a single leg.  Some of the causes for swelling in only a single leg include:    Infection in the foot or leg    Long-term problem with a vein not working well (venous insufficiency)    Swollen, twisted vein in the leg (varicose veins)    Insect bite or sting on the foot or leg    Injury or recent surgery on the foot or leg    Blood clot in a deep vein of the leg (deep vein thrombosis or DVT)    Inflammation of the joints of the lower leg  Medical treatment will depend on what is causing your swelling.  Home care  Follow these guidelines when caring for yourself at home:    Don t wear tight clothing.    Keep your legs up while lying or sitting.    Take any medicines as directed.    If infection, injury, or recent surgery is the cause of your swelling, stay off your legs as much as possible until your symptoms get better.    If you have venous insufficiency or varicose veins, don t sit or  one place for long periods of time. Take breaks and walk around every few hours. Talk with your healthcare provider about wearing support stockings to help lessen swelling during the day.    Wear compression stockings with your doctor's approval  Follow-up care  Follow up with your healthcare provider as advised.  Call 911  Call 911 if any of these occur:    Shortness of breath or trouble breathing    Chest pain    Coughing up blood    Fainting or loss of consciousness   When to seek medical advice  Call your healthcare provider right away if any of these occur:    Increased pain, swelling, warmth, or redness of the leg, ankle, or foot    Fever of 100.4 F (38 C) or higher, or as directed by your healthcare provider    Weakness or  dizziness    Shaking chills    Drenching sweats  Date Last Reviewed: 4/11/2016 2000-2017 [employer]. [address] [place] [address], [place] [address]. All rights reserved. This information is not intended as a substitute for professional medical care. Always follow your healthcare professional's instructions.

## 2018-01-19 NOTE — ED PROVIDER NOTES
"  History     Chief Complaint:  Ankle Pain     HPI   Haritha Bateman is a 37 week pregnant 27 year old female who presents to the emergency department today for evaluation of right ankle pain. The patient reports medial right ankle pain. She was seen at clinic earlier tonight and the patient was sent here for an US on ankle/leg to rule out DVT prompting her visit to the emergency department. She denies trauma, fever, injury, shortness of breath, and chest pain. Of note, she is supposed to go to her maternal assessment after her emergency department visit.     Allergies:  No Known Drug Allergies     Medications:    Cyanocobalamin  Ferrous sulfate     Past Medical History:    Past medical history reviewed. No pertinent past medical history.     Past Surgical History:    Surgical history reviewed. No pertinent surgical history.     Family History:    Mother: Hypertension  Paternal Grandmother: Hypertension, Cancer    Social History:  The patient was accompanied to the ED by .  Smoking Status: Never Smoker  Smokeless Tobacco: Never Used  Alcohol Use: Negative   Marital Status:       Review of Systems   Constitutional: Negative for fever.   Respiratory: Negative for shortness of breath.    Cardiovascular: Negative for chest pain.   Musculoskeletal: Positive for arthralgias (right ankle).   All other systems reviewed and are negative.      Physical Exam     Patient Vitals for the past 24 hrs:   BP Temp Temp src Pulse Resp SpO2 Height Weight   01/18/18 1817 128/72 98.4  F (36.9  C) Temporal 86 22 99 % 1.626 m (5' 4\") 83.9 kg (185 lb)       Physical Exam  General/Appearance: appears stated age, well-groomed, appears comfortable  Eyes: EOMI, no scleral injection, no icterus  ENT: MMM  Neck: supple, nl ROM, no stiffness  Cardiovascular: RRR, nl S1S2, no m/r/g, 2+ pulses in all 4 extremities, cap refill <2sec  Respiratory: CTAB, good air movement throughout, no wheezes/rhonchi/rales, no increased WOB, no " retractions  Back: no lesions  GI: abd gravid  MSK: GEE, good tone, no bony abnormality, discomfort to palp diffusely of R medial ankle  Skin: warm and well-perfused, no rash, trace edema to distal RLE and ankle, no ecchymosis, nl turgor, no erythema/warmth over R ankle  Neuro: GCS 15, alert and oriented, no gross focal neuro deficits  Psych: interacts appropriately  Heme: no petechia, no purpura, no active bleeding        Emergency Department Course   Imaging:  Radiology findings were communicated with the patient and family who voiced understanding of the findings.  US Lower extremity venous duplex right  IMPRESSION:  No evidence for deep venous thrombosis in the right lower  extremity veins.  Report per radiology     Emergency Department Course:  Nursing notes and vitals reviewed.  The patient was sent for a US Lower extremity venous duplex right while in the emergency department, results above.   2100: I performed an exam of the patient as documented above.   2113: Patient rechecked and updated.   Findings and plan explained to the Patient and spouse. Patient discharged home with instructions regarding supportive care, medications, and reasons to return. The importance of close follow-up was reviewed.  I personally reviewed the imaging results with the Patient and spouse and answered all related questions prior to discharge.        Impression & Plan      Medical Decision Making:  Haritha Bateman is a 27 year old female who presents to the emergency department today for evaluation of right lower extremity edema.  She is 37 weeks pregnant and had a provider who is appropriately concerned for DVT, thus sending her here for ultrasound.  Ultrasound thankfully was negative.  We discussed the possible false negative is 1 there is just distal clot therefore she knows that she is continuing to have swelling in symptoms in 7 days she should have a repeat ultrasound done.  Otherwise there is no other signs or symptoms to  suggest inflammation, infection, traumatic injury.    Diagnosis:    ICD-10-CM    1. Right leg swelling M79.89        Disposition:   Discharged to home    Scribe Disclosure:  I, Kameron Crawford, am serving as a scribe at 8:14 PM on 1/18/2018 to document services personally performed by Zeynep Eastman MD based on my observations and the provider's statements to me.      EMERGENCY DEPARTMENT       Zeynep Eastman MD  01/18/18 2019

## 2018-01-22 ENCOUNTER — PRENATAL OFFICE VISIT (OUTPATIENT)
Dept: MIDWIFE SERVICES | Facility: CLINIC | Age: 28
End: 2018-01-22
Payer: COMMERCIAL

## 2018-01-22 VITALS — BODY MASS INDEX: 31.96 KG/M2 | DIASTOLIC BLOOD PRESSURE: 80 MMHG | SYSTOLIC BLOOD PRESSURE: 118 MMHG | WEIGHT: 186.2 LBS

## 2018-01-22 DIAGNOSIS — H43.393 FLOATERS IN VISUAL FIELD, BILATERAL: ICD-10-CM

## 2018-01-22 DIAGNOSIS — Z3A.37 37 WEEKS GESTATION OF PREGNANCY: ICD-10-CM

## 2018-01-22 DIAGNOSIS — G44.52 NEW DAILY PERSISTENT HEADACHE: ICD-10-CM

## 2018-01-22 DIAGNOSIS — R60.0 EDEMA OF RIGHT LOWER EXTREMITY: ICD-10-CM

## 2018-01-22 DIAGNOSIS — O09.93 SUPERVISION OF HIGH RISK PREGNANCY IN THIRD TRIMESTER: Primary | ICD-10-CM

## 2018-01-22 LAB
CREAT UR-MCNC: 142 MG/DL
ERYTHROCYTE [DISTWIDTH] IN BLOOD BY AUTOMATED COUNT: 16.6 % (ref 10–15)
HCT VFR BLD AUTO: 36.3 % (ref 35–47)
HGB BLD-MCNC: 12.5 G/DL (ref 11.7–15.7)
MCH RBC QN AUTO: 30.7 PG (ref 26.5–33)
MCHC RBC AUTO-ENTMCNC: 34.4 G/DL (ref 31.5–36.5)
MCV RBC AUTO: 89 FL (ref 78–100)
PLATELET # BLD AUTO: 176 10E9/L (ref 150–450)
PROT UR-MCNC: 0.23 G/L
PROT/CREAT 24H UR: 0.16 G/G CR (ref 0–0.2)
RBC # BLD AUTO: 4.07 10E12/L (ref 3.8–5.2)
WBC # BLD AUTO: 7.5 10E9/L (ref 4–11)

## 2018-01-22 PROCEDURE — 36415 COLL VENOUS BLD VENIPUNCTURE: CPT | Performed by: ADVANCED PRACTICE MIDWIFE

## 2018-01-22 PROCEDURE — 80053 COMPREHEN METABOLIC PANEL: CPT | Performed by: ADVANCED PRACTICE MIDWIFE

## 2018-01-22 PROCEDURE — 84156 ASSAY OF PROTEIN URINE: CPT | Performed by: ADVANCED PRACTICE MIDWIFE

## 2018-01-22 PROCEDURE — 99207 ZZC PRENATAL VISIT: CPT | Performed by: ADVANCED PRACTICE MIDWIFE

## 2018-01-22 PROCEDURE — 85027 COMPLETE CBC AUTOMATED: CPT | Performed by: ADVANCED PRACTICE MIDWIFE

## 2018-01-22 NOTE — PROGRESS NOTES
"Feels okay overall  Fetal movement: positive     Constant headache since Saturday. Taking Tylenol q4 hours. Tylenol relieves headache but does not get rid of it. Feels constant pressure.   Seeing occasional \"bacteria flying around\" since headache started.  Denies RUQ pain.   BP WNL.  PIH labs drawn today. Will call only if labs are abnormal.    Edema in right ankle. Evaluated last week for blood clot - negative.  Pain noted in lower right ankle. 1+ edema in only right ankle.   Additional doppler ordered.    Has SOB. SOB is persistent. Lungs clear bilaterally. No pain or tightness.  Reviewed warning s/sx and when to go to ED. Client is aware and agreeable to plan of care.      Denies vaginal bleeding/lof, no contractions  Cx: Ft/ 30%/ -2/ midposition/ medium/firm consistency   Warning signs reviewed   Labor signs and symptoms discussed, aware of numbers to call  Denies s/sx of preeclampsia and aware of what to report  Return to clinic 1 week    Yandy Presley, DNP, APRN, CNM    "

## 2018-01-22 NOTE — MR AVS SNAPSHOT
After Visit Summary   1/22/2018    Haritha Bateman    MRN: [id number]           Patient Information     Date Of Birth          1990        Visit Information        Provider Department      1/22/2018 10:40 AM Yandy Presley APRN CNM [facility]        Today's Diagnoses     Supervision of high risk pregnancy in third trimester    -  1    Edema of right lower extremity        New daily persistent headache        Floaters in visual field, bilateral        37 weeks gestation of pregnancy           Follow-ups after your visit        Follow-up notes from your care team     Return in about 1 week (around 1/29/2018) for Prenatal Visit.      Your next 10 appointments already scheduled     Jan 29, 2018  9:00 AM CST   ESTABLISHED PRENATAL with ABIMAEL Willett CNM   [facility] [place] [facility] ([facility])    [address] [place] [id number]   [phone]              Future tests that were ordered for you today     Open Future Orders        Priority Expected Expires Ordered    US Lower Extremity Venous Duplex Bilateral Routine  1/22/2019 1/22/2018            Who to contact     If you have questions or need follow up information about today's clinic visit or your schedule please contact [facility] directly at [phone].  Normal or non-critical lab and imaging results will be communicated to you by MyChart, letter or phone within 4 business days after the clinic has received the results. If you do not hear from us within 7 days, please contact the clinic through MyChart or phone. If you have a critical or abnormal lab result, we will notify you by phone as soon as possible.  Submit refill requests through [employer] or call your pharmacy and they will forward the refill request to us. Please allow 3 business days for your refill to be completed.          Additional Information About Your Visit         [employer] Information     [employer] gives you secure access to your electronic health record. If you see a primary care provider, you can also send messages to your care team and make appointments. If you have questions, please call your primary care clinic.  If you do not have a primary care provider, please call [phone] and they will assist you.        Care EveryWhere ID     This is your Care EveryWhere ID. This could be used by other organizations to access your [facility] medical records  [id number]        Your Vitals Were     Last Period BMI (Body Mass Index)                05/04/2017 31.96 kg/m2           Blood Pressure from Last 3 Encounters:   01/22/18 118/80   01/18/18 128/72   01/16/18 112/74    Weight from Last 3 Encounters:   01/22/18 186 lb 3.2 oz (84.5 kg)   01/18/18 185 lb (83.9 kg)   01/16/18 186 lb (84.4 kg)              We Performed the Following     CBC with platelets     Comprehensive metabolic panel     Creatinine random urine     Protein  random urine with Creat Ratio        Primary Care Provider Fax #    Physician No Ref-Primary [phone]       No address on file        Equal Access to Services     ADEOLA IBRAHIM : Hadii rupinder Hamilton, falguni smith, deb he, cayetano amador . So [facility] [phone].    ATENCIÓN: Si habla español, tiene a mishra disposición servicios gratChinle Comprehensive Health Care Facilityos de asistencia lingüística. Llame al [phone].    We comply with applicable federal civil rights laws and [place] laws. We do not discriminate on the basis of race, color, national origin, age, disability, sex, sexual orientation, or gender identity.            Thank you!     Thank you for choosing [facility]  for your care. Our goal is always to provide you with excellent care. Hearing back from our patients is one way we can continue to improve our services. Please take a few minutes to complete the written survey that you may receive in the  mail after your visit with us. Thank you!             Your Updated Medication List - Protect others around you: Learn how to safely use, store and throw away your medicines at www.disposemymeds.org.          This list is accurate as of: 1/22/18 11:24 AM.  Always use your most recent med list.                   Brand Name Dispense Instructions for use Diagnosis    IRON SUPPLEMENT PO      Take by mouth daily (with breakfast)        * order for DME     1 Device    Equipment being ordered: One pregnancy support belt for low back pain in pregnancy.    Low back pain during pregnancy in third trimester       * order for DME     1 Device    Equipment being ordered: One standard double electric breast pump with accessories, to be used for 99 years    Lactating mother, Breastfeeding (infant)       PRENATAL PLUS 27-1 MG Tabs     100 tablet    Take 1 tablet by mouth daily    Supervision of high risk pregnancy in third trimester, 33 weeks gestation of pregnancy       VITAMIN B 12 PO      Take by mouth daily        VITAMIN C PO      Take by mouth daily        * Notice:  This list has 2 medication(s) that are the same as other medications prescribed for you. Read the directions carefully, and ask your doctor or other care provider to review them with you.

## 2018-01-23 LAB
ALBUMIN SERPL-MCNC: 2.8 G/DL (ref 3.4–5)
ALP SERPL-CCNC: 167 U/L (ref 40–150)
ALT SERPL W P-5'-P-CCNC: 21 U/L (ref 0–50)
ANION GAP SERPL CALCULATED.3IONS-SCNC: 10 MMOL/L (ref 3–14)
AST SERPL W P-5'-P-CCNC: 20 U/L (ref 0–45)
BILIRUB SERPL-MCNC: 0.1 MG/DL (ref 0.2–1.3)
BUN SERPL-MCNC: 11 MG/DL (ref 7–30)
CALCIUM SERPL-MCNC: 8.6 MG/DL (ref 8.5–10.1)
CHLORIDE SERPL-SCNC: 109 MMOL/L (ref 94–109)
CO2 SERPL-SCNC: 19 MMOL/L (ref 20–32)
CREAT SERPL-MCNC: 0.41 MG/DL (ref 0.52–1.04)
GFR SERPL CREATININE-BSD FRML MDRD: >90 ML/MIN/1.7M2
GLUCOSE SERPL-MCNC: 94 MG/DL (ref 70–99)
POTASSIUM SERPL-SCNC: 3.6 MMOL/L (ref 3.4–5.3)
PROT SERPL-MCNC: 6.5 G/DL (ref 6.8–8.8)
SODIUM SERPL-SCNC: 138 MMOL/L (ref 133–144)

## 2018-01-31 ENCOUNTER — PRENATAL OFFICE VISIT (OUTPATIENT)
Dept: MIDWIFE SERVICES | Facility: CLINIC | Age: 28
End: 2018-01-31
Payer: COMMERCIAL

## 2018-01-31 VITALS — WEIGHT: 185 LBS | SYSTOLIC BLOOD PRESSURE: 110 MMHG | BODY MASS INDEX: 31.76 KG/M2 | DIASTOLIC BLOOD PRESSURE: 70 MMHG

## 2018-01-31 DIAGNOSIS — Z34.03 SUPERVISION OF NORMAL FIRST PREGNANCY IN THIRD TRIMESTER: Primary | ICD-10-CM

## 2018-01-31 PROCEDURE — 99207 ZZC PRENATAL VISIT: CPT | Performed by: ADVANCED PRACTICE MIDWIFE

## 2018-01-31 NOTE — PROGRESS NOTES
Feels well overall. Here alone today.  Fetal movement: positive  Denies vaginal bleeding/lof, no contractions.  Warning signs reviewed   Cx: ft/ 50%/ -2/ posterior  Labor signs and symptoms discussed  Denies s/sx of preeclampsia and aware of what to report  Return to clinic 1 week    Yandy Presley, SUZETTE, APRN, CNM

## 2018-01-31 NOTE — MR AVS SNAPSHOT
After Visit Summary   1/31/2018    Haritha Bateman    MRN: [id number]           Patient Information     Date Of Birth          1990        Visit Information        Provider Department      1/31/2018 3:30 PM Yandy Presley APRN CNM [facility]        Today's Diagnoses     Supervision of normal first pregnancy in third trimester    -  1       Follow-ups after your visit        Follow-up notes from your care team     Return in about 1 week (around 2/7/2018) for Prenatal Visit.      Who to contact     If you have questions or need follow up information about today's clinic visit or your schedule please contact [facility] directly at [phone].  Normal or non-critical lab and imaging results will be communicated to you by [employer], letter or phone within 4 business days after the clinic has received the results. If you do not hear from us within 7 days, please contact the clinic through [employer] or phone. If you have a critical or abnormal lab result, we will notify you by phone as soon as possible.  Submit refill requests through [employer] or call your pharmacy and they will forward the refill request to us. Please allow 3 business days for your refill to be completed.          Additional Information About Your Visit        MyChart Information     [employer] gives you secure access to your electronic health record. If you see a primary care provider, you can also send messages to your care team and make appointments. If you have questions, please call your primary care clinic.  If you do not have a primary care provider, please call [phone] and they will assist you.        Care EveryWhere ID     This is your Care EveryWhere ID. This could be used by other organizations to access your [facility] medical records  [id number]        Your Vitals Were     Last Period BMI (Body Mass Index)                05/04/2017 31.76 kg/m2           Blood Pressure from Last  3 Encounters:   01/31/18 110/70   01/22/18 118/80   01/18/18 128/72    Weight from Last 3 Encounters:   01/31/18 185 lb (83.9 kg)   01/22/18 186 lb 3.2 oz (84.5 kg)   01/18/18 185 lb (83.9 kg)              Today, you had the following     No orders found for display       Primary Care Provider Fax #    Physician No Ref-Primary [phone]       No address on file        Equal Access to Services     ADEOLA IBRAHIM : Hadii aad ku hadasho Soomaali, waaxda luqadaha, qaybta kaalmada adeegyada, waxay katiein haydishan teresa amador . So [facility] [phone].    ATENCIÓN: Si habla español, tiene a mishra disposición servicios gratuitos de asistencia lingüística. Llame al [phone].    We comply with applicable federal civil rights laws and [place] laws. We do not discriminate on the basis of race, color, national origin, age, disability, sex, sexual orientation, or gender identity.            Thank you!     Thank you for choosing [facility]  for your care. Our goal is always to provide you with excellent care. Hearing back from our patients is one way we can continue to improve our services. Please take a few minutes to complete the written survey that you may receive in the mail after your visit with us. Thank you!             Your Updated Medication List - Protect others around you: Learn how to safely use, store and throw away your medicines at www.disposemymeds.org.          This list is accurate as of 1/31/18  3:55 PM.  Always use your most recent med list.                   Brand Name Dispense Instructions for use Diagnosis    IRON SUPPLEMENT PO      Take by mouth daily (with breakfast)        * order for DME     1 Device    Equipment being ordered: One pregnancy support belt for low back pain in pregnancy.    Low back pain during pregnancy in third trimester       * order for DME     1 Device    Equipment being ordered: One standard double electric breast pump with accessories, to be used for 99  years    Lactating mother, Breastfeeding (infant)       PRENATAL PLUS 27-1 MG Tabs     100 tablet    Take 1 tablet by mouth daily    Supervision of high risk pregnancy in third trimester, 33 weeks gestation of pregnancy       VITAMIN B 12 PO      Take by mouth daily        VITAMIN C PO      Take by mouth daily        * Notice:  This list has 2 medication(s) that are the same as other medications prescribed for you. Read the directions carefully, and ask your doctor or other care provider to review them with you.

## 2018-02-05 ENCOUNTER — PRENATAL OFFICE VISIT (OUTPATIENT)
Dept: MIDWIFE SERVICES | Facility: CLINIC | Age: 28
End: 2018-02-05
Payer: COMMERCIAL

## 2018-02-05 VITALS — BODY MASS INDEX: 32.44 KG/M2 | DIASTOLIC BLOOD PRESSURE: 82 MMHG | SYSTOLIC BLOOD PRESSURE: 124 MMHG | WEIGHT: 189 LBS

## 2018-02-05 DIAGNOSIS — O99.013 ANEMIA COMPLICATING PREGNANCY IN THIRD TRIMESTER: ICD-10-CM

## 2018-02-05 DIAGNOSIS — O09.93 SUPERVISION OF HIGH RISK PREGNANCY IN THIRD TRIMESTER: Primary | ICD-10-CM

## 2018-02-05 DIAGNOSIS — Z3A.39 39 WEEKS GESTATION OF PREGNANCY: ICD-10-CM

## 2018-02-05 PROCEDURE — 99207 ZZC PRENATAL VISIT: CPT | Performed by: ADVANCED PRACTICE MIDWIFE

## 2018-02-05 NOTE — PATIENT INSTRUCTIONS
Fetal Kick Counts    It is important to know when your baby's movements occur. We often get busy with work and life and do not pay close attention to their movements.        Women typically begin feeling movement between 18-22 weeks of gestation, sometimes it can be earlier or later depending on where your placenta is       Movements usually begin feeling like popping or fluttering and as the baby grows they become more pronounced    Toward the end of pregnancy as the baby gets larger they may not move as much or make as big of movements. Babies have maturing sleep cycles as well as not as much room to move and flip. If you are ever concerned about your baby's movements or have not felt the baby move for a while, we recommend you do a fetal kick count. Prior to starting your count drink a glass of water or juice and eat a snack. Then lay down on your side and begin to count movements.     How to do a Fetal Kick Counts    There are many different ways to monitor your baby's movements. Movements can range from large jabs to small kicks, or wiggles.  Hiccups count!      Count 10 movements in 2 hours when resting and focusing    Count 10 movements in 12 hours when doing normal activity    We recommend that if movements occur but seem decreased that you should be seen in the clinic or hospital for evaluation within 12 hours. If fetal movement is absent or fetal kick counts are low please contact us right away.    If you ever have any concerns about your baby's movements DO NOT HESITATE to call us, we are here for you!    [facility]  [phone]        Labor Instructions for Midwife Patients    When to call:  Both during and after office hours call  [phone]. There is a triage RN to take your calls and answer your questions 24 hours a day.  If she cannot answer your question she will page the midwife on call for you.    When to call:  Call anytime you have important concerns about you or your baby.  "    Call if:    You are having contractions at regular intervals about 5-6 minutes apart lasting 30-60 seconds and becoming increasingly more intense     You have an uncontrollable gush of fluid from your vagina or feel a pop and gush like your water has broken    You have HEAVY bleeding, like heavy period, blood running down your legs, or  soaking a pad.     Some bleeding after a pelvic exam, after intercourse, or in labor when your cervix is dilating is normal and is referred to as \"bloody show\"    You have severe, continuous back or abdominal pain    You feel it is time to go to the hospital    If this is your first labor, call when contractions are very intense and have been about every 3-4 minutes for about an hour    If it is your second labor or more, call when contractions are strong and about every 3-5 minutes or sooner depending on your level of discomfort.     Keep in mind we are always here for you! If you have questions, concerns please don't hesitate to call us.     What to eat/drink in labor: Drink plenty of fluid (water most importantly, juice, soda or tea without caffeine). Eat rice, pasta, soup, cereal, bread/toast, and fruit. Avoid dairy and greasy food as they are difficult to digest and you may experience some nausea during labor.    Comfort measures:    Baths and showers (ok even with ruptured membranes, it may temporarily slow contractions if you are still in the early stage of labor)    Warm/hot packs for back pain or discomfort    Back, belly, or thigh massages    Standing, rocking, walking, leaning over bed or tables, side-lying and sleeping    Miscellaneous:     Contractions are timed from the beginning of one to the beginning of the next    Try hard to sleep during the early stage of labor when you are not that uncomfortable. Timing of contractions at this point is not important    Even if you cannot sleep, resting in bed or on the couch can help you maintain your energy for labor    When " you arrive at the hospital the nurse will check your baby's heartbeat, check your cervix, and will call us. The midwife on call will come in and be with you when you are in active labor    After hours you need to enter the hospital through the emergency room

## 2018-02-05 NOTE — MR AVS SNAPSHOT
After Visit Summary   2/5/2018    Haritha Bateman    MRN: [id number]           Patient Information     Date Of Birth          1990        Visit Information        Provider Department      2/5/2018 3:20 PM Juana Hollis APRN CNM [facility]        Today's Diagnoses     Supervision of high risk pregnancy in third trimester    -  1    39 weeks gestation of pregnancy        Anemia complicating pregnancy in third trimester          Care Instructions    Fetal Kick Counts    It is important to know when your baby's movements occur. We often get busy with work and life and do not pay close attention to their movements.        Women typically begin feeling movement between 18-22 weeks of gestation, sometimes it can be earlier or later depending on where your placenta is       Movements usually begin feeling like popping or fluttering and as the baby grows they become more pronounced    Toward the end of pregnancy as the baby gets larger they may not move as much or make as big of movements. Babies have maturing sleep cycles as well as not as much room to move and flip. If you are ever concerned about your baby's movements or have not felt the baby move for a while, we recommend you do a fetal kick count. Prior to starting your count drink a glass of water or juice and eat a snack. Then lay down on your side and begin to count movements.     How to do a Fetal Kick Counts    There are many different ways to monitor your baby's movements. Movements can range from large jabs to small kicks, or wiggles.  Hiccups count!      Count 10 movements in 2 hours when resting and focusing    Count 10 movements in 12 hours when doing normal activity    We recommend that if movements occur but seem decreased that you should be seen in the clinic or hospital for evaluation within 12 hours. If fetal movement is absent or fetal kick counts are low please contact us right away.    If you ever have any  "concerns about your baby's movements DO NOT HESITATE to call us, we are here for you!    [facility]  [phone]        Labor Instructions for Midwife Patients    When to call:  Both during and after office hours call  [phone]. There is a triage RN to take your calls and answer your questions 24 hours a day.  If she cannot answer your question she will page the midwife on call for you.    When to call:  Call anytime you have important concerns about you or your baby.     Call if:    You are having contractions at regular intervals about 5-6 minutes apart lasting 30-60 seconds and becoming increasingly more intense     You have an uncontrollable gush of fluid from your vagina or feel a pop and gush like your water has broken    You have HEAVY bleeding, like heavy period, blood running down your legs, or  soaking a pad.     Some bleeding after a pelvic exam, after intercourse, or in labor when your cervix is dilating is normal and is referred to as \"bloody show\"    You have severe, continuous back or abdominal pain    You feel it is time to go to the hospital    If this is your first labor, call when contractions are very intense and have been about every 3-4 minutes for about an hour    If it is your second labor or more, call when contractions are strong and about every 3-5 minutes or sooner depending on your level of discomfort.     Keep in mind we are always here for you! If you have questions, concerns please don't hesitate to call us.     What to eat/drink in labor: Drink plenty of fluid (water most importantly, juice, soda or tea without caffeine). Eat rice, pasta, soup, cereal, bread/toast, and fruit. Avoid dairy and greasy food as they are difficult to digest and you may experience some nausea during labor.    Comfort measures:    Baths and showers (ok even with ruptured membranes, it may temporarily slow contractions if you are still in the early stage of labor)    Warm/hot packs for " back pain or discomfort    Back, belly, or thigh massages    Standing, rocking, walking, leaning over bed or tables, side-lying and sleeping    Miscellaneous:     Contractions are timed from the beginning of one to the beginning of the next    Try hard to sleep during the early stage of labor when you are not that uncomfortable. Timing of contractions at this point is not important    Even if you cannot sleep, resting in bed or on the couch can help you maintain your energy for labor    When you arrive at the hospital the nurse will check your baby's heartbeat, check your cervix, and will call us. The midwife on call will come in and be with you when you are in active labor    After hours you need to enter the hospital through the emergency room            Follow-ups after your visit        Follow-up notes from your care team     Return in about 1 week (around 2/12/2018).      Your next 10 appointments already scheduled     Feb 12, 2018  1:30 PM CST   ESTABLISHED PRENATAL with ABIMAEL Yepez CNM   [facility] [place] ([facility] [place])    [address] [place] [phone]              Who to contact     If you have questions or need follow up information about today's clinic visit or your schedule please contact [facility] [place] directly at [phone].  Normal or non-critical lab and imaging results will be communicated to you by MyChart, letter or phone within 4 business days after the clinic has received the results. If you do not hear from us within 7 days, please contact the clinic through MyChart or phone. If you have a critical or abnormal lab result, we will notify you by phone as soon as possible.  Submit refill requests through [employer] or call your pharmacy and they will forward the refill request to us. Please allow 3 business days for your refill to be completed.          Additional Information About Your  Visit        MyChart Information     [employer] PChart gives you secure access to your electronic health record. If you see a primary care provider, you can also send messages to your care team and make appointments. If you have questions, please call your primary care clinic.  If you do not have a primary care provider, please call [phone] and they will assist you.        Care EveryWhere ID     This is your Care EveryWhere ID. This could be used by other organizations to access your [place] medical records  [id number]        Your Vitals Were     Last Period Breastfeeding? BMI (Body Mass Index)             05/04/2017 No 32.44 kg/m2          Blood Pressure from Last 3 Encounters:   02/05/18 124/82   01/31/18 110/70   01/22/18 118/80    Weight from Last 3 Encounters:   02/05/18 189 lb (85.7 kg)   01/31/18 185 lb (83.9 kg)   01/22/18 186 lb 3.2 oz (84.5 kg)              Today, you had the following     No orders found for display       Primary Care Provider Fax #    Physician No Ref-Primary [phone]       No address on file        Equal Access to Services     SABINE IBRAHIM : Hadii rupinder chandra Soleoncio, waaxda luqadaha, qaybta kaalmamelchor he, cayetano amador . So [facility] [phone].    ATENCIÓN: Si habla español, tiene a mishra disposición servicios gratuitos de asistencia lingüística. Sherin al [phone].    We comply with applicable federal civil rights laws and [place] laws. We do not discriminate on the basis of race, color, national origin, age, disability, sex, sexual orientation, or gender identity.            Thank you!     Thank you for choosing [facility] [place]  for your care. Our goal is always to provide you with excellent care. Hearing back from our patients is one way we can continue to improve our services. Please take a few minutes to complete the written survey that you may receive in the mail after your visit with us. Thank you!             Your Updated  Medication List - Protect others around you: Learn how to safely use, store and throw away your medicines at www.disposemymeds.org.          This list is accurate as of 2/5/18  3:29 PM.  Always use your most recent med list.                   Brand Name Dispense Instructions for use Diagnosis    IRON SUPPLEMENT PO      Take by mouth daily (with breakfast)        * order for DME     1 Device    Equipment being ordered: One pregnancy support belt for low back pain in pregnancy.    Low back pain during pregnancy in third trimester       * order for DME     1 Device    Equipment being ordered: One standard double electric breast pump with accessories, to be used for 99 years    Lactating mother, Breastfeeding (infant)       PRENATAL PLUS 27-1 MG Tabs     100 tablet    Take 1 tablet by mouth daily    Supervision of high risk pregnancy in third trimester, 33 weeks gestation of pregnancy       VITAMIN B 12 PO      Take by mouth daily        VITAMIN C PO      Take by mouth daily        * Notice:  This list has 2 medication(s) that are the same as other medications prescribed for you. Read the directions carefully, and ask your doctor or other care provider to review them with you.

## 2018-02-05 NOTE — PROGRESS NOTES
Feels well. Having some SOB. Discussed most likely due to growing uterus and fetal position. Advised to be seen if SOB is unrelieved by position changes or accompanied by chest pain or other symptoms.   Fetal movement: positive  Denies vaginal bleeding/lof, no contractions. Having some pelvic pressure when walking.   Warning signs reviewed   Labor signs and symptoms discussed  Denies s/sx of preeclampsia and aware of what to report. No further headaches or visual symptoms.  Planning to use nitrous oxide and possibly epidural for pain management in labor.   1+ edema in R ankle/foot (has previously been evaluated), denies worsening symptoms. Wanting to know if ok to soak her feet in a foot bath with Epsom salt, advised this was ok.   Return to clinic in 1 week; will schedule 41 week visit and BPP at that time.     SEMAJ Case  [facility], am serving as a scribe; to document services personally performed by Juana Hollis DNP, ABIMAEL, AMY- based on data collection and the provider's statements to me.    Provider Disclosure:  I agree with above History, Review of Systems, Physical exam and Plan. I have reviewed the content of the documentation and have edited it as needed. I have personally performed the services documented here and the documentation accurately represents those services and the decisions I have made.    Juana Hollis DNP, ABIMAEL, AMY

## 2018-02-12 ENCOUNTER — PRENATAL OFFICE VISIT (OUTPATIENT)
Dept: MIDWIFE SERVICES | Facility: CLINIC | Age: 28
End: 2018-02-12
Payer: COMMERCIAL

## 2018-02-12 VITALS — BODY MASS INDEX: 32.61 KG/M2 | DIASTOLIC BLOOD PRESSURE: 90 MMHG | WEIGHT: 190 LBS | SYSTOLIC BLOOD PRESSURE: 130 MMHG

## 2018-02-12 DIAGNOSIS — O48.0 POST-TERM PREGNANCY, 40-42 WEEKS OF GESTATION: ICD-10-CM

## 2018-02-12 DIAGNOSIS — Z3A.40 40 WEEKS GESTATION OF PREGNANCY: Primary | ICD-10-CM

## 2018-02-12 DIAGNOSIS — O99.013 ANEMIA COMPLICATING PREGNANCY IN THIRD TRIMESTER: ICD-10-CM

## 2018-02-12 PROCEDURE — 99207 ZZC PRENATAL VISIT: CPT | Performed by: NURSE PRACTITIONER

## 2018-02-12 NOTE — PROGRESS NOTES
Feels tired and ready to be done.  States that she had a headache last night that resolved with tylenol and rest.  Denies vision changes, rib pain, N/V.    Fetal movement: positive  Denies vaginal bleeding/lof, a little contractions that resolve on own  Warning signs reviewed   Labor signs and symptoms discussed  Denies s/sx of preeclampsia and aware of what to report  Discussed post dates management, order for BPP at 41 weeks  Return to clinic 3-4 days for BPP and OB visit    Angle VARGHESE CNM

## 2018-02-12 NOTE — MR AVS SNAPSHOT
"              After Visit Summary   2/12/2018    Haritha Bateman    MRN: [id number]           Patient Information     Date Of Birth          1990        Visit Information        Provider Department      2/12/2018 1:30 PM Angle Simmons APRN [facility] [place]        Today's Diagnoses     40 weeks gestation of pregnancy    -  1    Anemia complicating pregnancy in third trimester        Post-term pregnancy, 40-42 weeks of gestation          Care Instructions    Labor Instructions for Midwife Patients    When to call:  Both during and after office hours call  [phone]. There is a triage RN to take your calls and answer your questions 24 hours a day.  If she cannot answer your question she will page the midwife on call for you.    When to call:  Call anytime you have important concerns about you or your baby.     Call if:    You are having contractions at regular intervals about 5-6 minutes apart lasting 30-60 seconds and becoming increasingly more intense     You have an uncontrollable gush of fluid from your vagina or feel a pop and gush like your water has broken    You have HEAVY bleeding, like heavy period, blood running down your legs, or  soaking a pad.     Some bleeding after a pelvic exam, after intercourse, or in labor when your cervix is dilating is normal and is referred to as \"bloody show\"    You have severe, continuous back or abdominal pain    You feel it is time to go to the hospital    If this is your first labor, call when contractions are very intense and have been about every 3-4 minutes for about an hour    If it is your second labor or more, call when contractions are strong and about every 3-5 minutes or sooner depending on your level of discomfort.     Keep in mind we are always here for you! If you have questions, concerns please don't hesitate to call us.     What to eat/drink in labor: Drink plenty of fluid (water most importantly, juice, soda or tea without " caffeine). Eat rice, pasta, soup, cereal, bread/toast, and fruit. Avoid dairy and greasy food as they are difficult to digest and you may experience some nausea during labor.    Comfort measures:    Baths and showers (ok even with ruptured membranes, it may temporarily slow contractions if you are still in the early stage of labor)    Warm/hot packs for back pain or discomfort    Back, belly, or thigh massages    Standing, rocking, walking, leaning over bed or tables, side-lying and sleeping    Miscellaneous:     Contractions are timed from the beginning of one to the beginning of the next    Try hard to sleep during the early stage of labor when you are not that uncomfortable. Timing of contractions at this point is not important    Even if you cannot sleep, resting in bed or on the couch can help you maintain your energy for labor    When you arrive at the hospital the nurse will check your baby's heartbeat, check your cervix, and will call us. The midwife on call will come in and be with you when you are in active labor    After hours you need to enter the hospital through the emergency room       Post Dates Management    If you've gone beyond your due date you are probably thinking when is this baby coming!  Most babies are born on or after their due date so it is nothing to worry about.    If you are pregnant at 41 weeks we will start some increased monitoring to make sure that your baby and the placenta are healthy enough to continue your pregnancy.      We would have you come to the clinic every 3-4 days to be assessed with an ultrasound called a Biophysical profile (BPP).       This tells us how your baby is doing. We monitor fetal movement, breathing patterns, amniotic fluid level, and heart rate.     Research has shown that by 42 weeks gestation the placenta is not always healthy enough to support the pregnancy further and it is better for the baby to be born.  If you are still pregnant by 41 and a half  "weeks we will discuss induction of labor with you and the different options available.     PREECLAMPSIA SIGNS AND SYMPTOMS    Preeclampsia is a dangerous condition that some women develop in the second half of pregnancy. It can also begin after the baby is born.  Preeclampsia causes high blood pressure and can cause problems with many organ systems in your body.  It can also affect the growth of your baby. The exact cause of preeclampsia is unknown, however, there are signs and symptoms to watch for:    -A bad headache that doesn't improve with Tylenol  -Visual changes such as spots, flashes of light, blurry vision  -Pain in the upper right part of your abdomen, especially under the ribs that doesn't go away  -Nausea and/or vomiting  -Feeling extremely tired  -Yellowing of the skin and/or eyes  -Feeling \"not quite right\" or that something is wrong  -An extreme amount of swelling (some swelling in pregnancy is very normal)    If your midwife feels that you are developing preeclampsia, you will have lab tests drawn and will be monitored very closely.     If you are experiencing anyof these symptoms, call the [facility] immediately at [phone].    Fetal Kick Counts    It is important to know when your baby's movements occur. We often get busy with work and life and do not pay close attention to their movements.        Women typically begin feeling movement between 18-22 weeks of gestation, sometimes it can be earlier or later depending on where your placenta is       Movements usually begin feeling like popping or fluttering and as the baby grows they become more pronounced    Toward the end of pregnancy as the baby gets larger they may not move as much or make as big of movements. Babies have maturing sleep cycles as well as not as much room to move and flip. If you are ever concerned about your baby's movements or have not felt the baby move for a while, we recommend you do a fetal kick count. " Prior to starting your count drink a glass of water or juice and eat a snack. Then lay down on your side and begin to count movements.     How to do a Fetal Kick Counts    There are many different ways to monitor your baby's movements. Movements can range from large jabs to small kicks, or wiggles.  Hiccups count!      Count 10 movements in 2 hours when resting and focusing    Count 10 movements in 12 hours when doing normal activity    We recommend that if movements occur but seem decreased that you should be seen in the clinic or hospital for evaluation within 12 hours. If fetal movement is absent or fetal kick counts are low please contact us right away.    If you ever have any concerns about your baby's movements DO NOT HESITATE to call us, we are here for you!    [facility]  [phone]              Follow-ups after your visit        Follow-up notes from your care team     Return in about 4 days (around 2/16/2018), or if symptoms worsen or fail to improve, for Prenatal visit.      Your next 10 appointments already scheduled     Feb 15, 2018  1:00 PM CST   (Arrive by 12:45 PM)   US FETAL BIOPHYS PROFILE W/O NON STRESS TEST with WEUS2   [facility] [place] ([facility] [place])    [address] [place] [phone]           Please bring a list of your medicines (including vitamins, minerals and over-the-counter drugs). Also, tell your doctor about any allergies you may have. Wear comfortable clothes and leave your valuables at home.  If you re less than 20 weeks drink four 8-ounce glasses of fluid an hour before your exam. If you need to empty your bladder before your exam, try to release only a little urine. Then, drink another glass of fluid.  You may have up to two family members in the exam room. If you bring a small child, an adult must be there to care for him or her.  Please call the Imaging Department at your exam site with any  questions.            Feb 15, 2018  1:40 PM CST   ESTABLISHED PRENATAL with ABIMAEL Mckeon CNM   [facility] [place] ([facility] [place])    [address] [place] [address] [address]  [place] [id number] [phone]              Future tests that were ordered for you today     Open Future Orders        Priority Expected Expires Ordered    US OB Fetal Biophys Prf wo NonStrs Singls Sgl Routine  2/13/2019 2/12/2018            Who to contact     If you have questions or need follow up information about today's clinic visit or your schedule please contact [facility] [place] directly at [phone].  Normal or non-critical lab and imaging results will be communicated to you by [employer], letter or phone within 4 business days after the clinic has received the results. If you do not hear from us within 7 days, please contact the clinic through [employer] or phone. If you have a critical or abnormal lab result, we will notify you by phone as soon as possible.  Submit refill requests through [employer] or call your pharmacy and they will forward the refill request to us. Please allow 3 business days for your refill to be completed.          Additional Information About Your Visit        [employer] Information     [employer] gives you secure access to your electronic health record. If you see a primary care provider, you can also send messages to your care team and make appointments. If you have questions, please call your primary care clinic.  If you do not have a primary care provider, please call [phone] and they will assist you.        Care EveryWhere ID     This is your Care EveryWhere ID. This could be used by other organizations to access your [place] medical records  [id number]        Your Vitals Were     Last Period Breastfeeding? BMI (Body Mass Index)             05/04/2017 No 32.61 kg/m2          Blood Pressure from Last 3 Encounters:   02/12/18 130/90   02/05/18 124/82    01/31/18 110/70    Weight from Last 3 Encounters:   02/12/18 190 lb (86.2 kg)   02/05/18 189 lb (85.7 kg)   01/31/18 185 lb (83.9 kg)               Primary Care Provider Fax #    Physician No Ref-Primary [phone]       No address on file        Equal Access to Services     ROSELINESABINE PATRICE : Hadii aad ku hadasho Soomaali, waaxda luqadaha, qaybta kaalmada adeegyada, cayetano wilde diannen teresa iglesiadimitri amador . So [facility] [phone].    ATENCIÓN: Si habla español, tiene a mishra disposición servicios gratuitos de asistencia lingüística. Llame al [phone].    We comply with applicable federal civil rights laws and [place] laws. We do not discriminate on the basis of race, color, national origin, age, disability, sex, sexual orientation, or gender identity.            Thank you!     Thank you for choosing [facility] [place]  for your care. Our goal is always to provide you with excellent care. Hearing back from our patients is one way we can continue to improve our services. Please take a few minutes to complete the written survey that you may receive in the mail after your visit with us. Thank you!             Your Updated Medication List - Protect others around you: Learn how to safely use, store and throw away your medicines at www.disposemymeds.org.          This list is accurate as of 2/12/18  2:07 PM.  Always use your most recent med list.                   Brand Name Dispense Instructions for use Diagnosis    IRON SUPPLEMENT PO      Take by mouth daily (with breakfast)        * order for DME     1 Device    Equipment being ordered: One pregnancy support belt for low back pain in pregnancy.    Low back pain during pregnancy in third trimester       * order for DME     1 Device    Equipment being ordered: One standard double electric breast pump with accessories, to be used for 99 years    Lactating mother, Breastfeeding (infant)       PRENATAL PLUS 27-1 MG Tabs     100 tablet    Take 1 tablet by  mouth daily    Supervision of high risk pregnancy in third trimester, 33 weeks gestation of pregnancy       VITAMIN B 12 PO      Take by mouth daily        VITAMIN C PO      Take by mouth daily        * Notice:  This list has 2 medication(s) that are the same as other medications prescribed for you. Read the directions carefully, and ask your doctor or other care provider to review them with you.

## 2018-02-12 NOTE — PATIENT INSTRUCTIONS
"Labor Instructions for Midwife Patients    When to call:  Both during and after office hours call  [phone]. There is a triage RN to take your calls and answer your questions 24 hours a day.  If she cannot answer your question she will page the midwife on call for you.    When to call:  Call anytime you have important concerns about you or your baby.     Call if:    You are having contractions at regular intervals about 5-6 minutes apart lasting 30-60 seconds and becoming increasingly more intense     You have an uncontrollable gush of fluid from your vagina or feel a pop and gush like your water has broken    You have HEAVY bleeding, like heavy period, blood running down your legs, or  soaking a pad.     Some bleeding after a pelvic exam, after intercourse, or in labor when your cervix is dilating is normal and is referred to as \"bloody show\"    You have severe, continuous back or abdominal pain    You feel it is time to go to the hospital    If this is your first labor, call when contractions are very intense and have been about every 3-4 minutes for about an hour    If it is your second labor or more, call when contractions are strong and about every 3-5 minutes or sooner depending on your level of discomfort.     Keep in mind we are always here for you! If you have questions, concerns please don't hesitate to call us.     What to eat/drink in labor: Drink plenty of fluid (water most importantly, juice, soda or tea without caffeine). Eat rice, pasta, soup, cereal, bread/toast, and fruit. Avoid dairy and greasy food as they are difficult to digest and you may experience some nausea during labor.    Comfort measures:    Baths and showers (ok even with ruptured membranes, it may temporarily slow contractions if you are still in the early stage of labor)    Warm/hot packs for back pain or discomfort    Back, belly, or thigh massages    Standing, rocking, walking, leaning over bed or tables, side-lying and " sleeping    Miscellaneous:     Contractions are timed from the beginning of one to the beginning of the next    Try hard to sleep during the early stage of labor when you are not that uncomfortable. Timing of contractions at this point is not important    Even if you cannot sleep, resting in bed or on the couch can help you maintain your energy for labor    When you arrive at the hospital the nurse will check your baby's heartbeat, check your cervix, and will call us. The midwife on call will come in and be with you when you are in active labor    After hours you need to enter the hospital through the emergency room       Post Dates Management    If you've gone beyond your due date you are probably thinking when is this baby coming!  Most babies are born on or after their due date so it is nothing to worry about.    If you are pregnant at 41 weeks we will start some increased monitoring to make sure that your baby and the placenta are healthy enough to continue your pregnancy.      We would have you come to the clinic every 3-4 days to be assessed with an ultrasound called a Biophysical profile (BPP).       This tells us how your baby is doing. We monitor fetal movement, breathing patterns, amniotic fluid level, and heart rate.     Research has shown that by 42 weeks gestation the placenta is not always healthy enough to support the pregnancy further and it is better for the baby to be born.  If you are still pregnant by 41 and a half weeks we will discuss induction of labor with you and the different options available.     PREECLAMPSIA SIGNS AND SYMPTOMS    Preeclampsia is a dangerous condition that some women develop in the second half of pregnancy. It can also begin after the baby is born.  Preeclampsia causes high blood pressure and can cause problems with many organ systems in your body.  It can also affect the growth of your baby. The exact cause of preeclampsia is unknown, however, there are signs and  "symptoms to watch for:    -A bad headache that doesn't improve with Tylenol  -Visual changes such as spots, flashes of light, blurry vision  -Pain in the upper right part of your abdomen, especially under the ribs that doesn't go away  -Nausea and/or vomiting  -Feeling extremely tired  -Yellowing of the skin and/or eyes  -Feeling \"not quite right\" or that something is wrong  -An extreme amount of swelling (some swelling in pregnancy is very normal)    If your midwife feels that you are developing preeclampsia, you will have lab tests drawn and will be monitored very closely.     If you are experiencing anyof these symptoms, call the [facility] immediately at [phone].    Fetal Kick Counts    It is important to know when your baby's movements occur. We often get busy with work and life and do not pay close attention to their movements.        Women typically begin feeling movement between 18-22 weeks of gestation, sometimes it can be earlier or later depending on where your placenta is       Movements usually begin feeling like popping or fluttering and as the baby grows they become more pronounced    Toward the end of pregnancy as the baby gets larger they may not move as much or make as big of movements. Babies have maturing sleep cycles as well as not as much room to move and flip. If you are ever concerned about your baby's movements or have not felt the baby move for a while, we recommend you do a fetal kick count. Prior to starting your count drink a glass of water or juice and eat a snack. Then lay down on your side and begin to count movements.     How to do a Fetal Kick Counts    There are many different ways to monitor your baby's movements. Movements can range from large jabs to small kicks, or wiggles.  Hiccups count!      Count 10 movements in 2 hours when resting and focusing    Count 10 movements in 12 hours when doing normal activity    We recommend that if movements occur but seem " decreased that you should be seen in the clinic or hospital for evaluation within 12 hours. If fetal movement is absent or fetal kick counts are low please contact us right away.    If you ever have any concerns about your baby's movements DO NOT HESITATE to call us, we are here for you!    [facility]  [phone]

## 2018-02-13 ENCOUNTER — PRENATAL OFFICE VISIT (OUTPATIENT)
Dept: MIDWIFE SERVICES | Facility: CLINIC | Age: 28
End: 2018-02-13
Payer: COMMERCIAL

## 2018-02-13 ENCOUNTER — TELEPHONE (OUTPATIENT)
Dept: NURSING | Facility: CLINIC | Age: 28
End: 2018-02-13

## 2018-02-13 VITALS — WEIGHT: 190 LBS | SYSTOLIC BLOOD PRESSURE: 132 MMHG | BODY MASS INDEX: 32.61 KG/M2 | DIASTOLIC BLOOD PRESSURE: 82 MMHG

## 2018-02-13 DIAGNOSIS — O99.013 ANEMIA COMPLICATING PREGNANCY IN THIRD TRIMESTER: ICD-10-CM

## 2018-02-13 DIAGNOSIS — R03.0 BLOOD PRESSURE INCREASE DIASTOLIC: Primary | ICD-10-CM

## 2018-02-13 LAB
ALBUMIN SERPL-MCNC: 2.8 G/DL (ref 3.4–5)
ALP SERPL-CCNC: 198 U/L (ref 40–150)
ALT SERPL W P-5'-P-CCNC: 17 U/L (ref 0–50)
ANION GAP SERPL CALCULATED.3IONS-SCNC: 9 MMOL/L (ref 3–14)
AST SERPL W P-5'-P-CCNC: 16 U/L (ref 0–45)
BILIRUB SERPL-MCNC: 0.2 MG/DL (ref 0.2–1.3)
BUN SERPL-MCNC: 11 MG/DL (ref 7–30)
CALCIUM SERPL-MCNC: 8.7 MG/DL (ref 8.5–10.1)
CHLORIDE SERPL-SCNC: 106 MMOL/L (ref 94–109)
CO2 SERPL-SCNC: 22 MMOL/L (ref 20–32)
CREAT SERPL-MCNC: 0.46 MG/DL (ref 0.52–1.04)
CREAT UR-MCNC: 168 MG/DL
ERYTHROCYTE [DISTWIDTH] IN BLOOD BY AUTOMATED COUNT: 16.1 % (ref 10–15)
GFR SERPL CREATININE-BSD FRML MDRD: >90 ML/MIN/1.7M2
GLUCOSE SERPL-MCNC: 102 MG/DL (ref 70–99)
HCT VFR BLD AUTO: 36.5 % (ref 35–47)
HGB BLD-MCNC: 12.7 G/DL (ref 11.7–15.7)
MCH RBC QN AUTO: 31.5 PG (ref 26.5–33)
MCHC RBC AUTO-ENTMCNC: 34.8 G/DL (ref 31.5–36.5)
MCV RBC AUTO: 91 FL (ref 78–100)
PLATELET # BLD AUTO: 165 10E9/L (ref 150–450)
POTASSIUM SERPL-SCNC: 3.9 MMOL/L (ref 3.4–5.3)
PROT SERPL-MCNC: 6.8 G/DL (ref 6.8–8.8)
PROT UR-MCNC: 0.24 G/L
PROT/CREAT 24H UR: 0.14 G/G CR (ref 0–0.2)
RBC # BLD AUTO: 4.03 10E12/L (ref 3.8–5.2)
SODIUM SERPL-SCNC: 137 MMOL/L (ref 133–144)
WBC # BLD AUTO: 6.8 10E9/L (ref 4–11)

## 2018-02-13 PROCEDURE — 99207 ZZC PRENATAL VISIT: CPT | Performed by: ADVANCED PRACTICE MIDWIFE

## 2018-02-13 PROCEDURE — 36415 COLL VENOUS BLD VENIPUNCTURE: CPT | Performed by: ADVANCED PRACTICE MIDWIFE

## 2018-02-13 PROCEDURE — 84156 ASSAY OF PROTEIN URINE: CPT | Performed by: ADVANCED PRACTICE MIDWIFE

## 2018-02-13 PROCEDURE — 80053 COMPREHEN METABOLIC PANEL: CPT | Performed by: ADVANCED PRACTICE MIDWIFE

## 2018-02-13 PROCEDURE — 85027 COMPLETE CBC AUTOMATED: CPT | Performed by: ADVANCED PRACTICE MIDWIFE

## 2018-02-13 NOTE — PROGRESS NOTES
Here for evaluation of pre-e. Reports frontal headache since yesterday around 1900. Sensitive to the light. Tylenol has helped, but headache returns. Denies vision changes or epigastric pain.  Comfort measures discussed   Lost mucous plug this morning, had two episodes of mild menstrual like cramps afterwards. None since. Declines SVE, had one yesterday, 1/60/-2  Aware of pre-e s/sx to monitor for and report  Pre-e labs drawn today stat. Will call with any abnormals  Keep next scheduled appointment, Friday 2/16/2018    Rylie VARGHESE CNM

## 2018-02-13 NOTE — TELEPHONE ENCOUNTER
40w5d.  Pt started having headaches on and off again throughout this weekend. Pt was fine at clinic appt but they started again in the evening and they were waking pt up. Pt states tylenol helps a little but only for short periods.   Pt denies swelling, chest pain, SOB, vision changes, or upper gastric pain. LOF or vaginal bleeding.   Pt reports positive fetal movement.   Informed pt to come in to clinic for BP check with nurses. Appt scheduled.

## 2018-02-13 NOTE — MR AVS SNAPSHOT
After Visit Summary   2/13/2018    Haritha Bateman    MRN: [id number]           Patient Information     Date Of Birth          1990        Visit Information        Provider Department      2/13/2018 2:40 PM Rylie Palomino APRN CNM [facility]        Today's Diagnoses     Blood pressure increase diastolic    -  1    Anemia complicating pregnancy in third trimester           Follow-ups after your visit        Your next 10 appointments already scheduled     Feb 15, 2018  1:00 PM CST   (Arrive by 12:45 PM)   US FETAL BIOPHYS PROFILE W/O NON STRESS TEST with WEUS2   [facility] ([facility]    [address]  [place] [facility] [place] [address] [phone]           Please bring a list of your medicines (including vitamins, minerals and over-the-counter drugs). Also, tell your doctor about any allergies you may have. Wear comfortable clothes and leave your valuables at home.  If you re less than 20 weeks drink four 8-ounce glasses of fluid an hour before your exam. If you need to empty your bladder before your exam, try to release only a little urine. Then, drink another glass of fluid.  You may have up to two family members in the exam room. If you bring a small child, an adult must be there to care for him or her.  Please call the Imaging Department at your exam site with any questions.            Feb 15, 2018  1:40 PM CST   ESTABLISHED PRENATAL with ABIMAEL Mckeon CNM   [facility] ([facility]    [address]  [place] [facility] [place] [address] [phone]              Future tests that were ordered for you today     Open Future Orders        Priority Expected Expires Ordered    US OB Fetal Biophys Prf wo NonStrs Singls Sgl Routine  2/13/2019 2/12/2018            Who to contact     If you have questions or need follow up information about today's clinic visit or  your schedule please contact [facility] directly at [phone].  Normal or non-critical lab and imaging results will be communicated to you by MyChart, letter or phone within 4 business days after the clinic has received the results. If you do not hear from us within 7 days, please contact the clinic through [employer] or phone. If you have a critical or abnormal lab result, we will notify you by phone as soon as possible.  Submit refill requests through [employer] or call your pharmacy and they will forward the refill request to us. Please allow 3 business days for your refill to be completed.          Additional Information About Your Visit        [employer] Information     [employer] gives you secure access to your electronic health record. If you see a primary care provider, you can also send messages to your care team and make appointments. If you have questions, please call your primary care clinic.  If you do not have a primary care provider, please call [phone] and they will assist you.        Care EveryWhere ID     This is your Care EveryWhere ID. This could be used by other organizations to access your [facility] medical records  [id number]        Your Vitals Were     Last Period BMI (Body Mass Index)                05/04/2017 32.61 kg/m2           Blood Pressure from Last 3 Encounters:   02/13/18 132/82   02/12/18 130/90   02/05/18 124/82    Weight from Last 3 Encounters:   02/13/18 190 lb (86.2 kg)   02/12/18 190 lb (86.2 kg)   02/05/18 189 lb (85.7 kg)              We Performed the Following     CBC with platelets     Comprehensive metabolic panel     Creatinine urine calculation only     Protein  random urine with Creat Ratio        Primary Care Provider Fax #    Physician No Ref-Primary [phone]       No address on file        Equal Access to Services     ADEOLA IBRAHIM AH: Nieves Hamilton, falguni smith, cayetano abdullahi  laedis snyder. So [facility] [phone].    ATENCIÓN: Si habla caitlyn, tiene a mishra disposición servicios gratuitos de asistencia lingüística. Sherin al [phone].    We comply with applicable federal civil rights laws and [place] laws. We do not discriminate on the basis of race, color, national origin, age, disability, sex, sexual orientation, or gender identity.            Thank you!     Thank you for choosing [facility]  for your care. Our goal is always to provide you with excellent care. Hearing back from our patients is one way we can continue to improve our services. Please take a few minutes to complete the written survey that you may receive in the mail after your visit with us. Thank you!             Your Updated Medication List - Protect others around you: Learn how to safely use, store and throw away your medicines at www.disposemymeds.org.          This list is accurate as of 2/13/18  3:13 PM.  Always use your most recent med list.                   Brand Name Dispense Instructions for use Diagnosis    IRON SUPPLEMENT PO      Take by mouth daily (with breakfast)        * order for DME     1 Device    Equipment being ordered: One pregnancy support belt for low back pain in pregnancy.    Low back pain during pregnancy in third trimester       * order for DME     1 Device    Equipment being ordered: One standard double electric breast pump with accessories, to be used for 99 years    Lactating mother, Breastfeeding (infant)       PRENATAL PLUS 27-1 MG Tabs     100 tablet    Take 1 tablet by mouth daily    Supervision of high risk pregnancy in third trimester, 33 weeks gestation of pregnancy       VITAMIN B 12 PO      Take by mouth daily        VITAMIN C PO      Take by mouth daily        * Notice:  This list has 2 medication(s) that are the same as other medications prescribed for you. Read the directions carefully, and ask your doctor or other care provider to review them with you.

## 2018-02-15 ENCOUNTER — PRENATAL OFFICE VISIT (OUTPATIENT)
Dept: MIDWIFE SERVICES | Facility: CLINIC | Age: 28
End: 2018-02-15
Payer: COMMERCIAL

## 2018-02-15 ENCOUNTER — RADIANT APPOINTMENT (OUTPATIENT)
Dept: ULTRASOUND IMAGING | Facility: CLINIC | Age: 28
End: 2018-02-15
Payer: COMMERCIAL

## 2018-02-15 VITALS — SYSTOLIC BLOOD PRESSURE: 130 MMHG | WEIGHT: 191 LBS | BODY MASS INDEX: 32.79 KG/M2 | DIASTOLIC BLOOD PRESSURE: 84 MMHG

## 2018-02-15 DIAGNOSIS — O48.0 POST-TERM PREGNANCY, 40-42 WEEKS OF GESTATION: ICD-10-CM

## 2018-02-15 DIAGNOSIS — O99.013 ANEMIA COMPLICATING PREGNANCY IN THIRD TRIMESTER: ICD-10-CM

## 2018-02-15 DIAGNOSIS — O09.93 SUPERVISION OF HIGH RISK PREGNANCY IN THIRD TRIMESTER: Primary | ICD-10-CM

## 2018-02-15 PROCEDURE — 76816 OB US FOLLOW-UP PER FETUS: CPT | Performed by: OBSTETRICS & GYNECOLOGY

## 2018-02-15 PROCEDURE — 99207 ZZC PRENATAL VISIT: CPT | Performed by: ADVANCED PRACTICE MIDWIFE

## 2018-02-15 PROCEDURE — 76819 FETAL BIOPHYS PROFIL W/O NST: CPT | Performed by: OBSTETRICS & GYNECOLOGY

## 2018-02-15 NOTE — PROGRESS NOTES
Feels well. No complaints.   Fetal movement: positive  BPP:  8/8, ALINA:  10.89  Cervical exam/membrane sweep declined today  Denies vaginal bleeding/lof. Lost mucous plug last week. Not having any contractions  Warning signs reviewed   Labor signs and symptoms discussed  Denies s/sx of preeclampsia and aware of what to report. Had a slight headache yesterday but states it resolved with Tylenol.  Discussed post dates management. Advised BPP on Monday 2/19. Ordered.   Return to clinic in 4 days for BPP and CNM visit.  Will check cervix and set up IOL at that time if needed.    SEMAJ Case  [facility]    Beryl TREVINO, am serving as a scribe; to document services personally performed by Juana Hollis DNP, ABIMAEL, AMY- based on data collection and the provider's statements to me.    Provider Disclosure:  I agree with above History, Review of Systems, Physical exam and Plan. I have reviewed the content of the documentation and have edited it as needed. I have personally performed the services documented here and the documentation accurately represents those services and the decisions I have made.    Juana Hollis DNP, APRN, SUSYM

## 2018-02-15 NOTE — PATIENT INSTRUCTIONS
"Labor Instructions for Midwife Patients    When to call:  Both during and after office hours call  [phone]. There is a triage RN to take your calls and answer your questions 24 hours a day.  If she cannot answer your question she will page the midwife on call for you.    When to call:  Call anytime you have important concerns about you or your baby.     Call if:    You are having contractions at regular intervals about 5-6 minutes apart lasting 30-60 seconds and becoming increasingly more intense     You have an uncontrollable gush of fluid from your vagina or feel a pop and gush like your water has broken    You have HEAVY bleeding, like heavy period, blood running down your legs, or  soaking a pad.     Some bleeding after a pelvic exam, after intercourse, or in labor when your cervix is dilating is normal and is referred to as \"bloody show\"    You have severe, continuous back or abdominal pain    You feel it is time to go to the hospital    If this is your first labor, call when contractions are very intense and have been about every 3-4 minutes for about an hour    If it is your second labor or more, call when contractions are strong and about every 3-5 minutes or sooner depending on your level of discomfort.     Keep in mind we are always here for you! If you have questions, concerns please don't hesitate to call us.     What to eat/drink in labor: Drink plenty of fluid (water most importantly, juice, soda or tea without caffeine). Eat rice, pasta, soup, cereal, bread/toast, and fruit. Avoid dairy and greasy food as they are difficult to digest and you may experience some nausea during labor.    Comfort measures:    Baths and showers (ok even with ruptured membranes, it may temporarily slow contractions if you are still in the early stage of labor)    Warm/hot packs for back pain or discomfort    Back, belly, or thigh massages    Standing, rocking, walking, leaning over bed or tables, side-lying and " sleeping    Miscellaneous:     Contractions are timed from the beginning of one to the beginning of the next    Try hard to sleep during the early stage of labor when you are not that uncomfortable. Timing of contractions at this point is not important    Even if you cannot sleep, resting in bed or on the couch can help you maintain your energy for labor    When you arrive at the hospital the nurse will check your baby's heartbeat, check your cervix, and will call us. The midwife on call will come in and be with you when you are in active labor    After hours you need to enter the hospital through the emergency room       Post Dates Management    If you've gone beyond your due date you are probably thinking when is this baby coming!  Most babies are born on or after their due date so it is nothing to worry about.    If you are pregnant at 41 weeks we will start some increased monitoring to make sure that your baby and the placenta are healthy enough to continue your pregnancy.      We would have you come to the clinic every 3-4 days to be assessed with an ultrasound called a Biophysical profile (BPP).       This tells us how your baby is doing. We monitor fetal movement, breathing patterns, amniotic fluid level, and heart rate.     Research has shown that by 42 weeks gestation the placenta is not always healthy enough to support the pregnancy further and it is better for the baby to be born.  If you are still pregnant by 41 and a half weeks we will discuss induction of labor with you and the different options available.     Fetal Kick Counts    It is important to know when your baby's movements occur. We often get busy with work and life and do not pay close attention to their movements.        Women typically begin feeling movement between 18-22 weeks of gestation, sometimes it can be earlier or later depending on where your placenta is       Movements usually begin feeling like popping or fluttering and as the  baby grows they become more pronounced    Toward the end of pregnancy as the baby gets larger they may not move as much or make as big of movements. Babies have maturing sleep cycles as well as not as much room to move and flip. If you are ever concerned about your baby's movements or have not felt the baby move for a while, we recommend you do a fetal kick count. Prior to starting your count drink a glass of water or juice and eat a snack. Then lay down on your side and begin to count movements.     How to do a Fetal Kick Counts    There are many different ways to monitor your baby's movements. Movements can range from large jabs to small kicks, or wiggles.  Hiccups count!      Count 10 movements in 2 hours when resting and focusing    Count 10 movements in 12 hours when doing normal activity    We recommend that if movements occur but seem decreased that you should be seen in the clinic or hospital for evaluation within 12 hours. If fetal movement is absent or fetal kick counts are low please contact us right away.    If you ever have any concerns about your baby's movements DO NOT HESITATE to call us, we are here for you!    [facility] for [facility]  [phone]

## 2018-02-15 NOTE — MR AVS SNAPSHOT
"              After Visit Summary   2/15/2018    Haritha Bateman    MRN: [id number]           Patient Information     Date Of Birth          1990        Visit Information        Provider Department      2/15/2018 4:30 PM Juana Hollis APRN CNM [facility]        Today's Diagnoses     Supervision of high risk pregnancy in third trimester    -  1    Post-term pregnancy, 40-42 weeks of gestation        Anemia complicating pregnancy in third trimester          Care Instructions    Labor Instructions for Midwife Patients    When to call:  Both during and after office hours call  [phone]. There is a triage RN to take your calls and answer your questions 24 hours a day.  If she cannot answer your question she will page the midwife on call for you.    When to call:  Call anytime you have important concerns about you or your baby.     Call if:    You are having contractions at regular intervals about 5-6 minutes apart lasting 30-60 seconds and becoming increasingly more intense     You have an uncontrollable gush of fluid from your vagina or feel a pop and gush like your water has broken    You have HEAVY bleeding, like heavy period, blood running down your legs, or  soaking a pad.     Some bleeding after a pelvic exam, after intercourse, or in labor when your cervix is dilating is normal and is referred to as \"bloody show\"    You have severe, continuous back or abdominal pain    You feel it is time to go to the hospital    If this is your first labor, call when contractions are very intense and have been about every 3-4 minutes for about an hour    If it is your second labor or more, call when contractions are strong and about every 3-5 minutes or sooner depending on your level of discomfort.     Keep in mind we are always here for you! If you have questions, concerns please don't hesitate to call us.     What to eat/drink in labor: Drink plenty of fluid (water most importantly, juice, soda " or tea without caffeine). Eat rice, pasta, soup, cereal, bread/toast, and fruit. Avoid dairy and greasy food as they are difficult to digest and you may experience some nausea during labor.    Comfort measures:    Baths and showers (ok even with ruptured membranes, it may temporarily slow contractions if you are still in the early stage of labor)    Warm/hot packs for back pain or discomfort    Back, belly, or thigh massages    Standing, rocking, walking, leaning over bed or tables, side-lying and sleeping    Miscellaneous:     Contractions are timed from the beginning of one to the beginning of the next    Try hard to sleep during the early stage of labor when you are not that uncomfortable. Timing of contractions at this point is not important    Even if you cannot sleep, resting in bed or on the couch can help you maintain your energy for labor    When you arrive at the hospital the nurse will check your baby's heartbeat, check your cervix, and will call us. The midwife on call will come in and be with you when you are in active labor    After hours you need to enter the hospital through the emergency room       Post Dates Management    If you've gone beyond your due date you are probably thinking when is this baby coming!  Most babies are born on or after their due date so it is nothing to worry about.    If you are pregnant at 41 weeks we will start some increased monitoring to make sure that your baby and the placenta are healthy enough to continue your pregnancy.      We would have you come to the clinic every 3-4 days to be assessed with an ultrasound called a Biophysical profile (BPP).       This tells us how your baby is doing. We monitor fetal movement, breathing patterns, amniotic fluid level, and heart rate.     Research has shown that by 42 weeks gestation the placenta is not always healthy enough to support the pregnancy further and it is better for the baby to be born.  If you are still pregnant by  41 and a half weeks we will discuss induction of labor with you and the different options available.     Fetal Kick Counts    It is important to know when your baby's movements occur. We often get busy with work and life and do not pay close attention to their movements.        Women typically begin feeling movement between 18-22 weeks of gestation, sometimes it can be earlier or later depending on where your placenta is       Movements usually begin feeling like popping or fluttering and as the baby grows they become more pronounced    Toward the end of pregnancy as the baby gets larger they may not move as much or make as big of movements. Babies have maturing sleep cycles as well as not as much room to move and flip. If you are ever concerned about your baby's movements or have not felt the baby move for a while, we recommend you do a fetal kick count. Prior to starting your count drink a glass of water or juice and eat a snack. Then lay down on your side and begin to count movements.     How to do a Fetal Kick Counts    There are many different ways to monitor your baby's movements. Movements can range from large jabs to small kicks, or wiggles.  Hiccups count!      Count 10 movements in 2 hours when resting and focusing    Count 10 movements in 12 hours when doing normal activity    We recommend that if movements occur but seem decreased that you should be seen in the clinic or hospital for evaluation within 12 hours. If fetal movement is absent or fetal kick counts are low please contact us right away.    If you ever have any concerns about your baby's movements DO NOT HESITATE to call us, we are here for you!    [facility]  [phone]                Follow-ups after your visit        Follow-up notes from your care team     Return in 4 days (on 2/19/2018).      Your next 10 appointments already scheduled     Feb 19, 2018  9:20 AM CST   (Arrive by 9:05 AM)   US FETAL BIOPHYS PROFILE W/O NON  STRESS TEST with WEUS1   [facility]    [address]  [place] [id number] [phone]           Please bring a list of your medicines (including vitamins, minerals and over-the-counter drugs). Also, tell your doctor about any allergies you may have. Wear comfortable clothes and leave your valuables at home.  If you re less than 20 weeks drink four 8-ounce glasses of fluid an hour before your exam. If you need to empty your bladder before your exam, try to release only a little urine. Then, drink another glass of fluid.  You may have up to two family members in the exam room. If you bring a small child, an adult must be there to care for him or her.  Please call the Imaging Department at your exam site with any questions.            Feb 19, 2018 10:20 AM CST   ESTABLISHED PRENATAL with ABIMAEL Yepez CNM   [facility]    [address]  [place] [id number] [phone]              Future tests that were ordered for you today     Open Future Orders        Priority Expected Expires Ordered    US OB Fetal Biophys Prf wo NonStrs Singls Sgl Routine  2/16/2019 2/15/2018            Who to contact     If you have questions or need follow up information about today's clinic visit or your schedule please contact [facility] directly at [phone].  Normal or non-critical lab and imaging results will be communicated to you by MyChart, letter or phone within 4 business days after the clinic has received the results. If you do not hear from us within 7 days, please contact the clinic through MyChart or phone. If you have a critical or abnormal lab result, we will notify you by phone as soon as possible.  Submit refill requests through [employer] or call your pharmacy and they will forward the refill request to us. Please allow 3 business days  for your refill to be completed.          Additional Information About Your Visit        MyChart Information     [employer] gives you secure access to your electronic health record. If you see a primary care provider, you can also send messages to your care team and make appointments. If you have questions, please call your primary care clinic.  If you do not have a primary care provider, please call [phone] and they will assist you.        Care EveryWhere ID     This is your Care EveryWhere ID. This could be used by other organizations to access your [place] medical records  [id number]        Your Vitals Were     Last Period BMI (Body Mass Index)                05/04/2017 32.79 kg/m2           Blood Pressure from Last 3 Encounters:   02/15/18 130/84   02/13/18 132/82   02/12/18 130/90    Weight from Last 3 Encounters:   02/15/18 191 lb (86.6 kg)   02/13/18 190 lb (86.2 kg)   02/12/18 190 lb (86.2 kg)               Primary Care Provider Fax #    Physician No Ref-Primary [phone]       No address on file        Equal Access to Services     [facility]: Hadii rupinder escobar hadasho Soleoncio, waaxda luqadaha, qaybta kaalmada adeeglynette, cayetano amador . So [facility] [phone].    ATENCIÓN: Si habla español, tiene a mishra disposición servicios gratuitos de asistencia lingüística. Llame al [phone].    We comply with applicable federal civil rights laws and [place] laws. We do not discriminate on the basis of race, color, national origin, age, disability, sex, sexual orientation, or gender identity.            Thank you!     Thank you for choosing [facility]  for your care. Our goal is always to provide you with excellent care. Hearing back from our patients is one way we can continue to improve our services. Please take a few minutes to complete the written survey that you may receive in the mail after your visit with us. Thank you!             Your Updated Medication  List - Protect others around you: Learn how to safely use, store and throw away your medicines at www.disposemymeds.org.          This list is accurate as of 2/15/18  4:57 PM.  Always use your most recent med list.                   Brand Name Dispense Instructions for use Diagnosis    IRON SUPPLEMENT PO      Take by mouth daily (with breakfast)        * order for DME     1 Device    Equipment being ordered: One pregnancy support belt for low back pain in pregnancy.    Low back pain during pregnancy in third trimester       * order for DME     1 Device    Equipment being ordered: One standard double electric breast pump with accessories, to be used for 99 years    Lactating mother, Breastfeeding (infant)       PRENATAL PLUS 27-1 MG Tabs     100 tablet    Take 1 tablet by mouth daily    Supervision of high risk pregnancy in third trimester, 33 weeks gestation of pregnancy       VITAMIN B 12 PO      Take by mouth daily        VITAMIN C PO      Take by mouth daily        * Notice:  This list has 2 medication(s) that are the same as other medications prescribed for you. Read the directions carefully, and ask your doctor or other care provider to review them with you.

## 2018-02-19 ENCOUNTER — PRENATAL OFFICE VISIT (OUTPATIENT)
Dept: MIDWIFE SERVICES | Facility: CLINIC | Age: 28
End: 2018-02-19
Payer: COMMERCIAL

## 2018-02-19 ENCOUNTER — RADIANT APPOINTMENT (OUTPATIENT)
Dept: ULTRASOUND IMAGING | Facility: CLINIC | Age: 28
End: 2018-02-19
Payer: COMMERCIAL

## 2018-02-19 VITALS — DIASTOLIC BLOOD PRESSURE: 76 MMHG | SYSTOLIC BLOOD PRESSURE: 124 MMHG | BODY MASS INDEX: 32.44 KG/M2 | WEIGHT: 189 LBS

## 2018-02-19 DIAGNOSIS — O48.0 POST-TERM PREGNANCY, 40-42 WEEKS OF GESTATION: ICD-10-CM

## 2018-02-19 DIAGNOSIS — O48.0 41 WEEKS GESTATION OF PREGNANCY: ICD-10-CM

## 2018-02-19 DIAGNOSIS — Z3A.41 41 WEEKS GESTATION OF PREGNANCY: ICD-10-CM

## 2018-02-19 DIAGNOSIS — O09.93 SUPERVISION OF HIGH RISK PREGNANCY IN THIRD TRIMESTER: Primary | ICD-10-CM

## 2018-02-19 DIAGNOSIS — O99.013 ANEMIA COMPLICATING PREGNANCY IN THIRD TRIMESTER: ICD-10-CM

## 2018-02-19 PROCEDURE — 76815 OB US LIMITED FETUS(S): CPT | Performed by: OBSTETRICS & GYNECOLOGY

## 2018-02-19 PROCEDURE — 59426 ANTEPARTUM CARE ONLY: CPT | Performed by: ADVANCED PRACTICE MIDWIFE

## 2018-02-19 PROCEDURE — 76819 FETAL BIOPHYS PROFIL W/O NST: CPT | Performed by: OBSTETRICS & GYNECOLOGY

## 2018-02-19 PROCEDURE — 99207 ZZC PRENATAL VISIT: CPT | Performed by: ADVANCED PRACTICE MIDWIFE

## 2018-02-19 NOTE — MR AVS SNAPSHOT
After Visit Summary   2/19/2018    Haritha Bateman    MRN: [id number]           Patient Information     Date Of Birth          1990        Visit Information        Provider Department      2/19/2018 10:20 AM Yandy Presley APRN CNM [facility]        Today's Diagnoses     Supervision of high risk pregnancy in third trimester    -  1    Anemia complicating pregnancy in third trimester        Post-term pregnancy, 40-42 weeks of gestation        41 weeks gestation of pregnancy           Follow-ups after your visit        Who to contact     If you have questions or need follow up information about today's clinic visit or your schedule please contact [facility] directly at [phone].  Normal or non-critical lab and imaging results will be communicated to you by [employer], letter or phone within 4 business days after the clinic has received the results. If you do not hear from us within 7 days, please contact the clinic through [employer] or phone. If you have a critical or abnormal lab result, we will notify you by phone as soon as possible.  Submit refill requests through [employer] or call your pharmacy and they will forward the refill request to us. Please allow 3 business days for your refill to be completed.          Additional Information About Your Visit        MyChart Information     [employer] gives you secure access to your electronic health record. If you see a primary care provider, you can also send messages to your care team and make appointments. If you have questions, please call your primary care clinic.  If you do not have a primary care provider, please call [phone] and they will assist you.        Care EveryWhere ID     This is your Care EveryWhere ID. This could be used by other organizations to access your [place] medical records  [id number]        Your Vitals Were     Last Period BMI (Body Mass Index)                05/04/2017 32.44  kg/m2           Blood Pressure from Last 3 Encounters:   02/19/18 124/76   02/15/18 130/84   02/13/18 132/82    Weight from Last 3 Encounters:   02/19/18 189 lb (85.7 kg)   02/15/18 191 lb (86.6 kg)   02/13/18 190 lb (86.2 kg)              Today, you had the following     No orders found for display       Primary Care Provider Fax #    Physician No Ref-Primary [phone]       No address on file        Equal Access to Services     ADEOLA IBRAHIM : Hadii aad ku hadasho Soomaali, waaxda luqadaha, qaybta kaalmada adeegyada, waxdagoberto wilde haydishan teresa amador . So [facility] [place] [facility] [phone].    ATENCIÓN: Si habla español, tiene a mishra disposición servicios gratuitos de asistencia lingüística. Llame al [phone].    We comply with applicable federal civil rights laws and [place] laws. We do not discriminate on the basis of race, color, national origin, age, disability, sex, sexual orientation, or gender identity.            Thank you!     Thank you for choosing [facility] [place]  for your care. Our goal is always to provide you with excellent care. Hearing back from our patients is one way we can continue to improve our services. Please take a few minutes to complete the written survey that you may receive in the mail after your visit with us. Thank you!             Your Updated Medication List - Protect others around you: Learn how to safely use, store and throw away your medicines at www.disposemymeds.org.          This list is accurate as of 2/19/18 10:44 AM.  Always use your most recent med list.                   Brand Name Dispense Instructions for use Diagnosis    IRON SUPPLEMENT PO      Take by mouth daily (with breakfast)        * order for DME     1 Device    Equipment being ordered: One pregnancy support belt for low back pain in pregnancy.    Low back pain during pregnancy in third trimester       * order for DME     1 Device    Equipment being ordered: One standard double electric breast pump  with accessories, to be used for 99 years    Lactating mother, Breastfeeding (infant)       PRENATAL PLUS 27-1 MG Tabs     100 tablet    Take 1 tablet by mouth daily    Supervision of high risk pregnancy in third trimester, 33 weeks gestation of pregnancy       VITAMIN B 12 PO      Take by mouth daily        VITAMIN C PO      Take by mouth daily        * Notice:  This list has 2 medication(s) that are the same as other medications prescribed for you. Read the directions carefully, and ask your doctor or other care provider to review them with you.

## 2018-02-19 NOTE — PROGRESS NOTES
Feels well overall. Here today with Leoncio.  They enjoyed the weekend. Went to the movies, dinner, and to a party.   Fetal movement: positive    BPP:  8/8,  ALINA:  11.55  Denies vaginal bleeding/lof, no contractions  Warning signs reviewed   Labor signs and symptoms discussed, aware of numbers to call  Denies s/sx of preeclampsia and aware of what to report  Induction of labor discussed and recommended by 42 weeks  Induction of labor scheduled:  Yes  Date/Time for induction of labor:  Tuesday 2/20/2018 @ 1930. Midwife marvin notified.    Russ Score:  Dilation of Cervix:  1-2     Score = 1  Effacement:  40-50%;   Score = 1  Consistency:  Soft;   Score = 2  Position:  Anterior;   Score = 2  Station:  -2;   Score = 1  Total Russ Score:  6    Membrane sweep done today.    Cervical ripening needed:  Yes  Discussed possible methods cervical ripening, risks/benefits and expectations.     Yandy Presley, SUZETTE, APRN, CNM

## 2018-02-20 ENCOUNTER — HOSPITAL ENCOUNTER (INPATIENT)
Facility: CLINIC | Age: 28
LOS: 2 days | Discharge: HOME | End: 2018-02-22
Attending: ADVANCED PRACTICE MIDWIFE | Admitting: ADVANCED PRACTICE MIDWIFE
Payer: COMMERCIAL

## 2018-02-20 ENCOUNTER — ANESTHESIA EVENT (OUTPATIENT)
Dept: OBGYN | Facility: CLINIC | Age: 28
End: 2018-02-20
Payer: COMMERCIAL

## 2018-02-20 ENCOUNTER — ANESTHESIA (OUTPATIENT)
Dept: OBGYN | Facility: CLINIC | Age: 28
End: 2018-02-20
Payer: COMMERCIAL

## 2018-02-20 ENCOUNTER — NURSE TRIAGE (OUTPATIENT)
Dept: NURSING | Facility: CLINIC | Age: 28
End: 2018-02-20

## 2018-02-20 PROBLEM — Z36.89 ENCOUNTER FOR TRIAGE IN PREGNANT PATIENT: Status: ACTIVE | Noted: 2018-02-20

## 2018-02-20 PROBLEM — O26.03: Status: ACTIVE | Noted: 2018-02-20

## 2018-02-20 LAB
ABO + RH BLD: NORMAL
ABO + RH BLD: NORMAL
BASOPHILS # BLD AUTO: 0 10E9/L (ref 0–0.2)
BASOPHILS NFR BLD AUTO: 0.1 %
DIFFERENTIAL METHOD BLD: ABNORMAL
EOSINOPHIL # BLD AUTO: 0.1 10E9/L (ref 0–0.7)
EOSINOPHIL NFR BLD AUTO: 0.6 %
ERYTHROCYTE [DISTWIDTH] IN BLOOD BY AUTOMATED COUNT: 16.1 % (ref 10–15)
HCT VFR BLD AUTO: 39.9 % (ref 35–47)
HGB BLD-MCNC: 14 G/DL (ref 11.7–15.7)
IMM GRANULOCYTES # BLD: 0.1 10E9/L (ref 0–0.4)
IMM GRANULOCYTES NFR BLD: 0.6 %
LYMPHOCYTES # BLD AUTO: 1.8 10E9/L (ref 0.8–5.3)
LYMPHOCYTES NFR BLD AUTO: 18.3 %
MCH RBC QN AUTO: 31.5 PG (ref 26.5–33)
MCHC RBC AUTO-ENTMCNC: 35.1 G/DL (ref 31.5–36.5)
MCV RBC AUTO: 90 FL (ref 78–100)
MONOCYTES # BLD AUTO: 0.6 10E9/L (ref 0–1.3)
MONOCYTES NFR BLD AUTO: 6 %
NEUTROPHILS # BLD AUTO: 7.1 10E9/L (ref 1.6–8.3)
NEUTROPHILS NFR BLD AUTO: 74.4 %
NRBC # BLD AUTO: 0 10*3/UL
NRBC BLD AUTO-RTO: 0 /100
PLATELET # BLD AUTO: 157 10E9/L (ref 150–450)
RBC # BLD AUTO: 4.45 10E12/L (ref 3.8–5.2)
SPECIMEN EXP DATE BLD: NORMAL
T PALLIDUM IGG+IGM SER QL: NEGATIVE
WBC # BLD AUTO: 9.6 10E9/L (ref 4–11)

## 2018-02-20 PROCEDURE — 0KQM0ZZ REPAIR PERINEUM MUSCLE, OPEN APPROACH: ICD-10-PCS | Performed by: ADVANCED PRACTICE MIDWIFE

## 2018-02-20 PROCEDURE — 25000125 ZZHC RX 250: Performed by: ANESTHESIOLOGY

## 2018-02-20 PROCEDURE — 12000037 ZZH R&B POSTPARTUM INTERMEDIATE

## 2018-02-20 PROCEDURE — 10907ZC DRAINAGE OF AMNIOTIC FLUID, THERAPEUTIC FROM PRODUCTS OF CONCEPTION, VIA NATURAL OR ARTIFICIAL OPENING: ICD-10-PCS | Performed by: ADVANCED PRACTICE MIDWIFE

## 2018-02-20 PROCEDURE — 25000128 H RX IP 250 OP 636: Performed by: ANESTHESIOLOGY

## 2018-02-20 PROCEDURE — 86901 BLOOD TYPING SEROLOGIC RH(D): CPT | Performed by: ADVANCED PRACTICE MIDWIFE

## 2018-02-20 PROCEDURE — 88307 TISSUE EXAM BY PATHOLOGIST: CPT | Performed by: ADVANCED PRACTICE MIDWIFE

## 2018-02-20 PROCEDURE — 37000011 ZZH ANESTHESIA WARD SERVICE

## 2018-02-20 PROCEDURE — G0463 HOSPITAL OUTPT CLINIC VISIT: HCPCS

## 2018-02-20 PROCEDURE — 59410 OBSTETRICAL CARE: CPT | Performed by: ADVANCED PRACTICE MIDWIFE

## 2018-02-20 PROCEDURE — 86780 TREPONEMA PALLIDUM: CPT | Performed by: ADVANCED PRACTICE MIDWIFE

## 2018-02-20 PROCEDURE — 72200001 ZZH LABOR CARE VAGINAL DELIVERY SINGLE

## 2018-02-20 PROCEDURE — 27110038 ZZH RX 271: Performed by: ANESTHESIOLOGY

## 2018-02-20 PROCEDURE — 25000128 H RX IP 250 OP 636: Performed by: ADVANCED PRACTICE MIDWIFE

## 2018-02-20 PROCEDURE — 36415 COLL VENOUS BLD VENIPUNCTURE: CPT | Performed by: ADVANCED PRACTICE MIDWIFE

## 2018-02-20 PROCEDURE — 88307 TISSUE EXAM BY PATHOLOGIST: CPT | Mod: 26 | Performed by: ADVANCED PRACTICE MIDWIFE

## 2018-02-20 PROCEDURE — 25000132 ZZH RX MED GY IP 250 OP 250 PS 637: Performed by: ADVANCED PRACTICE MIDWIFE

## 2018-02-20 PROCEDURE — 00HU33Z INSERTION OF INFUSION DEVICE INTO SPINAL CANAL, PERCUTANEOUS APPROACH: ICD-10-PCS | Performed by: ANESTHESIOLOGY

## 2018-02-20 PROCEDURE — 25000125 ZZHC RX 250: Performed by: ADVANCED PRACTICE MIDWIFE

## 2018-02-20 PROCEDURE — 85025 COMPLETE CBC W/AUTO DIFF WBC: CPT | Performed by: ADVANCED PRACTICE MIDWIFE

## 2018-02-20 PROCEDURE — 86900 BLOOD TYPING SEROLOGIC ABO: CPT | Performed by: ADVANCED PRACTICE MIDWIFE

## 2018-02-20 PROCEDURE — 3E0R3BZ INTRODUCTION OF ANESTHETIC AGENT INTO SPINAL CANAL, PERCUTANEOUS APPROACH: ICD-10-PCS | Performed by: ANESTHESIOLOGY

## 2018-02-20 RX ORDER — OXYCODONE AND ACETAMINOPHEN 5; 325 MG/1; MG/1
1 TABLET ORAL
Status: DISCONTINUED | OUTPATIENT
Start: 2018-02-20 | End: 2018-02-20

## 2018-02-20 RX ORDER — OXYTOCIN/0.9 % SODIUM CHLORIDE 30/500 ML
340 PLASTIC BAG, INJECTION (ML) INTRAVENOUS CONTINUOUS PRN
Status: DISCONTINUED | OUTPATIENT
Start: 2018-02-20 | End: 2018-02-22 | Stop reason: HOSPADM

## 2018-02-20 RX ORDER — BISACODYL 10 MG
10 SUPPOSITORY, RECTAL RECTAL DAILY PRN
Status: DISCONTINUED | OUTPATIENT
Start: 2018-02-22 | End: 2018-02-22 | Stop reason: HOSPADM

## 2018-02-20 RX ORDER — LIDOCAINE HYDROCHLORIDE AND EPINEPHRINE 15; 5 MG/ML; UG/ML
3 INJECTION, SOLUTION EPIDURAL
Status: COMPLETED | OUTPATIENT
Start: 2018-02-20 | End: 2018-02-20

## 2018-02-20 RX ORDER — FENTANYL CITRATE 50 UG/ML
50-100 INJECTION, SOLUTION INTRAMUSCULAR; INTRAVENOUS
Status: DISCONTINUED | OUTPATIENT
Start: 2018-02-20 | End: 2018-02-20

## 2018-02-20 RX ORDER — ACETAMINOPHEN 325 MG/1
650 TABLET ORAL EVERY 4 HOURS PRN
Status: DISCONTINUED | OUTPATIENT
Start: 2018-02-20 | End: 2018-02-20

## 2018-02-20 RX ORDER — ACETAMINOPHEN 325 MG/1
650 TABLET ORAL EVERY 4 HOURS PRN
Status: DISCONTINUED | OUTPATIENT
Start: 2018-02-20 | End: 2018-02-22 | Stop reason: HOSPADM

## 2018-02-20 RX ORDER — OXYTOCIN 10 [USP'U]/ML
10 INJECTION, SOLUTION INTRAMUSCULAR; INTRAVENOUS
Status: DISCONTINUED | OUTPATIENT
Start: 2018-02-20 | End: 2018-02-22 | Stop reason: HOSPADM

## 2018-02-20 RX ORDER — OXYTOCIN/0.9 % SODIUM CHLORIDE 30/500 ML
100 PLASTIC BAG, INJECTION (ML) INTRAVENOUS CONTINUOUS
Status: DISCONTINUED | OUTPATIENT
Start: 2018-02-20 | End: 2018-02-22 | Stop reason: HOSPADM

## 2018-02-20 RX ORDER — NALBUPHINE HYDROCHLORIDE 10 MG/ML
2.5-5 INJECTION, SOLUTION INTRAMUSCULAR; INTRAVENOUS; SUBCUTANEOUS EVERY 6 HOURS PRN
Status: DISCONTINUED | OUTPATIENT
Start: 2018-02-20 | End: 2018-02-20

## 2018-02-20 RX ORDER — LANOLIN 100 %
OINTMENT (GRAM) TOPICAL
Status: DISCONTINUED | OUTPATIENT
Start: 2018-02-20 | End: 2018-02-22 | Stop reason: HOSPADM

## 2018-02-20 RX ORDER — NALOXONE HYDROCHLORIDE 0.4 MG/ML
.1-.4 INJECTION, SOLUTION INTRAMUSCULAR; INTRAVENOUS; SUBCUTANEOUS
Status: DISCONTINUED | OUTPATIENT
Start: 2018-02-20 | End: 2018-02-22 | Stop reason: HOSPADM

## 2018-02-20 RX ORDER — ROPIVACAINE HYDROCHLORIDE 2 MG/ML
10 INJECTION, SOLUTION EPIDURAL; INFILTRATION; PERINEURAL ONCE
Status: COMPLETED | OUTPATIENT
Start: 2018-02-20 | End: 2018-02-20

## 2018-02-20 RX ORDER — MISOPROSTOL 200 UG/1
400 TABLET ORAL
Status: DISCONTINUED | OUTPATIENT
Start: 2018-02-20 | End: 2018-02-22 | Stop reason: HOSPADM

## 2018-02-20 RX ORDER — LIDOCAINE HYDROCHLORIDE 20 MG/ML
INJECTION, SOLUTION EPIDURAL; INFILTRATION; INTRACAUDAL; PERINEURAL
Status: DISCONTINUED
Start: 2018-02-20 | End: 2018-02-20 | Stop reason: HOSPADM

## 2018-02-20 RX ORDER — ONDANSETRON 2 MG/ML
4 INJECTION INTRAMUSCULAR; INTRAVENOUS EVERY 6 HOURS PRN
Status: DISCONTINUED | OUTPATIENT
Start: 2018-02-20 | End: 2018-02-20

## 2018-02-20 RX ORDER — HYDROCODONE BITARTRATE AND ACETAMINOPHEN 5; 325 MG/1; MG/1
1 TABLET ORAL EVERY 4 HOURS PRN
Status: DISCONTINUED | OUTPATIENT
Start: 2018-02-20 | End: 2018-02-22 | Stop reason: HOSPADM

## 2018-02-20 RX ORDER — IBUPROFEN 400 MG/1
800 TABLET, FILM COATED ORAL
Status: DISCONTINUED | OUTPATIENT
Start: 2018-02-20 | End: 2018-02-20

## 2018-02-20 RX ORDER — SODIUM CHLORIDE, SODIUM LACTATE, POTASSIUM CHLORIDE, CALCIUM CHLORIDE 600; 310; 30; 20 MG/100ML; MG/100ML; MG/100ML; MG/100ML
INJECTION, SOLUTION INTRAVENOUS CONTINUOUS
Status: DISCONTINUED | OUTPATIENT
Start: 2018-02-20 | End: 2018-02-20

## 2018-02-20 RX ORDER — NALOXONE HYDROCHLORIDE 0.4 MG/ML
.1-.4 INJECTION, SOLUTION INTRAMUSCULAR; INTRAVENOUS; SUBCUTANEOUS
Status: DISCONTINUED | OUTPATIENT
Start: 2018-02-20 | End: 2018-02-20

## 2018-02-20 RX ORDER — DOCUSATE SODIUM 100 MG/1
100 CAPSULE, LIQUID FILLED ORAL 2 TIMES DAILY
Status: DISCONTINUED | OUTPATIENT
Start: 2018-02-20 | End: 2018-02-22 | Stop reason: HOSPADM

## 2018-02-20 RX ORDER — ONDANSETRON 4 MG/1
4 TABLET, ORALLY DISINTEGRATING ORAL EVERY 6 HOURS PRN
Status: DISCONTINUED | OUTPATIENT
Start: 2018-02-20 | End: 2018-02-20

## 2018-02-20 RX ORDER — EPHEDRINE SULFATE 50 MG/ML
5 INJECTION, SOLUTION INTRAMUSCULAR; INTRAVENOUS; SUBCUTANEOUS
Status: DISCONTINUED | OUTPATIENT
Start: 2018-02-20 | End: 2018-02-20

## 2018-02-20 RX ORDER — HYDROCORTISONE 2.5 %
CREAM (GRAM) TOPICAL 3 TIMES DAILY PRN
Status: DISCONTINUED | OUTPATIENT
Start: 2018-02-20 | End: 2018-02-22 | Stop reason: HOSPADM

## 2018-02-20 RX ORDER — CARBOPROST TROMETHAMINE 250 UG/ML
250 INJECTION, SOLUTION INTRAMUSCULAR
Status: DISCONTINUED | OUTPATIENT
Start: 2018-02-20 | End: 2018-02-20

## 2018-02-20 RX ORDER — OXYTOCIN 10 [USP'U]/ML
10 INJECTION, SOLUTION INTRAMUSCULAR; INTRAVENOUS
Status: DISCONTINUED | OUTPATIENT
Start: 2018-02-20 | End: 2018-02-20

## 2018-02-20 RX ORDER — IBUPROFEN 400 MG/1
800 TABLET, FILM COATED ORAL EVERY 6 HOURS PRN
Status: DISCONTINUED | OUTPATIENT
Start: 2018-02-20 | End: 2018-02-22 | Stop reason: HOSPADM

## 2018-02-20 RX ORDER — METHYLERGONOVINE MALEATE 0.2 MG/ML
200 INJECTION INTRAVENOUS
Status: DISCONTINUED | OUTPATIENT
Start: 2018-02-20 | End: 2018-02-20

## 2018-02-20 RX ORDER — METHYLERGONOVINE MALEATE 0.2 MG/ML
200 INJECTION INTRAVENOUS
Status: DISCONTINUED | OUTPATIENT
Start: 2018-02-20 | End: 2018-02-22 | Stop reason: HOSPADM

## 2018-02-20 RX ORDER — OXYTOCIN/0.9 % SODIUM CHLORIDE 30/500 ML
100-340 PLASTIC BAG, INJECTION (ML) INTRAVENOUS CONTINUOUS PRN
Status: COMPLETED | OUTPATIENT
Start: 2018-02-20 | End: 2018-02-20

## 2018-02-20 RX ADMIN — SODIUM CHLORIDE, POTASSIUM CHLORIDE, SODIUM LACTATE AND CALCIUM CHLORIDE 250 ML: 600; 310; 30; 20 INJECTION, SOLUTION INTRAVENOUS at 17:02

## 2018-02-20 RX ADMIN — IBUPROFEN 800 MG: 400 TABLET ORAL at 19:34

## 2018-02-20 RX ADMIN — LIDOCAINE HYDROCHLORIDE AND EPINEPHRINE 3 ML: 15; 5 INJECTION, SOLUTION EPIDURAL at 10:18

## 2018-02-20 RX ADMIN — SODIUM CHLORIDE, POTASSIUM CHLORIDE, SODIUM LACTATE AND CALCIUM CHLORIDE 1000 ML: 600; 310; 30; 20 INJECTION, SOLUTION INTRAVENOUS at 09:48

## 2018-02-20 RX ADMIN — ACETAMINOPHEN 650 MG: 325 TABLET, FILM COATED ORAL at 19:34

## 2018-02-20 RX ADMIN — FENTANYL CITRATE 100 MCG: 50 INJECTION, SOLUTION INTRAMUSCULAR; INTRAVENOUS at 08:40

## 2018-02-20 RX ADMIN — SODIUM CHLORIDE, POTASSIUM CHLORIDE, SODIUM LACTATE AND CALCIUM CHLORIDE 125 ML/HR: 600; 310; 30; 20 INJECTION, SOLUTION INTRAVENOUS at 10:38

## 2018-02-20 RX ADMIN — ROPIVACAINE HYDROCHLORIDE 10 ML: 2 INJECTION, SOLUTION EPIDURAL; INFILTRATION at 10:23

## 2018-02-20 RX ADMIN — ONDANSETRON 4 MG: 2 INJECTION INTRAMUSCULAR; INTRAVENOUS at 09:45

## 2018-02-20 RX ADMIN — Medication 12 ML/HR: at 10:26

## 2018-02-20 RX ADMIN — OXYTOCIN-SODIUM CHLORIDE 0.9% IV SOLN 30 UNIT/500ML 340 ML/HR: 30-0.9/5 SOLUTION at 18:24

## 2018-02-20 RX ADMIN — DOCUSATE SODIUM 100 MG: 100 CAPSULE, LIQUID FILLED ORAL at 23:16

## 2018-02-20 RX ADMIN — FENTANYL CITRATE 100 MCG: 50 INJECTION, SOLUTION INTRAMUSCULAR; INTRAVENOUS at 07:26

## 2018-02-20 RX ADMIN — ACETAMINOPHEN 650 MG: 325 TABLET, FILM COATED ORAL at 23:15

## 2018-02-20 RX ADMIN — SODIUM CHLORIDE, POTASSIUM CHLORIDE, SODIUM LACTATE AND CALCIUM CHLORIDE: 600; 310; 30; 20 INJECTION, SOLUTION INTRAVENOUS at 07:01

## 2018-02-20 NOTE — H&P
CNM Labor Admission History & Physical    Haritha Bateman is a 27 year old  with an IUP at 41w5d  Somalian; ,   Partner/support Person: Leoncio  Language: English  Clinic: [facility]  Provider: CNM's    Haritha Bateman is admitted to the Birthplace at [facility] on 2018 at 5:00 AM       History of present inllness/Chief Complaint:    Here with: spontaneous onset of labor. Haritha states that contractions began after a membrane sweep in clinic yesterday.  They continued throughout the day and became more intense at approximately 0300 this morning.    Patient reports contractions are Regular           Baby active Yes  Membranes are intact.  Bloody show No   Any changes with medical history since last prenatal visit No      Obstetrical history  Estimated Date of Delivery: 2018 determined by LMP, confirmed with first trimester US.  Patient's last menstrual period was 2017.   Dating U/S: 17    Fetal anatomic survey: Normal  Placenta: Posterior    PRENATAL COURSE  Haritha was a transfer of care to [facility] at 20 wks gestation for a total of 15 prenatal visits at [facility].  Total wt gain 51lb; Body mass index is 31.45 kg/(m^2).  Prenatal Blood Pressure: WNL  Prenatal course was complicated by anemia requiring IV iron transfusions, excessive weight gain affecting pregnancy.  Tdap: 17  Rhogam: N/A, blood type B+    Patient Active Problem List   Diagnosis     Supervision of high risk pregnancy in third trimester     Anemia complicating pregnancy in third trimester     Encounter for triage in pregnant patient     Indication for care in labor or delivery       HISTORY  No Known Allergies  Past Medical History:   Diagnosis Date     NO ACTIVE PROBLEMS 2017     Past Surgical History:   Procedure Laterality Date     NO HISTORY OF SURGERY  2017     Family History   Problem Relation Age of Onset     Hypertension Mother      Hypertension  "Paternal Grandmother      Other Cancer Paternal Grandmother      Social History   Substance Use Topics     Smoking status: Never Smoker     Smokeless tobacco: Never Used     Alcohol use No     Obstetric History       T0      L0     SAB0   TAB0   Ectopic0   Multiple0   Live Births0       # Outcome Date GA Lbr Delfin/2nd Weight Sex Delivery Anes PTL Lv   1 Current                   LABS:  Lab Results   Component Value Date    ABO B 2018    RH Pos 2018    AS Neg 2017    HGB 14.0 2018    HEPBANG Non-reactive 2017    CHPCRT Negative 2017    GCPCRT Negative 2017    TREPAB Negative 2017    RUBELLAABIGG 8.15 2017       GBS Status:   Lab Results   Component Value Date    GBS Negative 2018     Rubella: Immune    HIV: Non-Reactive   Platelets:  236 on 17; 157 upon admission to L+D  1hr GCT:  103    ROS   Pt is alert and oriented  Pt denies significant constitutional symptoms including fever and/or malaise.    Pt denies significant respiratory, cardiovacular, GI, or muscular/skeletal complaints.    Neuro: Denies HA and visual changes  Muscoloskeletal: Denies except for discomforts r/t pregnancy     PHYSICAL EXAM:  Ht 1.651 m (5' 5\")  Wt 85.7 kg (189 lb)  LMP 2017  BMI 31.45 kg/m2  General appearance:  healthy, alert, active and no distress   Heart: RRR  Lungs: CTA bilaterally, normal respiratory effort  Abdomen: gravid, single vertex fetus, non-tender, EFW 7.5 lbs.   Legs:  trace edema     Contractions: Pt is bennie every 3-5 minutes, lasting 50-80 seconds and palpates moderate    Fetal heart tones: Baseline 120   Variability: moderate   Accelerations: two 15x15 accelerations  Decelerations: absent    NST: reactive    Cervix: 3/80%/-1, Vtx per MAC RN exam  Bloody show: no  Membranes:  Intact    ASSESSMENT:  27 year old  with floyd IUP 41w5d in early labor  NST Reactive  GBS negative and membranes intact      PLAN:  Routine CNM " care  Labs ordered: Hemoglobin/Platelets and type and hold  Teaching done r/t comfort measures, pain management options, and labor processes, and   Admit - see IP orders  Pain medication as requested; received one dose of Fentanyl at 0726  Anticipate     ABIMAEL CastellonM

## 2018-02-20 NOTE — PROGRESS NOTES
"CNM PROGRESS NOTE    SUBJECTIVE:  Haritha continued to labor in bed, becoming more uncomfortable with contractions.  She requested an epidural and it was placed at approximately 1015.    OBJECTIVE:  /70  Temp 97.9  F (36.6  C)  Ht 1.651 m (5' 5\")  Wt 85.7 kg (189 lb)  LMP 2017  BMI 31.45 kg/m2     Cervical exam at 0820 was essentially unchanged from MAC, 3cm/80%/0. Haritha was comfortable through contractions at that time after receiving a dose of Fentanyl.  Discussed rechecking cervix in 2 hours vs AROM vs starting Pitocin.  Haritha requests AROM, r/b/a discussed.  Membranes ruptured at 0820 after patient consent for moderate amount of meconium fluid.      Fetal heart tones: Baseline 120  Variability: moderate. Some periods of minimal after fentanyl administration.  Accelerations: absent  Decelerations: absent.  Early decelerations present prior to epidural placement.    Contractions: Pt is bennie every 2-3.5 minutes, lasting 60-80 seconds and palpates moderate    Cervix: 3.5/ 90% / 0, Vtx  ROM: meconium fluid at 0820    Pitocin- none  Antibiotics- none  Cervical ripening: N/A    ASSESSMENT:  IUP @ 41w5d minimal/no progress   GBS- negative  Membranes ruptured for 2.5 hours; meconium fluid  Epidural anesthesia    PLAN:   Peanut ball in place  Anticipate   Plan for NNP to be present for delivery  Reevaluate in 2 hours. If no cervical change, consider Pitocin augmentation.    ABIMAEL Castellon CNM    "

## 2018-02-20 NOTE — PROGRESS NOTES
0535  Assumed care of patient. , 41+5 weeks gestation.  Patient here in labor.  VSS,  Denies leaking fluid and states her contractions are in her back. Positive fetal movement per patient. Plan of care reviewed with patient and spouse, understanding verbalized. 0600 Patient declining IV at this time and states she wants to wait for IV start.  Minimal variability noted 0659  IV started and O2 applied.  Report given to Sri JOY RN.

## 2018-02-20 NOTE — ANESTHESIA PROCEDURE NOTES
Peripheral nerve/Neuraxial procedure note : epidural catheter  Pre-Procedure  Performed by LAVINIA FINE  Location: OB      Pre-Anesthestic Checklist: patient identified, IV checked, risks and benefits discussed, informed consent, monitors and equipment checked, pre-op evaluation and at physician/surgeon's request    Timeout  Correct Patient: Yes   Correct Procedure: Yes   Correct Site: Yes   Correct Laterality: N/A   Correct Position: Yes   Site Marked: N/A   .   Procedure Documentation    .    Procedure:    Epidural catheter.  Insertion Site:L4-5  (midline approach) Injection technique: LORT saline   Local skin infiltrated with mL of 1% lidocaine.  EVANGELINA at 5 cm     Patient Prep;mask, sterile gloves, povidone-iodine 7.5% surgical scrub, patient draped.  .  Needle: Touhy needle Needle Gauge: 17.    Needle Length (Inches) 3.5  # of attempts: 1 and # of redirects:  .   . .  Catheter threaded easily  .  10 cm at skin.   .    Assessment/Narrative  Paresthesias: No.  .  .  Aspiration negative for heme or CSF  . Test dose of 3 mL lidocaine 1.5% w/ 1:200,000 epinephrine at. Test dose negative for signs of intravascular, subdural or intrathecal injection. Comments:  No complications.  Catheter secured with sterile dressing and tape.  Questions answered.

## 2018-02-20 NOTE — IP AVS SNAPSHOT
MRN:[id number]                      After Visit Summary   2/20/2018    Haritha Bateman    MRN: [id number]           Thank you!     Thank you for choosing [facility] for your care. Our goal is always to provide you with excellent care. Hearing back from our patients is one way we can continue to improve our services. Please take a few minutes to complete the written survey that you may receive in the mail after you visit with us. Thank you!        Patient Information     Date Of Birth          1990        About your hospital stay     You were admitted on:  February 20, 2018 You last received care in the:  [address]    You were discharged on:  February 22, 2018        Reason for your hospital stay       Maternity care                  Who to Call     For medical emergencies, please call 911.  For non-urgent questions about your medical care, please call your primary care provider or clinic, None          Attending Provider     Provider Specialty    Juana Hollis APRN CNM [facility]    Romi Goldsmith APRN CNM OB/Gyn       Primary Care Provider Fax #    Physician No Ref-Primary [phone]      After Care Instructions     Activity       Review discharge instructions            Diet       Resume previous diet            Discharge Instructions - Postpartum visit       Schedule postpartum visit with your provider and return to clinic in 6 weeks.                  Further instructions from your care team       Postpartum Vaginal Delivery Instructions    Activity       Ask family and friends for help when you need it.    Do not place anything in your vagina for 6 weeks.    You are not restricted on other activities, but take it easy for a few weeks to allow your body to recover from delivery.  You are able to do any activities you feel up to that point.    No driving until you have stopped taking your pain medications (usually two weeks after delivery).     Call  your health care provider if you have any of these symptoms:       Increased pain, swelling, redness, or fluid around your stiches from an episiotomy or perineal tear.    A fever above 100.4 F (38 C) with or without chills when placing a thermometer under your tongue.    You soak a sanitary pad with blood within 1 hour, or you see blood clots larger than a golf ball.    Bleeding that lasts more than 6 weeks.    Vaginal discharge that smells bad.    Severe pain, cramping or tenderness in your lower belly area.    A need to urinate more frequently (use the toilet more often), more urgently (use the toilet very quickly), or it burns when you urinate.    Nausea and vomiting.    Redness, swelling or pain around a vein in your leg.    Problems breastfeeding or a red or painful area on your breast.    Chest pain and cough or are gasping for air.    Problems coping with sadness, anxiety, or depression.  If you have any concerns about hurting yourself or the baby, call your provider immediately.     You have questions or concerns after you return home.     Keep your hands clean:  Always wash your hands before touching your perineal area and stitches.  This helps reduce your risk of infection.  If your hands aren't dirty, you may use an alcohol hand-rub to clean your hands. Keep your nails clean and short.        Pending Results     Date and Time Order Name Status Description    2/20/2018 1934 Placenta Path Order and Indications (PLACENTA) In process             Statement of Approval     Ordered          02/22/18 1131  I have reviewed and agree with all the recommendations and orders detailed in this document.  EFFECTIVE NOW     Approved and electronically signed by:  Elinor Buck APRN CNM             Admission Information     Date & Time Provider Department Dept. Phone    2/20/2018 Romi Goldsmith APRN CNM [address] [phone]      Your Vitals Were     Blood Pressure  "Pulse Temperature Respirations Height Weight    112/67 (BP Location: Left arm) 97 98.1  F (36.7  C) (Oral) 16 1.651 m (5' 5\") 85.7 kg (189 lb)    Last Period Pulse Oximetry BMI (Body Mass Index)             05/04/2017 98% 31.45 kg/m2         Cardiah[employer] Information     [employer] gives you secure access to your electronic health record. If you see a primary care provider, you can also send messages to your care team and make appointments. If you have questions, please call your primary care clinic.  If you do not have a primary care provider, please call [phone] and they will assist you.        Care EveryWhere ID     This is your Care EveryWhere ID. This could be used by other organizations to access your [facility] medical records  [id number]        Equal Access to Services     ADEOLA IBRAHIM : Nieves Hamilton, falguni smith, deb he, cayetano snyder. So [facility] [phone].    ATENCIÓN: Si habla español, tiene a mishra disposición servicios gratuitos de asistencia lingüística. Sherin al [phone].    We comply with applicable federal civil rights laws and [place] laws. We do not discriminate on the basis of race, color, national origin, age, disability, sex, sexual orientation, or gender identity.               Review of your medicines      START taking        Dose / Directions    ibuprofen 200 MG tablet   Commonly known as:  ADVIL/MOTRIN        Dose:  600-800 mg   Take 3-4 tablets (600-800 mg) by mouth every 8 hours as needed for pain   Quantity:  60 tablet   Refills:  1         CONTINUE these medicines which have NOT CHANGED        Dose / Directions    * order for DME   Used for:  Low back pain during pregnancy in third trimester        Equipment being ordered: One pregnancy support belt for low back pain in pregnancy.   Quantity:  1 Device   Refills:  0       * order for DME   Used for:  Lactating mother, Breastfeeding (infant)        Equipment being " ordered: One standard double electric breast pump with accessories, to be used for 99 years   Quantity:  1 Device   Refills:  0       PRENATAL PLUS 27-1 MG Tabs   Used for:  Supervision of high risk pregnancy in third trimester, 33 weeks gestation of pregnancy        Dose:  1 tablet   Take 1 tablet by mouth daily   Quantity:  100 tablet   Refills:  3       * Notice:  This list has 2 medication(s) that are the same as other medications prescribed for you. Read the directions carefully, and ask your doctor or other care provider to review them with you.      STOP taking     IRON SUPPLEMENT PO           VITAMIN B 12 PO           VITAMIN C PO                Where to get your medicines      These medications were sent to [facility] [id number] [place] [address], [place] - [address] AT [facility]  [address], [facility] [address]     Phone:  [phone]     ibuprofen 200 MG tablet                Protect others around you: Learn how to safely use, store and throw away your medicines at www.disposemymeds.org.             Medication List: This is a list of all your medications and when to take them. Check marks below indicate your daily home schedule. Keep this list as a reference.      Medications           Morning Afternoon Evening Bedtime As Needed    ibuprofen 200 MG tablet   Commonly known as:  ADVIL/MOTRIN   Take 3-4 tablets (600-800 mg) by mouth every 8 hours as needed for pain   Last time this was given:  800 mg on 2/22/2018  7:17 AM                                * order for DME   Equipment being ordered: One pregnancy support belt for low back pain in pregnancy.                                * order for DME   Equipment being ordered: One standard double electric breast pump with accessories, to be used for 99 years                                PRENATAL PLUS 27-1 MG Tabs   Take 1 tablet by mouth daily                                * Notice:  This list has 2  medication(s) that are the same as other medications prescribed for you. Read the directions carefully, and ask your doctor or other care provider to review them with you.              More Information        After a Vaginal Birth  After having a baby, your body may be very tired. It can take time to recover from a vaginal delivery. You may stay in the hospital or birth center from 1 to 4 days. In some cases, you may be able to go home the same day.    Right after the delivery  Your temperature and blood pressure will be taken until they are stable. A nurse or other healthcare provider will observe you as you rest. You may have afterbirth pains. These are cramps caused by the uterus shrinking. Sanitary pads are used to soak up the discharge of the uterine lining. To make sure that you aren t bleeding too much, the pad will be checked. And the firmness of your uterus will be checked. To do this, a nurse will gently push down on your stomach. If you had anesthesia, you ll be watched closely until you can feel and move your toes. If you have perineal pain (pain between the vagina and anus), an ice pack can help.   care  While still in the hospital or birth center, you ll learn how to hold and feed your baby. You will also be given instructions on how to care for your baby. This includes bathing and feeding.   Preparing to go home  You may be anxious to go home as soon as possible. Before you and your baby go home, a healthcare provider will check to be sure you are healthy enough to take care of your baby and yourself. You re ready to go home when:    You can walk to the bathroom and use the bathroom without help.    You can eat solid food and swallow pills (if needed).    You have no sign of infection or other health problems, including fever.     You have adequate pain control.    Your bleeding isn't excessive.    You are able to care for your  and are emotionally stable.  Before leaving the hospital or  birth center, you ll be given written instructions for home self-care after vaginal delivery. Be sure to follow these instructions carefully. If you have questions or concerns, talk about them now.  If you have stitches  You may have received stitches in the skin near your vagina. The stitches might have closed an episiotomy (an incision that enlarges the opening of the vagina). Or you may have needed stitches to repair torn skin. Either way, your stitches should dissolve within weeks. Until then, you can help reduce discomfort, aid healing, and reduce your risk of infection by keeping the stitches clean. These tips can help:    Gently wipe from front to back after you urinate or have a bowel movement.    After wiping, spray warm water on the area. Or you can have a sitz bath. This means sitting in a tub with a few inches of water in it. Then pat the area dry or use a hairdryer on a cool setting.    Do not use soap or any solution except water on the area.    You can take a shower unless told not to.    Change sanitary pads at least every 2 to 4 hours.    Place cold or heat packs on the area as directed by your healthcare providers or nurses. Keep a thin towel between the pack and your skin.    Sit on firm seats so the stitches pull less.   follow-up  Schedule a  follow-up exam with your healthcare provider for about 6 weeks after delivery. During this exam, your uterus and vaginal area will be checked. Contact your healthcare provider if you think you or your baby are having any problems.  When to call your healthcare provider  Call your health care provider right away if you have:    A fever of 100.4 F (38.0 C) or higher    Bleeding that requires a new sanitary pad after an hour, or large blood clots    Pain in your vagina that gets worse and isn't relieved with medicine    Swelling, discharge, or increased pain from vaginal tear or episiotomy    Burning, pain, red streaks, or lumpy areas in your  breasts that may be accompanied by flu-like symptoms    Cracks, blisters, or blood on your nipples    Burning or pain when you urinate    Nausea or vomiting    Dizziness or fainting    Feelings of extreme sadness or anxiety, or a feeling that you don t want to be with your baby    Abdominal pain that isn t relieved with medicine    Vaginal discharge that has a bad odor    No bowel movement for 5 days    Painful urination, orinability to control urination    Redness, warmth, or pain in the lower leg    Chest pain   Date Last Reviewed: 8/2/2015 2000-2017 [employer]. [address] [place] [address]. All rights reserved. This information is not intended as a substitute for professional medical care. Always follow your healthcare professional's instructions.                After Giving Birth: How to Feel Healthy    Helping yourself feel fit is one of the best things you can do for your baby. A little exercise will tone your muscles. You ll feel stronger and more energized. You ll also feel more awake and aware. Don t worry about your weight right now. Your goal is to feel healthy. Part of feeling good is dressing for comfort. If you dress  smart,  you can be a busy new mom and still look great.  Continue Kegel exercises  You may have been told to do Kegel exercises during pregnancy. These exercises strengthen the muscles that are strained by carrying and delivering the baby. You can return to your Kegels as soon as you feel ready. Why not start today? Squeeze your pelvic floor muscles (the ones that control your urine stream) for at least 5 seconds. Relax, then squeeze again. Work your way up to 50 or 100 Kegels a day.  Exercise often  Exercise helps you get in shape. It also strengthens your muscles, so you are better fit for lifting the baby. As an added benefit, exercise gives you a sense that you re doing something good for yourself. Take your baby for a short walk, or spend 10 minutes  stretching. If you were active during pregnancy, you can probably begin light exercise as soon as you feel ready. But be sure to check with your healthcare provider before you begin.  Stay off the scale  For the first month, think about regaining energy and feeling good, not about losing weight. Losing weight too soon can make you feel more tired. Instead, focus on caring for your baby and eating balanced meals. You may lose some weight without even trying, especially if you re breastfeeding. Once your energy level is back to normal, you can begin to lose weight. A gradual weight loss of 4 or 5 pounds a month is safest.  Pelvic tilt  Lie on your back, with your knees bent and your feet flat on the floor. Now tighten the muscles in your belly and buttocks. As you inhale, press down until your low back flattens against the floor. Hold for a moment, then relax. Repeat 5 times twice a day.  Abdominal curl  Lie on your back with your knees bent and feet flat on the floor. Cross your arms over your chest. Exhale and tighten the muscles in your belly. Gently raise your shoulders off the floor. Hold for 5 counts. Slowly lower your shoulders. Relax, then repeat 3 to 5 times twice a day.  Dress smart  You ll want to be comfortable during the first days after delivery. Wear a robe, pajamas, or sweats -- whatever feels best. Soon you may want to look more like your prepregnant self. Do your hair and wear makeup, if you normally do. A loose-fitting dress may feel good. But do yourself a favor: Don t reach for your jeans. It s likely to be a month or more before you can wear them. If leaking breasts are a problem, put pads inside your bra and dress in layers. If you re breastfeeding, shirts that open in front or pullover tops are good choices. A scarf or shawl can be used as a drape if you breastfeed when others are present.     When to call your healthcare provider  Remember to schedule your postpartum visit. If you delivered by  , be seen within 2 weeks. For vaginal delivery, be seen 4 to 6 weeks after the birth. Also, call your healthcare provider if you have heavy bleeding, fever, redness or persistent lump in breasts, unable to void, unable to have a bowel movement after 1 week, severe pain, or worsening depression.   Date Last Reviewed: 2015-2017 The "[employer] [address] [place] [address], [place] [address]. All rights reserved. This information is not intended as a substitute for professional medical care. Always follow your healthcare professional's instructions.                Breastfeeding: Caring for Yourself  When you have a new little person in your life, it s easy to forget about yourself. There are new demands on your time. There are also new responsibilities. But it s important to take care of yourself. This will help you take better care of your new baby.     Healthy habits  Here are some healthy tips:    Get exercise when you can. If you leak milk, it will help to nurse right before the activity.    Avoid smoking. Smoking is unhealthy for you and may cause you to make less milk. Secondhand smoke is also harmful to your baby.    Talk to your healthcare provider about alcohol, if you choose to drink.    When you re sick, tell your healthcare provider that you are breastfeeding. Few medicines and illnesses affect breastfeeding, but it is important to check.    Ask your healthcare provider before taking any prescription or over-the-counter medicines, herbs, or supplements.  Comfy clothes  Suggestions for being comfortable when breastfeeding include:    Find a comfortable nursing bra. Many women find underwire uncomfortable. Some stores offer on-site fittings. Ask your healthcare provider or nurse for a referral.    If you have leaking milk, place breast pads inside your bra.    Choose an extra-supportive bra for exercise. Or you can wear two bras at the same time for more support.    Wear loose tops  that can be lifted for breastfeeding. You can also buy clothes specially made for breastfeeding moms.  A note about sex  After delivery, it may take a while before your interest in sex returns. Share your feelings with your partner. Your healthcare provider will let you know when it is safe to resume having sex. When you re ready, know that:    There are several forms of birth control that can be used while breastfeeding. Ask your healthcare provider what to use for pregnancy prevention while you are nursing.    Breastfeeding hormones may cause vaginal dryness. Some women find using a water-based lubricant makes sex more comfortable.    Milk may let down when you are aroused. Applying pressure on the nipple, using breast pads, or a towel may help with this.  When to call your healthcare provider  Call your healthcare provider if:    You feel overwhelmed and don't know where to turn.    You feel very sad or don t want to be with your baby.    You feel like your baby cries all the time and won't be soothed    You are unable to exercise, or have sex, without discomfort.    You are unsure about a medicine, illness, or activity and its effect on breastfeeding.   Date Last Reviewed: 9/7/2015 2000-2017 [employer]. [address] [place] [address]. All rights reserved. This information is not intended as a substitute for professional medical care. Always follow your healthcare professional's instructions.                Understanding Postpartum Depression  You ve just had a baby. You expected to be excited and happy. But instead you find yourself crying for no reason. You may have trouble coping with your daily tasks. You feel sad, tired, and hopeless most of the time. You may even feel ashamed or guilty. But what you re going through is not your fault and you can feel better. Talk to your healthcare provider. He or she can help.    What is depression?  Depression is a mood disorder that affects  "the way you think and feel. The most common symptom is a feeling of deep sadness. You may also feel as if you just can t cope with life. Other symptoms include:    Gaining or losing a lot of weight    Sleeping too much or too little    Feeling tired all the time    Feeling restless    Crying a lot    Having too little or too much appetite.    Withdrawing from friends and family    Having headaches, aches and pains, or stomach problems that won't go away.    Fears of harming your baby    Lack of interest in your baby    Feeling worthless or guilty    No longer finding pleasure in things you used to    Having trouble thinking clearly or making decisions    Thinking about death or suicide   Depression after childbirth  You may be weepy and tired right after giving birth. These feelings are normal. They re sometimes called the  baby blues.  These blues go away after 1 to 2 weeks. However, postpartum (meaning  after birth ) depression lasts much longer and is more severe than the \"baby blues.\" It can make you feel sad and hopeless. You may also fear that your baby will be harmed and worry about being a bad mother.  What causes postpartum depression?  The exact cause of postpartum depression is unknown. Changes in brain chemistry or structure are believed to play a big role in depression. It may be due to changes in your hormones during and after childbirth. You may also be tired from caring for your baby and adjusting to being a mother. All these factors may make you feel depressed. In some cases, your genes may also play a role.  Depression can be treated  There are many ways to treat postpartum depression. Talking to your healthcare provider is the first step toward feeling better.  When to call your healthcare provider  Call your healthcare provider if you:     Cry for no clear reason    Have trouble sleeping, eating, and making choices    Questions whether you can handle caring for a baby    Have intense feelings of " sadness, anxiety, or despair that prevent you from being able to do your daily tasks  Resources    [place] of Mental Ffatxy672-461-2335ltk.[facility].nih.gov    National [place] on Mental Eyxficl505-054-3199irb.ann.org    Mental Health Ealmhep305-639-8660gbi.[facility].org    National Suicide Xeicwdd017-284-1884 (800-SUICIDE)   Date Last Reviewed: 8/16/2015 2000-2017 [employer]. [address] [place], [place] [address]. All rights reserved. This information is not intended as a substitute for professional medical care. Always follow your healthcare professional's instructions.

## 2018-02-20 NOTE — PLAN OF CARE
Patient tearful and reporting feeling strong contractions. Anesthesia paged with call returned. Plan for rebolus.

## 2018-02-20 NOTE — TELEPHONE ENCOUNTER
"  Reason for Disposition    [1] First baby (primipara) AND [2] contractions < 6 minutes apart  AND [3] present 2 hours    Additional Information    Negative: Passed out (i.e., lost consciousness, collapsed and was not responding)    Negative: Shock suspected (e.g., cold/pale/clammy skin, too weak to stand, low BP, rapid pulse)    Negative: Difficult to awaken or acting confused  (e.g., disoriented, slurred speech)    Negative: [1] SEVERE abdominal pain (e.g., excruciating) AND [2] constant AND [3] present > 1 hour    Negative: Severe bleeding (e.g., continuous red blood from vagina, or large blood clots)    Negative: Umbilical cord hanging out of the vagina (shiny, white, curled appearance, \"like telephone cord\")    Negative: Uncontrollable urge to push (i.e., feels like baby is coming out now)    Negative: Can see baby    Negative: Sounds like a life-threatening emergency to the triager    Negative: Pregnant < 37 weeks (i.e., )    Negative: [1] Uncertain delivery date AND [2] possibly pregnant < 37 weeks (i.e., )    Answer Assessment - Initial Assessment Questions  1. ONSET: \"When did the symptoms begin?\"         yesterday  2. CONTRACTIONS: \"Describe the contractions that you are having.\" (e.g., duration, frequency, regularity, severity)      Almost constant  3. JOSEPH: \"What date are you expecting to deliver?\"      18  4. PARITY: \"Have you had a baby before?\" If yes, \"How long did the labor last?\"      first  5. FETAL MOVEMENT: \"Has the baby's movement decreased or changed significantly from normal?\"      ok  6. OTHER SYMPTOMS: \"Do you have any other symptoms?\" (e.g., leaking fluid from vagina, vaginal bleeding, fever, hand/facial swelling)      no    Protocols used: PREGNANCY - LABOR-ADULT-AH    "

## 2018-02-20 NOTE — IP AVS SNAPSHOT
[address]    [place] [address]    Phone:  [phone]                                       After Visit Summary   2/20/2018    Haritha Bateman    MRN: [id number]           After Visit Summary Signature Page     I have received my discharge instructions, and my questions have been answered. I have discussed any challenges I see with this plan with the nurse or doctor.    ..........................................................................................................................................  Patient/Patient Representative Signature      ..........................................................................................................................................  Patient Representative Print Name and Relationship to Patient    ..................................................               ................................................  Date                                            Time    ..........................................................................................................................................  Reviewed by Signature/Title    ...................................................              ..............................................  Date                                                            Time

## 2018-02-20 NOTE — PROGRESS NOTES
Juana Hollis, CNM notified of decreased variability and decels. Per CNM, she feels that decels are early decels and decreased variabilty due to Fentanyl. Tracing reviewed by CNM remotely. Will continue to monitor and update CNM as needed. O2 applied.

## 2018-02-20 NOTE — PROGRESS NOTES
SVE performed by JORDYN Hollis CNM. Pt making slow cervical change. Mec fluid. Fht's minimal variability, moderate at times. No accels, no decels.  CNM aware of tracing. No orders.

## 2018-02-20 NOTE — PLAN OF CARE
Data: Patient presented to [facility] at 0502.   Reason for maternal/fetal assessment per patient is Laboring  .  Patient is a . Prenatal record reviewed.      Obstetric History       T0      L0     SAB0   TAB0   Ectopic0   Multiple0   Live Births0       # Outcome Date GA Lbr Delfin/2nd Weight Sex Delivery Anes PTL Lv   1 Current               . Medical history:   Past Medical History:   Diagnosis Date     NO ACTIVE PROBLEMS 2017   . Gestational Age 41w5d. VSS. Fetal movement present. Patient denies backache, vaginal discharge, pelvic pressure, UTI symptoms, GI problems, bloody show, vaginal bleeding, edema, headache, visual disturbances, epigastric or URQ pain, rupture of membranes. Support persons Mohkaterin present. Patient denies problems with pregnancy other than being overdue. States contractions become stronger at 0200. Patient presented in wheelchair with  and mother. Patient moaning and  Breathing every contraction.  Action: Verbal consent for EFM. Triage assessment completed. EFM applied for fetal assessment in labor. Uterine assessment done. Fetal assessment: Presumed adequate fetal oxygenation documented (see flow record).   Response:AMY Goldsmith informed of patient arrival, contractions every 1.5-3 minutes, cervix 3 cms, 80%, -1/-2 station, GBS is negative.. Plan per provider is admit for labor, Okay for pain meds- Nitrous, Fentanyl or epidural. Olivia will let Juana Hollis know of patient's admission in a.m. . Patient verbalized agreement with plan. Patient transferred to room 232 in wheelchair, oriented to room and call light. Report given to Bobbi Richardson RN.    :  AMY advised on only acceleration noted on FHR tracing, will continue to watch.

## 2018-02-20 NOTE — PROGRESS NOTES
"CNM PROGRESS NOTE    SUBJECTIVE:  Lulah is resting comfortable in bed with the peanut ball; , mother-in-law and sister at the bedside for support.    OBJECTIVE:  /82  Temp 97.7  F (36.5  C)  Ht 1.651 m (5' 5\")  Wt 85.7 kg (189 lb)  LMP 05/04/2017  SpO2 98%  BMI 31.45 kg/m2    At 1240, Lulah was 4cm/90/0.     Fetal heart tones: Baseline 140   Variability: moderate  Accelerations: absent  Decelerations: absent    Contractions: Pt is bennie every 1.5-2.5 minutes, lasting 50-60 seconds and palpates moderate    Cervix: 4.5/ 90% / 0, Vtx  ROM: moderate meconium fluid    Pitocin- none  Antibiotics- none  Cervical ripening: N/A    ASSESSMENT:  IUP @ 41w5d minimal/no progress   GBS- negative  Membranes ruptured for  6.5 hours  Meconium fluid  Epidural anesthesia  Some coupling of contractions present; possible OP position    PLAN:   IUPC placed at 1430. MVU's 225. Pitocin not indicated at this time.  Side-lying release with next position change  Large peanut ball in place  Reevaluated in 2-4 hours or PRN    ABIMAEL Castellon CNM    "

## 2018-02-21 LAB — HGB BLD-MCNC: 11 G/DL (ref 11.7–15.7)

## 2018-02-21 PROCEDURE — 85018 HEMOGLOBIN: CPT | Performed by: ADVANCED PRACTICE MIDWIFE

## 2018-02-21 PROCEDURE — 12000037 ZZH R&B POSTPARTUM INTERMEDIATE

## 2018-02-21 PROCEDURE — 25000132 ZZH RX MED GY IP 250 OP 250 PS 637: Performed by: ADVANCED PRACTICE MIDWIFE

## 2018-02-21 PROCEDURE — 36415 COLL VENOUS BLD VENIPUNCTURE: CPT | Performed by: ADVANCED PRACTICE MIDWIFE

## 2018-02-21 RX ADMIN — HYDROCODONE BITARTRATE AND ACETAMINOPHEN 1 TABLET: 5; 325 TABLET ORAL at 22:02

## 2018-02-21 RX ADMIN — ACETAMINOPHEN 650 MG: 325 TABLET, FILM COATED ORAL at 13:49

## 2018-02-21 RX ADMIN — DOCUSATE SODIUM 100 MG: 100 CAPSULE, LIQUID FILLED ORAL at 08:27

## 2018-02-21 RX ADMIN — IBUPROFEN 800 MG: 400 TABLET ORAL at 17:28

## 2018-02-21 RX ADMIN — ACETAMINOPHEN 650 MG: 325 TABLET, FILM COATED ORAL at 03:37

## 2018-02-21 RX ADMIN — DOCUSATE SODIUM 100 MG: 100 CAPSULE, LIQUID FILLED ORAL at 22:02

## 2018-02-21 RX ADMIN — IBUPROFEN 800 MG: 400 TABLET ORAL at 03:37

## 2018-02-21 RX ADMIN — ACETAMINOPHEN 650 MG: 325 TABLET, FILM COATED ORAL at 08:27

## 2018-02-21 RX ADMIN — IBUPROFEN 800 MG: 400 TABLET ORAL at 10:37

## 2018-02-21 RX ADMIN — ACETAMINOPHEN 650 MG: 325 TABLET, FILM COATED ORAL at 18:06

## 2018-02-21 NOTE — PLAN OF CARE
Data: Haritha Bateman transferred to 425 via wheelchair at 2135. Baby transferred via parent's arms.  Action: Receiving unit notified of transfer: Yes. Patient and family notified of room change. Report given to Alicia FERGUSON RN at 2135. Belongings sent to receiving unit. Accompanied by Registered Nurse. Oriented patient to surroundings. Call light within reach. ID bands double-checked with receiving RN.  Response: Patient tolerated transfer and is stable.

## 2018-02-21 NOTE — PLAN OF CARE
Problem: Patient Care Overview  Goal: Plan of Care/Patient Progress Review  Outcome: No Change  Patient arrived to floor at 2130 via w/c to room 425 with baby in arms and father of baby at side.  Oriented to room/floor.  Vital signs stable.  Fundus firm at U. Small rubra flow. Voided 200 cc prior to coming to floor.  Iv of Pitocin infusing to left arm with out difficulty.  Planning to breast feed baby girl.  Continue to monitor.

## 2018-02-21 NOTE — PLAN OF CARE
Patient repositioned with Juana REYES, Juana off unit to go back to clinic around 1550. Haritha then started reporting contraction pain, she was tearful and vocalizing during contractions, coaching needed to control breathing. AUDREY paged, Dr. Hay at bedside at 1616 to assess patient's pain. Juana REYES paged at 1625 with call returned, updated regarding pain. Juana at bedside at 1631 to assess pain and cervix. Patient found to have a lip and +1 station. Options for pain discussed with patient, plan agreed upon by patient and provider to begin pushing. Juana REYES at bedside for pushing, reviewing fetal tracing [id number], and returned to bedside at 1651 and remained until delivery. NNP called prior to delivery of infant due to meconium. Michelle KAURP at bedside at 1805. Baby girl delivered at 1805, cord immediately clamped and cut, infant taken to warmer. Infant placed skin to skin with mom shortly after, breastfeeding attempted. Mom and baby stable, report given to Connie ZEPEDA RN at 1920.

## 2018-02-21 NOTE — L&D DELIVERY NOTE
"Delivery Summary    Haritha Bateman MRN# [id number]   Age: 27 year old YOB: 1990       At 1530, RN and CNM performed sidelying release to attempt to rotate baby from suspected OP position.  At 1625, CNM paged to room because Haritha had started experiencing large amounts of pain despite her epidural.  MDA was called to evaluate and noted that the epidural was in good placement and infusing. Emilylanishi was checked and was 10cm dilated and +1 station at 1636. Due to the pain Haritha was feeling, pushing began at that time with good maternal effort. Fetal tachycardia and variable decelerations noted during second stage of labor; 500cc bolus of LR given, position changes made, oxygen applied.  Haritha remained afebrile. Intermittent tachysystole present during second stage of labor, unresponsive to fluid bolus and position changes. Haritha breathed and took breaks during intermittent contractions.   of a viable female infant at 1805.  Nuchal cord x1; easily reduced at perineum. Both anterior and posterior shoulders delivered easily. Thick meconium after delivery of baby.  Cord clamped and cut for assessment of baby at the warmer by NNP.  Cord gases collected.  Placenta sent to pathology.      Delivery Note    IUP at 41w5d gestation delivered on 2018.     delivery of a viable Female infant, \"Shukriya\".  Nuchal cord x1; easily reduced.  Active pushing time 1 hour 29 minutes.  Apgars of 1 at 1 minute and 7 at 5 minutes, and 8 at 10 minutes.  NNP present for delivery? Yes, for meconium fluid. See NNP note.  Labor was spontaneous.    Medications administered  in labor:  Pain Rx: Fentanyl x2 and Epidural. Antibiotics: No. Other: None.  Perineum: 2nd degree. Repaired: Yes with 3-0 vicryl needle, local lidocaine administered for patient comfort.  Hemostasis achieved.  Delayed cord clamping: No, cord cut immediately for assessment of baby on the warmer by NNP.  Placenta-mechanism: spontaneous, intact,  with a 3 vessel " cord. Placenta was sent to Pathology due to low apgars and . IV oxytocin was given.  Quantitative Blood Loss:  219cc's  Complications of pregnancy, labor and delivery: Anemia during pregnancy requiring IV iron infusions, excessive weight gain during pregnancy, meconium fluid.    ABIMAEL Castellon CNM                ASSESSMENT & PLAN:   -: Routine postpartum cares  -Lactating mother: Lactation consultation        Labor Event Times    Labor onset date:  18 Onset time:   4:00 AM   Dilation complete date:  18 Complete time:   4:36 PM   Start pushing date/time:  2018 1636            Labor Events     labor?:  No    steroids:  None   Labor Type:  Spontaneous   Predominate monitoring during 1st stage:  continuous electronic fetal monitoring      Rupture date/time: 18 0820   Rupture type:  Artificial Rupture of Membranes   Fluid color:  Meconium   Fluid odor:  Normal      1:1 continuous labor support provided by?:  RN          Delivery/Placenta Date and Time    Delivery Date:  18 Delivery Time:   6:05 PM   Placenta Date/Time:  2018  6:23 PM   Oxytocin given at the time of delivery:  after delivery of placenta      Vaginal Counts    Initial count performed by 2 team members:   Two Team Members   PROMISE Hollis CNM          Needles Suture [place] [employer]ong[employer]   Initial counts 2 0 5    Added to count 0 2 5    Final counts 2 2 10       Placed during labor Accounted for at the end of labor   No NA   Yes Yes   No NA      Final count performed by 2 team members:   Two Team Members   PROMISE Hollis CNM         Final count correct?:  Yes         Apgars    Living status:  Living    1 Minute 5 Minute 10 Minute 15 Minute 20 Minute   Skin color: 0  1  1      Heart rate: 1  2  2      Reflex irritability: 0  1  2      Muscle tone: 0  1  1      Respiratory effort: 0  2  2      Total: 1  7  8            [place] Resuscitation    Methods:  Suctioning,  "Oxygen, NCPAP, Oximetry      [place] Care at Delivery:  Called to delivery for meconium stained fluid by midwife Bunny.  Infant placed on warmer limp, dusky, with no respiratory effort and heart rate <100/min.  Infant orally suctioned for small amount of meconium and placed on NCPAP  EEP of 5 and ~30%oxygen ventilation rate ~40/min. At 3 minutes of age infant's heart rate>100/min, ventilation breaths discontinued and NCPAP continued. .  Pulse oximeter placed with improving saturations--at 5 minutes saturations>90% weaned oxygen to room air.  NCPAP discontinued with color remaining pink. Infant's muscle tone improved by ~7 minutes of age.  Infant appears alert at 10 minutes with good color.  Infant stayed in mother's room for bonding. To STUART Philip, APRN- CNP, NNP 18  18:15       Measurements    Weight:  6 lb 10.9 oz Length:  1' 8\"   Head circumference:  32.4 cm       Skin to Skin and Feeding Plan    Skin to skin initiation date/time: 18   Skin to skin with:  Mother   Skin to skin end date/time:        Labor Events and Shoulder Dystocia    Fetal Tracing Prior to Delivery:  Category 2   Shoulder dystocia present?:  Neg            Delivery (Maternal) (Provider to Complete) ([id number])    Episiotomy:  None   Perineal lacerations:  2nd Repaired?:  Yes   Vaginal laceration?:  No    Cervical laceration?:  No          Mother's Information  Mother: Haritha Bateman #[id number]    Start of Mother's Information     IO Blood Loss  18 0400 - 18    Mom's I/O Activity            End of Mother's Information  Mother: Pamela Batemannishi #[id number]            Delivery - Provider to Complete ([id number])    Delivering clinician:  INDU BERGER   CNM Care:  Exclusive CNM care in labor   Attempted Delivery Types (Choose all that apply):  Spontaneous Vaginal Delivery   Delivery Type (Choose the 1 that will go to the Birth History):  Vaginal, Spontaneous Delivery                         "   Placenta    Immediate Cord Clamping:  Done   Date/Time:  2/20/2018  6:23 PM   Removal:  Spontaneous   Comments:  Appeared intact, 3v cord.  Heavy placenta, sent to pathology.   Disposition:  Pathology      Anesthesia    Method:  INTRAVENOUS REGIONAL, Epidural   Cervical dilation at placement:  0-3         Presentation and Position    Presentation:  Vertex   Position:  Right Occiput Anterior                    ABIMAEL CastellonM

## 2018-02-21 NOTE — PLAN OF CARE
Problem: Patient Care Overview  Goal: Plan of Care/Patient Progress Review  Outcome: Improving  VSS, breastfeeding well with some assistance with latch, pain controlled with Tylenol and Ibuprofen, states satisfaction with pain management, up independently and ambulating in room, voiding adequately- IV removed, interacting and bonding well with infant, encouraged to call with questions or concerns, will continue to monitor.

## 2018-02-21 NOTE — LACTATION NOTE
Initial Lactation visit.  Recommend unlimited, frequent breast feedings: At least 8 - 12 times every 24 hours. Avoid pacifiers and supplementation with formula unless medically indicated. Explained benefits of holding baby skin on skin to help promote better breastfeeding outcomes.  Haritha has been breast feeding, she reports her nipples are tender.  Asked if she wanted assistance, she declined.  Encouraged her to call for assistance if feeding is painful to have latch checked.  She asked about formula.  Explained the normal process of lactation and importance of breast feeding to get milk supply in and established.  Encouraged her not to supplement unless medically indicated.   Will revisit as needed.    Romi Kraus RN, IBCLC

## 2018-02-21 NOTE — PLAN OF CARE
Problem: Patient Care Overview  Goal: Plan of Care/Patient Progress Review  Outcome: Improving  Doing well. C/O cramping with feedings, given heating pad and pain meds with good results.

## 2018-02-21 NOTE — PLAN OF CARE
Critical lab value of Baby pH 7.11 reported to Juana Hollis CNM and Michelle TEMPLE at 1843. No further orders received at this time.

## 2018-02-21 NOTE — PROGRESS NOTES
"Brayden Moeller CNM Progress Note: Postpartum Day #1    2018  9:52 AM    SUBJECTIVE:  Patient is stable and is tolerating acitivity well  Baby is rooming in  Complications since 2 hours post delivery: None  Pain is well controlled.  Patient is taking pain medications.  Breastfeeding status:Initiated. Sore nipples. Would like to supplement with formula  Elimination:  She is voiding without difficulty.  She has not had a bowel movement. Positive flatus  Desired contraception: Oral contraceptives  Denies heavy bleeding and passing large clots.  Feels good about birth experience.    OBJECTIVE:  /67 (BP Location: Right arm)  Pulse 70  Temp 97.4  F (36.3  C) (Oral)  Resp 16  Ht 1.651 m (5' 5\")  Wt 85.7 kg (189 lb)  LMP 2017  SpO2 95%  Breastfeeding: Yes  BMI 31.45 kg/m2    Constitutional: healthy, alert, no distress, cooperative and smiling    Breasts: Currently breastfeeding    Fundus: Uterine fundus is firm, non-tender and at the level of the umbilicus    Perineum: Perineum is intact and/or well approximated, minimal swelling    Lochia: Lochia is appropriate for the duration of time since delivery.     ASSESSMENT:  PPD #1  Breastfeeding    Doing well.  Normal healing wound.  No excessive bleeding  Pain well-controlled    Hemoglobin   Date Value Ref Range Status   2018 11.0 (L) 11.7 - 15.7 g/dL Final     Comment:     [place] Verified         PLAN:  Continue routine care  Breast feeding strategies discussed  Discussed normal feeding habits of newborns. Lulah would like to supplement with formula.   Reviewed expectation of milk production  Lactation consultation today  Reviewed postpartum warning signs  Reviewed postpartum blues and postpartum depression warning signs  Plans oral contraceptives for contraception postpartum  Anticipated discharge 2018        ABIMAEL Renteria CNM        "

## 2018-02-22 VITALS
RESPIRATION RATE: 16 BRPM | HEART RATE: 97 BPM | HEIGHT: 65 IN | WEIGHT: 189 LBS | TEMPERATURE: 98.1 F | BODY MASS INDEX: 31.49 KG/M2 | SYSTOLIC BLOOD PRESSURE: 112 MMHG | OXYGEN SATURATION: 98 % | DIASTOLIC BLOOD PRESSURE: 67 MMHG

## 2018-02-22 LAB — COPATH REPORT: NORMAL

## 2018-02-22 PROCEDURE — 25000132 ZZH RX MED GY IP 250 OP 250 PS 637: Performed by: ADVANCED PRACTICE MIDWIFE

## 2018-02-22 RX ORDER — IBUPROFEN 200 MG
600-800 TABLET ORAL EVERY 8 HOURS PRN
Qty: 60 TABLET | Refills: 1 | Status: SHIPPED | OUTPATIENT
Start: 2018-02-22 | End: 2018-04-06

## 2018-02-22 RX ADMIN — IBUPROFEN 800 MG: 400 TABLET ORAL at 01:30

## 2018-02-22 RX ADMIN — HYDROCODONE BITARTRATE AND ACETAMINOPHEN 1 TABLET: 5; 325 TABLET ORAL at 01:53

## 2018-02-22 RX ADMIN — DOCUSATE SODIUM 100 MG: 100 CAPSULE, LIQUID FILLED ORAL at 08:36

## 2018-02-22 RX ADMIN — HYDROCODONE BITARTRATE AND ACETAMINOPHEN 1 TABLET: 5; 325 TABLET ORAL at 06:13

## 2018-02-22 RX ADMIN — IBUPROFEN 800 MG: 400 TABLET ORAL at 07:17

## 2018-02-22 RX ADMIN — HYDROCODONE BITARTRATE AND ACETAMINOPHEN 1 TABLET: 5; 325 TABLET ORAL at 10:30

## 2018-02-22 NOTE — LACTATION NOTE
Routine visit. Baby breastfeeding well on the right breast with lips flanged. Multiple swallows heard. Parents request pacifier for  infant: parents informed about impact of pacifier on breastfeeding success (latch problems, nipple confusion, lower milk supply) and AAP best practice recommendation not to use pacifier for  baby before one month of age.  Parents instructed on other soothing techniques for fussy baby. Questions answered regarding pumping and physiology of milk supply and production.  Discussed supplementing with Similac and or her EBM.  Nu-gels helping nipples to heal.  Mother reports she had bilateral bleeding.  Has a breast pump at home and Outpatient resource phone numbers given. No further questions at this time. Getting ready for discharge.  Plan: Watch for feeding cues and feed every 2-3 hours and/or on demand. Continue to use feeding log to track intake and appropriate voids and stools. Take feeding log to first follow up appointment or weight check. Encourage skin to skin to promote frequent feedings, thermoregulation and bonding. Follow-up with healthcare provider or lactation consultant for questions or concerns.    Audrey Garg RNC, IBCLC

## 2018-02-22 NOTE — PROGRESS NOTES
Pt stated she started feeling heart palpitations and SOB at 0345. Checked BP, HR, O2 and listened to heart and lungs- WDL. Pt thought it could be related to feeling anxious when the baby came back from the nursery hungry and crying.  Checked on the patient 30 mins later and she stated she felt better. Educated her to call if symptoms arise again. Will continue to monitor.

## 2018-02-22 NOTE — PLAN OF CARE
Problem: Patient Care Overview  Goal: Plan of Care/Patient Progress Review  Outcome: Improving  VSS, breastfeeding fair, nipples are tender, sore and bleeding- hydrogels and lanolin given, requested to supplement with formula due to nipple soreness, educated mom about importance of exclusively breastfeeding, mom would like to supplement prn and continue to breast feed as much as possible, pain controlled with aqua K pad, Norco and Ibuprofen, up independently with no complaints of dizziness,  at bedside, encouraged to call with questions or concerns, will continue to monitor.

## 2018-02-22 NOTE — DISCHARGE SUMMARY
Delivery Summary    Haritha Bateman MRN# [id number]   Age: 27 year old YOB: 1990     ASSESSMENT & PLAN: Stable PPD#2  Plan D/C today and f/u appt 6 weeks.  Plans minipill for BC        Labor Event Times    Labor onset date:  18 Onset time:   4:00 AM   Dilation complete date:  18 Complete time:   4:36 PM   Start pushing date/time:  2018 1636            Labor Length    1st Stage (hrs):  12 (min):  36   2nd Stage (hrs):  1 (min):  29   3rd Stage (hrs):  0 (min):  18      Labor Events     labor?:  No    steroids:  None   Labor Type:  Spontaneous   Predominate monitoring during 1st stage:  continuous electronic fetal monitoring      Rupture date/time: 18 0820   Rupture type:  Artificial Rupture of Membranes   Fluid color:  Meconium   Fluid odor:  Normal      1:1 continuous labor support provided by?:  RN          Delivery/Placenta Date and Time    Delivery Date:  18 Delivery Time:   6:05 PM   Placenta Date/Time:  2018  6:23 PM   Oxytocin given at the time of delivery:  after delivery of placenta      Vaginal Counts    Initial count performed by 2 team members:   Two Team Members   PROMISE Hollis CNM          Needles Suture [place] [employer]   Initial counts 2 0 5    Added to count 0 2 5    Final counts 2 2 10       Placed during labor Accounted for at the end of labor   No NA   Yes Yes   No NA      Final count performed by 2 team members:   Two Team Members   PROMISE Hollis CNM         Final count correct?:  Yes         Apgars    Living status:  Living    1 Minute 5 Minute 10 Minute 15 Minute 20 Minute   Skin color: 0  1  1      Heart rate: 1  2  2      Reflex irritability: 0  1  2      Muscle tone: 0  1  1      Respiratory effort: 0  2  2      Total: 1  7  8            Cord    Vessels:  3 Vessels Complications:  Nuchal    Gases Sent?:  Yes          Resuscitation    Methods:  Suctioning, Oxygen, NCPAP, Oximetry     "   Care at Delivery:  Called to delivery for meconium stained fluid by midwife Bunny.  Infant placed on warmer limp, dusky, with no respiratory effort and heart rate <100/min.  Infant orally suctioned for small amount of meconium and placed on NCPAP  EEP of 5 and ~30%oxygen ventilation rate ~40/min. At 3 minutes of age infant's heart rate>100/min, ventilation breaths discontinued and NCPAP continued. .  Pulse oximeter placed with improving saturations--at 5 minutes saturations>90% weaned oxygen to room air.  NCPAP discontinued with color remaining pink. Infant's muscle tone improved by ~7 minutes of age.  Infant appears alert at 10 minutes with good color.  Infant stayed in mother's room for bonding. To ABIMAEL Farrell- CNP, NNP 18  18:15      [place] Measurements    Weight:  6 lb 10.9 oz Length:  1' 8\"   Head circumference:  32.4 cm       Skin to Skin and Feeding Plan    Skin to skin initiation date/time: 18 181   Skin to skin with:  Mother   Skin to skin end date/time:        Labor Events and Shoulder Dystocia    Fetal Tracing Prior to Delivery:  Category 2   Shoulder dystocia present?:  Neg            Delivery (Maternal) (Provider to Complete) ([id number])    Episiotomy:  None   Perineal lacerations:  2nd Repaired?:  Yes   Vaginal laceration?:  No    Cervical laceration?:  No          Mother's Information  Mother: Haritha Bateman #[id number]    Start of Mother's Information     IO Blood Loss  18 0400 - 18 1805    Mom's I/O Activity            End of Mother's Information  Mother: Haritha Bateman #[id number]            Delivery - Provider to Complete ([id number])    Delivering clinician:  INDU BERGER   CNM Care:  Exclusive CNM care in labor   Attempted Delivery Types (Choose all that apply):  Spontaneous Vaginal Delivery   Delivery Type (Choose the 1 that will go to the Birth History):  Vaginal, Spontaneous Delivery                           Placenta    Immediate Cord " Clamping:  Done   Date/Time:  2/20/2018  6:23 PM   Removal:  Spontaneous   Comments:  Appeared intact, 3v cord.  Heavy placenta, sent to pathology.   Disposition:  Pathology      Anesthesia    Method:  INTRAVENOUS REGIONAL, Epidural   Cervical dilation at placement:  0-3         Presentation and Position    Presentation:  Vertex   Position:  Right Occiput Anterior                    ABIMAEL RiversM

## 2018-02-22 NOTE — PLAN OF CARE
Problem: Patient Care Overview  Goal: Plan of Care/Patient Progress Review  Outcome: Adequate for Discharge Date Met: 02/22/18  Doing well and functioning independent with cares of self and baby. Continues to occasionally use norco for pain of coccyx and perineum not resolved with tylenol and ibuprofen. Ready for discharge home.

## 2018-02-22 NOTE — PLAN OF CARE
Problem: Patient Care Overview  Goal: Plan of Care/Patient Progress Review  Patient meeting expected goals for this shift.  Using ibuprofen, tylenol or norco and heat for pain/comfort.  Requested  in and out of nursery for rest.  Independent in all self cares.  Working on breastfeeding  and  cares.   present at bedside and supportive.  Positive bonding behaviors observed.  Continue to monitor and notify MD as needed.

## 2018-02-22 NOTE — PROGRESS NOTES
"CNM Postpartum Discharge Note    SIGNIFICANT PROBLEMS:  Patient Active Problem List    Diagnosis Date Noted      (normal spontaneous vaginal delivery) 2018     Priority: Medium     Encounter for triage in pregnant patient 2018     Priority: Medium     Indication for care in labor or delivery 2018     Priority: Medium     Excessive weight gain during pregnancy, third trimester 2018     Priority: Medium     51 pounds at 41 weeks       Anemia complicating pregnancy in third trimester 2017     Priority: Medium     Hgb 9.9  Iron Sat% 7  Ferritin 5  Retic ct 2.3  2017: Order faxed for Iron transfusions 300 mg IV for 3 doses   17: Order re-faxed  Venofer transfusions: , , : Hgb at 10.2  : Hgb 11.4.  Advised Lulah to continue oral iron and iron-rich diet. Consider recheck at 36w.       Supervision of high risk pregnancy in third trimester 2017     Priority: Medium     FOB:Leoncio JOSEPH: 2018  B+ Post/Girl!   Genetic:declined Tdap: 17         Flu:           GBS:   Negative  20 week transfer   1 Hr GCT/hgb: Passed 103/ 9.9               SUBJECTIVE:  Patient is stable  and is tolerating acitivity well.  FOB in room  Baby is rooming in  Complications since 2 hours post delivery: None  Pain is well controlled.  Patient is taking pain medications. Taking Norco now, will just use ibuprofen and tylenol at home  Breastfeeding status:initiated   Elimination:  She is voiding without difficulty.  She has not had a bowel movement  Desired contraception Progestin-only pill  Denies heavy bleeding and passing large clots.    INTERVAL HISTORY:  /67 (BP Location: Left arm)  Pulse 97  Temp 98.1  F (36.7  C) (Oral)  Resp 16  Ht 1.651 m (5' 5\")  Wt 85.7 kg (189 lb)  LMP 2017  SpO2 98%  Breastfeeding? Unknown  BMI 31.45 kg/m2    Constitutional: healthy, alert and no distress  No exam done, per RN flow sheets all wnl, patient declines " exam, has no c/o's    Postpartum hemoglobin   Hemoglobin   Date Value Ref Range Status   2018 11.0 (L) 11.7 - 15.7 g/dL Final     Comment:     Delta Verified     Blood type   Lab Results   Component Value Date    ABO B 2018       Lab Results   Component Value Date    RH Pos 2018     Rubella status   Lab Results   Component Value Date    RUBELLAABIGG 8.15 2017     History of depression:  no    ASSESSMENT/PLAN:  Normal postpartum course  Stable Post-partum day #2  Complications:none  Postpartum warning s/s reviewed, including bleeding/clots, fever, mastitis & thromboemboli   Exercise, diet and rest reviewed  PP Kegels/crunches reviewed  Continue prenatal vitamins while breastfeeding  Birthcontrol planned:Progestin only pills, pt does not have a prescriptionor as d/c meds  Educated on postpartum blues and postpartum depression warnings signs/symptoms  2 week phone call follow-up to be done by delivering CNM  Follow-up in 6 weeks with CNMs at [facility]  Plan d/c home today, Rx for ibuprofen given    Current Discharge Medication List      START taking these medications    Details   ibuprofen (ADVIL/MOTRIN) 200 MG tablet Take 3-4 tablets (600-800 mg) by mouth every 8 hours as needed for pain  Qty: 60 tablet, Refills: 1    Associated Diagnoses:  (normal spontaneous vaginal delivery)         CONTINUE these medications which have NOT CHANGED    Details   Prenatal Vit-Fe Fumarate-FA (PRENATAL PLUS) 27-1 MG TABS Take 1 tablet by mouth daily  Qty: 100 tablet, Refills: 3    Associated Diagnoses: Supervision of high risk pregnancy in third trimester; 33 weeks gestation of pregnancy      !! order for DME Equipment being ordered: One standard double electric breast pump with accessories, to be used for 99 years  Qty: 1 Device, Refills: 0    Associated Diagnoses: Lactating mother; Breastfeeding (infant)      !! order for DME Equipment being ordered: One pregnancy support belt  for low back pain in pregnancy.  Qty: 1 Device, Refills: 0    Associated Diagnoses: Low back pain during pregnancy in third trimester       !! - Potential duplicate medications found. Please discuss with provider.      STOP taking these medications       Ferrous Sulfate (IRON SUPPLEMENT PO) Comments:   Reason for Stopping:         Cyanocobalamin (VITAMIN B 12 PO) Comments:   Reason for Stopping:         Ascorbic Acid (VITAMIN C PO) Comments:   Reason for Stopping:               ABIMAEL Rivers CNM

## 2018-02-22 NOTE — ANESTHESIA POSTPROCEDURE EVALUATION
Patient: Haritha Bateman    * No procedures listed *    Diagnosis:* No pre-op diagnosis entered *  Diagnosis Additional Information: No value filed.    Anesthesia Type:  No value filed.    Note:  Anesthesia Post Evaluation    Patient location during evaluation: PACU  Patient participation: Able to fully participate in evaluation  Level of consciousness: awake  Pain management: adequate  Airway patency: patent  Cardiovascular status: acceptable  Respiratory status: acceptable  Hydration status: acceptable  PONV: none     Anesthetic complications: None          Last vitals:  Vitals:    02/21/18 0337 02/21/18 0815 02/21/18 1728   BP:  112/67 110/71   Pulse:   84   Resp: 16 16 16   Temp:  36.3  C (97.4  F) 36.5  C (97.7  F)   SpO2:            Electronically Signed By: Sharan Souza MD  February 21, 2018  7:42 PM

## 2018-02-23 ENCOUNTER — OFFICE VISIT (OUTPATIENT)
Dept: MIDWIFE SERVICES | Facility: CLINIC | Age: 28
End: 2018-02-23
Payer: COMMERCIAL

## 2018-02-23 ENCOUNTER — TELEPHONE (OUTPATIENT)
Dept: NURSING | Facility: CLINIC | Age: 28
End: 2018-02-23

## 2018-02-23 VITALS
DIASTOLIC BLOOD PRESSURE: 76 MMHG | BODY MASS INDEX: 31.12 KG/M2 | WEIGHT: 187 LBS | SYSTOLIC BLOOD PRESSURE: 122 MMHG | HEART RATE: 84 BPM | TEMPERATURE: 98.3 F

## 2018-02-23 DIAGNOSIS — S31.41XA LACERATION OF VAGINA, INITIAL ENCOUNTER: ICD-10-CM

## 2018-02-23 DIAGNOSIS — N89.8 VAGINAL DISCHARGE: Primary | ICD-10-CM

## 2018-02-23 PROBLEM — Z36.89 ENCOUNTER FOR TRIAGE IN PREGNANT PATIENT: Status: RESOLVED | Noted: 2018-02-20 | Resolved: 2018-02-23

## 2018-02-23 LAB
GRAM STN SPEC: ABNORMAL
Lab: ABNORMAL
SPECIMEN SOURCE: ABNORMAL
SPECIMEN SOURCE: NORMAL
WET PREP SPEC: NORMAL

## 2018-02-23 PROCEDURE — 87210 SMEAR WET MOUNT SALINE/INK: CPT | Performed by: ADVANCED PRACTICE MIDWIFE

## 2018-02-23 PROCEDURE — 87205 SMEAR GRAM STAIN: CPT | Performed by: ADVANCED PRACTICE MIDWIFE

## 2018-02-23 PROCEDURE — 99213 OFFICE O/P EST LOW 20 MIN: CPT | Performed by: ADVANCED PRACTICE MIDWIFE

## 2018-02-23 PROCEDURE — 87653 STREP B DNA AMP PROBE: CPT | Performed by: ADVANCED PRACTICE MIDWIFE

## 2018-02-23 NOTE — MR AVS SNAPSHOT
After Visit Summary   2/23/2018    Haritha Bateman    MRN: [id number]           Patient Information     Date Of Birth          1990        Visit Information        Provider Department      2/23/2018 3:00 PM Romi Goldsmith APRN CNM [facility]        Today's Diagnoses     Vaginal discharge    -  1    Laceration of vagina, initial encounter           Follow-ups after your visit        Follow-up notes from your care team     Return in about 6 weeks (around 4/6/2018).      Who to contact     If you have questions or need follow up information about today's clinic visit or your schedule please contact [facility] directly at [phone].  Normal or non-critical lab and imaging results will be communicated to you by Agent Partnerhart, letter or phone within 4 business days after the clinic has received the results. If you do not hear from us within 7 days, please contact the clinic through Agent Partnerhart or phone. If you have a critical or abnormal lab result, we will notify you by phone as soon as possible.  Submit refill requests through Swrve or call your pharmacy and they will forward the refill request to us. Please allow 3 business days for your refill to be completed.          Additional Information About Your Visit        MyChart Information     Swrve gives you secure access to your electronic health record. If you see a primary care provider, you can also send messages to your care team and make appointments. If you have questions, please call your primary care clinic.  If you do not have a primary care provider, please call [phone] and they will assist you.        Care EveryWhere ID     This is your Care EveryWhere ID. This could be used by other organizations to access your [facility] medical records  [id number]        Your Vitals Were     Pulse Temperature Last Period Breastfeeding? BMI (Body Mass Index)       84 98.3  F (36.8  C) (Oral) 05/04/2017 Yes  31.12 kg/m2        Blood Pressure from Last 3 Encounters:   18 122/76   18 112/67   18 124/76    Weight from Last 3 Encounters:   18 187 lb (84.8 kg)   18 189 lb (85.7 kg)   18 189 lb (85.7 kg)              We Performed the Following     Gram stain     Group B strep PCR     Wet prep        Primary Care Provider Fax #    Physician No Ref-Primary [phone]       No address on file        Equal Access to Services     ADEOLA IBRAHIM : Hadii rupinder tenorioo Soomaali, waaxda luqadaha, qaybta kaalmada adescooter, cayetano amador . So [facility] [phone].    ATENCIÓN: Si habla español, tiene a mishra disposición servicios gratuitos de asistencia lingüística. Llame al [phone].    We comply with applicable federal civil rights laws and [place] laws. We do not discriminate on the basis of race, color, national origin, age, disability, sex, sexual orientation, or gender identity.            Thank you!     Thank you for choosing [facility] [place]  for your care. Our goal is always to provide you with excellent care. Hearing back from our patients is one way we can continue to improve our services. Please take a few minutes to complete the written survey that you may receive in the mail after your visit with us. Thank you!             Your Updated Medication List - Protect others around you: Learn how to safely use, store and throw away your medicines at www.disposemymeds.org.          This list is accurate as of 18  3:47 PM.  Always use your most recent med list.                   Brand Name Dispense Instructions for use Diagnosis    ibuprofen 200 MG tablet    ADVIL/MOTRIN    60 tablet    Take 3-4 tablets (600-800 mg) by mouth every 8 hours as needed for pain     (normal spontaneous vaginal delivery)       PRENATAL PLUS 27-1 MG Tabs     100 tablet    Take 1 tablet by mouth daily    Supervision of high risk pregnancy in third trimester, 33 weeks  gestation of pregnancy

## 2018-02-23 NOTE — PROGRESS NOTES
"  SUBJECTIVE:                                                   Haritha Bateman is a 27 year old who presents to clinic today for the following health issue(s):  Patient presents with:  Vaginal Problem      HPI:   . Patient reports \"foul smelling\" discharge since delivery. Reports lots of soreness and discomfort but manageable with ibuprofen. Denies abdominal pain and fever. Also wondering if having shortness of breath during breastfeeding is normal. Denies HA, lightheadedness, and other signs of anemia.     Patient's last menstrual period was 2017.  Patient is sexually active  .  Using none for contraception.   Health maintenance updated:  yes  STI infx testing offered:  Declined    Last PHQ-9 score on record =   PHQ-9 SCORE 2017   Total Score 0     Last GAD7 score on record =   BAKARI-7 SCORE 2017   Total Score 0     Alcohol Score = 0    Problem list and histories reviewed & adjusted, as indicated.  Additional history: as documented.    Patient Active Problem List   Diagnosis     Supervision of high risk pregnancy in third trimester     Anemia complicating pregnancy in third trimester     Excessive weight gain during pregnancy, third trimester      (normal spontaneous vaginal delivery)     Past Surgical History:   Procedure Laterality Date     NO HISTORY OF SURGERY  2017      Social History   Substance Use Topics     Smoking status: Never Smoker     Smokeless tobacco: Never Used     Alcohol use No      Problem (# of Occurrences) Relation (Name,Age of Onset)    Hypertension (2) Mother, Paternal Grandmother    Other Cancer (1) Paternal Grandmother            Current Outpatient Prescriptions   Medication Sig     ibuprofen (ADVIL/MOTRIN) 200 MG tablet Take 3-4 tablets (600-800 mg) by mouth every 8 hours as needed for pain     Prenatal Vit-Fe Fumarate-FA (PRENATAL PLUS) 27-1 MG TABS Take 1 tablet by mouth daily     No current facility-administered medications for this visit.      No " Known Allergies    ROS:  12 point review of systems negative other than symptoms noted below.  Genitourinary: Vaginal Discharge    OBJECTIVE:     /76  Pulse 84  Temp 98.3  F (36.8  C) (Oral)  Wt 187 lb (84.8 kg)  LMP 05/04/2017  Breastfeeding? Yes  BMI 31.12 kg/m2  Body mass index is 31.12 kg/(m^2).    PHYSICAL EXAM:  Constitutional:  Appearance: Well nourished, well developed alert, in no acute distress   PELVIC EXAM:  Vulva: No external lesions, BUS WNL  Vagina: Moist, pink, discharge normal  well rugated, no lesions, laceration appears to be healing well, mild odor, not foul  Rectal exam: deferred      In-Clinic Test Results:  Results for orders placed or performed in visit on 02/23/18 (from the past 24 hour(s))   Wet prep   Result Value Ref Range    Specimen Description Vagina     Wet Prep No Trichomonas seen     Wet Prep No clue cells seen     Wet Prep No yeast seen        ASSESSMENT/PLAN:                                                        ICD-10-CM    1. Vaginal discharge N89.8 Wet prep     Gram stain     Group B strep PCR   2. Laceration of vagina, initial encounter S31.41XA        COUNSELING:  Wet prep, gram stain, and GBS done   Wet prep negative  Discussed signs and symptoms of infection both vaginal and uterine  Reviewed changing blood volume may be causing palpitations, if persist patient to call back  Plan for hemoglobin at PP due to anemia, on discharge from hospital 11  Reviewed and encouraged good nutrition and hydration  Reviewed good hygiene  Will call with results    15 minutes was spent face to face with the patient today discussing her history, diagnosis, and follow-up plan as noted above. Over 50% of the visit was spent in counseling and coordination of care.    Total Visit Time: 15 minutes.       ABIMAEL Yusuf, AMY

## 2018-02-23 NOTE — TELEPHONE ENCOUNTER
18. Pt states she has foul smelling vaginal discharge. Discharge is yellow in color. Pt denies fever. Pt states she thinks it is coming from stitches. Pt informed she needs to be seen. Routed to scheduling to make appointment.

## 2018-02-24 ENCOUNTER — OFFICE VISIT (OUTPATIENT)
Dept: URGENT CARE | Facility: URGENT CARE | Age: 28
End: 2018-02-24
Payer: COMMERCIAL

## 2018-02-24 ENCOUNTER — HOSPITAL ENCOUNTER (EMERGENCY)
Facility: CLINIC | Age: 28
Discharge: HOME | End: 2018-02-24
Attending: EMERGENCY MEDICINE | Admitting: EMERGENCY MEDICINE
Payer: COMMERCIAL

## 2018-02-24 ENCOUNTER — APPOINTMENT (OUTPATIENT)
Dept: ULTRASOUND IMAGING | Facility: CLINIC | Age: 28
End: 2018-02-24
Attending: EMERGENCY MEDICINE
Payer: COMMERCIAL

## 2018-02-24 VITALS
SYSTOLIC BLOOD PRESSURE: 126 MMHG | DIASTOLIC BLOOD PRESSURE: 88 MMHG | RESPIRATION RATE: 18 BRPM | WEIGHT: 189.6 LBS | BODY MASS INDEX: 31.55 KG/M2 | TEMPERATURE: 99.2 F | HEART RATE: 98 BPM | OXYGEN SATURATION: 98 %

## 2018-02-24 VITALS
WEIGHT: 184.4 LBS | SYSTOLIC BLOOD PRESSURE: 132 MMHG | BODY MASS INDEX: 30.69 KG/M2 | DIASTOLIC BLOOD PRESSURE: 92 MMHG | TEMPERATURE: 98.7 F | HEART RATE: 94 BPM | RESPIRATION RATE: 20 BRPM

## 2018-02-24 DIAGNOSIS — R30.0 DYSURIA: Primary | ICD-10-CM

## 2018-02-24 LAB
ALBUMIN UR-MCNC: 100 MG/DL
ALBUMIN UR-MCNC: 30 MG/DL
ANION GAP SERPL CALCULATED.3IONS-SCNC: 10 MMOL/L (ref 3–14)
APPEARANCE UR: ABNORMAL
APPEARANCE UR: CLEAR
BASOPHILS # BLD AUTO: 0 10E9/L (ref 0–0.2)
BASOPHILS # BLD AUTO: 0 10E9/L (ref 0–0.2)
BASOPHILS NFR BLD AUTO: 0 %
BASOPHILS NFR BLD AUTO: 0.2 %
BILIRUB UR QL STRIP: ABNORMAL
BILIRUB UR QL STRIP: NEGATIVE
BUN SERPL-MCNC: 17 MG/DL (ref 7–30)
CALCIUM SERPL-MCNC: 8.6 MG/DL (ref 8.5–10.1)
CHLORIDE SERPL-SCNC: 107 MMOL/L (ref 94–109)
CO2 SERPL-SCNC: 23 MMOL/L (ref 20–32)
COLOR UR AUTO: ABNORMAL
COLOR UR AUTO: YELLOW
CREAT SERPL-MCNC: 0.56 MG/DL (ref 0.52–1.04)
DIFFERENTIAL METHOD BLD: ABNORMAL
DIFFERENTIAL METHOD BLD: ABNORMAL
EOSINOPHIL # BLD AUTO: 0.2 10E9/L (ref 0–0.7)
EOSINOPHIL # BLD AUTO: 0.2 10E9/L (ref 0–0.7)
EOSINOPHIL NFR BLD AUTO: 1.5 %
EOSINOPHIL NFR BLD AUTO: 2 %
ERYTHROCYTE [DISTWIDTH] IN BLOOD BY AUTOMATED COUNT: 16.1 % (ref 10–15)
ERYTHROCYTE [DISTWIDTH] IN BLOOD BY AUTOMATED COUNT: 16.2 % (ref 10–15)
GFR SERPL CREATININE-BSD FRML MDRD: >90 ML/MIN/1.7M2
GLUCOSE SERPL-MCNC: 74 MG/DL (ref 70–99)
GLUCOSE UR STRIP-MCNC: NEGATIVE MG/DL
GLUCOSE UR STRIP-MCNC: NEGATIVE MG/DL
GP B STREP DNA SPEC QL NAA+PROBE: NEGATIVE
HCT VFR BLD AUTO: 34.2 % (ref 35–47)
HCT VFR BLD AUTO: 34.5 % (ref 35–47)
HGB BLD-MCNC: 11.3 G/DL (ref 11.7–15.7)
HGB BLD-MCNC: 11.7 G/DL (ref 11.7–15.7)
HGB UR QL STRIP: ABNORMAL
HGB UR QL STRIP: ABNORMAL
KETONES UR STRIP-MCNC: NEGATIVE MG/DL
KETONES UR STRIP-MCNC: NEGATIVE MG/DL
LACTATE BLD-SCNC: 0.7 MMOL/L (ref 0.7–2)
LEUKOCYTE ESTERASE UR QL STRIP: ABNORMAL
LEUKOCYTE ESTERASE UR QL STRIP: ABNORMAL
LYMPHOCYTES # BLD AUTO: 1.6 10E9/L (ref 0.8–5.3)
LYMPHOCYTES # BLD AUTO: 1.9 10E9/L (ref 0.8–5.3)
LYMPHOCYTES NFR BLD AUTO: 13.6 %
LYMPHOCYTES NFR BLD AUTO: 17 %
MCH RBC QN AUTO: 31.3 PG (ref 26.5–33)
MCH RBC QN AUTO: 31.5 PG (ref 26.5–33)
MCHC RBC AUTO-ENTMCNC: 32.8 G/DL (ref 31.5–36.5)
MCHC RBC AUTO-ENTMCNC: 34.2 G/DL (ref 31.5–36.5)
MCV RBC AUTO: 92 FL (ref 78–100)
MCV RBC AUTO: 96 FL (ref 78–100)
METAMYELOCYTES # BLD: 0.1 10E9/L
METAMYELOCYTES NFR BLD MANUAL: 1 %
MONOCYTES # BLD AUTO: 0.8 10E9/L (ref 0–1.3)
MONOCYTES # BLD AUTO: 0.9 10E9/L (ref 0–1.3)
MONOCYTES NFR BLD AUTO: 6.6 %
MONOCYTES NFR BLD AUTO: 8 %
MUCOUS THREADS #/AREA URNS LPF: PRESENT /LPF
MYELOCYTES # BLD: 0.1 10E9/L
MYELOCYTES NFR BLD MANUAL: 1 %
NEUTROPHILS # BLD AUTO: 7.8 10E9/L (ref 1.6–8.3)
NEUTROPHILS # BLD AUTO: 9.3 10E9/L (ref 1.6–8.3)
NEUTROPHILS NFR BLD AUTO: 71 %
NEUTROPHILS NFR BLD AUTO: 78.1 %
NITRATE UR QL: NEGATIVE
NITRATE UR QL: NEGATIVE
NRBC # BLD AUTO: 0.1 10*3/UL
NRBC BLD AUTO-RTO: 1 /100
PH UR STRIP: 6.5 PH (ref 5–7)
PH UR STRIP: 6.5 PH (ref 5–7)
PLATELET # BLD AUTO: 190 10E9/L (ref 150–450)
PLATELET # BLD AUTO: 196 10E9/L (ref 150–450)
PLATELET # BLD EST: ABNORMAL 10*3/UL
POTASSIUM SERPL-SCNC: 3.8 MMOL/L (ref 3.4–5.3)
RBC # BLD AUTO: 3.61 10E12/L (ref 3.8–5.2)
RBC # BLD AUTO: 3.72 10E12/L (ref 3.8–5.2)
RBC #/AREA URNS AUTO: 65 /HPF (ref 0–2)
RBC MORPH BLD: NORMAL
SODIUM SERPL-SCNC: 140 MMOL/L (ref 133–144)
SOURCE: ABNORMAL
SOURCE: ABNORMAL
SP GR UR STRIP: 1.02 (ref 1–1.03)
SP GR UR STRIP: 1.02 (ref 1–1.03)
SPECIMEN SOURCE: NORMAL
SQUAMOUS #/AREA URNS AUTO: 1 /HPF (ref 0–1)
UROBILINOGEN UR STRIP-ACNC: 0.2 EU/DL (ref 0.2–1)
UROBILINOGEN UR STRIP-MCNC: NORMAL MG/DL (ref 0–2)
WBC # BLD AUTO: 11 10E9/L (ref 4–11)
WBC # BLD AUTO: 11.9 10E9/L (ref 4–11)
WBC #/AREA URNS AUTO: 56 /HPF (ref 0–2)

## 2018-02-24 PROCEDURE — 87086 URINE CULTURE/COLONY COUNT: CPT | Performed by: FAMILY MEDICINE

## 2018-02-24 PROCEDURE — 76856 US EXAM PELVIC COMPLETE: CPT

## 2018-02-24 PROCEDURE — 99214 OFFICE O/P EST MOD 30 MIN: CPT | Performed by: FAMILY MEDICINE

## 2018-02-24 PROCEDURE — 36415 COLL VENOUS BLD VENIPUNCTURE: CPT | Performed by: FAMILY MEDICINE

## 2018-02-24 PROCEDURE — 85025 COMPLETE CBC W/AUTO DIFF WBC: CPT | Performed by: FAMILY MEDICINE

## 2018-02-24 PROCEDURE — 80048 BASIC METABOLIC PNL TOTAL CA: CPT | Performed by: EMERGENCY MEDICINE

## 2018-02-24 PROCEDURE — 83605 ASSAY OF LACTIC ACID: CPT | Performed by: EMERGENCY MEDICINE

## 2018-02-24 PROCEDURE — 99284 EMERGENCY DEPT VISIT MOD MDM: CPT | Mod: 25

## 2018-02-24 PROCEDURE — 81003 URINALYSIS AUTO W/O SCOPE: CPT | Performed by: FAMILY MEDICINE

## 2018-02-24 PROCEDURE — 85025 COMPLETE CBC W/AUTO DIFF WBC: CPT | Performed by: EMERGENCY MEDICINE

## 2018-02-24 PROCEDURE — 36415 COLL VENOUS BLD VENIPUNCTURE: CPT

## 2018-02-24 PROCEDURE — 87086 URINE CULTURE/COLONY COUNT: CPT | Performed by: EMERGENCY MEDICINE

## 2018-02-24 PROCEDURE — 87040 BLOOD CULTURE FOR BACTERIA: CPT | Performed by: EMERGENCY MEDICINE

## 2018-02-24 PROCEDURE — 81001 URINALYSIS AUTO W/SCOPE: CPT | Performed by: EMERGENCY MEDICINE

## 2018-02-24 ASSESSMENT — ENCOUNTER SYMPTOMS: ABDOMINAL PAIN: 0

## 2018-02-24 NOTE — ED AVS SNAPSHOT
Emergency Department    [address] [place] [address]    Phone:  [phone]    Fax:  [phone]                                       Haritha Bateman   MRN: [id number]    Department:   Emergency Department   Date of Visit:  2/24/2018           After Visit Summary Signature Page     I have received my discharge instructions, and my questions have been answered. I have discussed any challenges I see with this plan with the nurse or doctor.    ..........................................................................................................................................  Patient/Patient Representative Signature      ..........................................................................................................................................  Patient Representative Print Name and Relationship to Patient    ..................................................               ................................................  Date                                            Time    ..........................................................................................................................................  Reviewed by Signature/Title    ...................................................              ..............................................  Date                                                            Time

## 2018-02-24 NOTE — PROGRESS NOTES
SUBJECTIVE  HPI:  Haritha Bateman is a 27 year old female who presents with the CC of abdominal/pelvic pain.    Pain is located in the suprapubic area, without radiation to the back.  The pain is characterized as aching, and at worst is a level 4 on a scale of 1-10.  Pain has been present for 3 day(s) and is stable.  EXACERBATING FACTORS: movement/walking  RELIEVING FACTORS: remaining still.  ASSOCIATED SX: dysuria and fever.    Past Medical History:   Diagnosis Date     NO ACTIVE PROBLEMS 09/22/2017     Current Outpatient Prescriptions   Medication Sig Dispense Refill     Acetaminophen (TYLENOL PO)        ibuprofen (ADVIL/MOTRIN) 200 MG tablet Take 3-4 tablets (600-800 mg) by mouth every 8 hours as needed for pain 60 tablet 1     Prenatal Vit-Fe Fumarate-FA (PRENATAL PLUS) 27-1 MG TABS Take 1 tablet by mouth daily 100 tablet 3     Social History   Substance Use Topics     Smoking status: Never Smoker     Smokeless tobacco: Never Used     Alcohol use No       ROS:  GI: NEGATIVE for diarrhea and melena  ENDOCRINE: NEGATIVE for cold intolerance, exopthalmos and hair loss    OBJECTIVE:  BP (!) 132/92  Pulse 94  Temp 98.7  F (37.1  C) (Oral)  Resp 20  Wt 184 lb 6.4 oz (83.6 kg)  LMP 05/04/2017  BMI 30.69 kg/m2     GENERAL APPEARANCE: healthy, alert and no distress  EYES: EOMI,  PERRL, conjunctiva clear  HENT: ear canals and TM's normal.  Nose and mouth without ulcers, erythema or lesions  NECK: supple, nontender, no lymphadenopathy  RESP: lungs clear to auscultation - no rales, rhonchi or wheezes  CV: regular rates and rhythm, normal S1 S2, no murmur noted  ABDOMEN: soft, tenderness mild suprapubic  NEURO: Normal strength and tone, sensory exam grossly normal,  normal speech and mentation  SKIN: no suspicious lesions or rashes    ASSESSMENT:  1. Dysuria  Patient likely has mild endometritis    Wbc is elevated   Urine is dirty     Reviewed up to date recs for oral meds and augmentin listed    Will add urine culture to  ensure sensitivity.   Reviewed pathology and noted GBS pending and mild chorio on placental evaluation    Pt will see her pcp in 2 days    To ED with increased pain, fever increases, nausea, etc.    - UA without Microscopic  - Urine Culture Aerobic Bacterial  - CBC with platelets and differential  - amoxicillin-clavulanate (AUGMENTIN) 875-125 MG per tablet; Take 1 tablet by mouth 2 times daily  Dispense: 28 tablet; Refill: 0     See Orders in Epic

## 2018-02-24 NOTE — ED AVS SNAPSHOT
Emergency Department    [address] [facility] [address]    Phone:  [phone]    Fax:  [phone]                                       Haritha Bateman   MRN: [id number]    Department:   Emergency Department   Date of Visit:  2/24/2018           Patient Information     Date Of Birth          1990        Your diagnoses for this visit were:     Postpartum fever -likely endometritis       You were seen by Shauna White MD.      Follow-up Information     Follow up with your OB/midwife. Schedule an appointment as soon as possible for a visit in 2 days.        Follow up with  Emergency Department.    Specialty:  EMERGENCY MEDICINE    Why:  As needed, If symptoms worsen    Contact information:    [address] [facility] [address] [phone]      Discharge References/Attachments     ENDOMETRITIS, OBSTETRIC (ENGLISH)      24 Hour Appointment Hotline       To make an appointment at any [facility], call [phone] ([phone]). If you don't have a family doctor or clinic, we will help you find one. [facility] clinics are conveniently located to serve the needs of you and your family.             Review of your medicines      Our records show that you are taking the medicines listed below. If these are incorrect, please call your family doctor or clinic.        Dose / Directions Last dose taken    amoxicillin-clavulanate 875-125 MG per tablet   Commonly known as:  AUGMENTIN   Dose:  1 tablet   Quantity:  28 tablet        Take 1 tablet by mouth 2 times daily   Refills:  0        ibuprofen 200 MG tablet   Commonly known as:  ADVIL/MOTRIN   Dose:  600-800 mg   Quantity:  60 tablet        Take 3-4 tablets (600-800 mg) by mouth every 8 hours as needed for pain   Refills:  1        PRENATAL PLUS 27-1 MG Tabs   Dose:  1 tablet   Quantity:  100 tablet        Take 1 tablet by mouth daily   Refills:  3        TYLENOL PO        Refills:  0                Procedures and  tests performed during your visit     Procedure/Test Number of Times Performed    Basic metabolic panel 1    Blood culture 2    CBC with platelets differential 1    Lactic acid whole blood 1    UA with Microscopic 1    US Pelvis Complete without Transvaginal 1    Urine Culture 1      Orders Needing Specimen Collection     None      Pending Results     Date and Time Order Name Status Description    2/24/2018 2121 Blood culture In process     2/24/2018 2121 Blood culture In process     2/24/2018 2121 Urine Culture In process     2/24/2018 1302 URINE CULTURE AEROBIC BACTERIAL In process             Pending Culture Results     Date and Time Order Name Status Description    2/24/2018 2121 Blood culture In process     2/24/2018 2121 Blood culture In process     2/24/2018 2121 Urine Culture In process     2/24/2018 1302 URINE CULTURE AEROBIC BACTERIAL In process             Pending Results Instructions     If you had any lab results that were not finalized at the time of your Discharge, you can call the ED Lab Result RN at [phone]. You will be contacted by this team for any positive Lab results or changes in treatment. The nurses are available 7 days a week from 10A to 6:30P.  You can leave a message 24 hours per day and they will return your call.        Test Results From Your Hospital Stay        2/24/2018 10:22 PM      Component Results     Component Value Ref Range & Units Status    WBC 11.0 4.0 - 11.0 10e9/L Final    RBC Count 3.72 (L) 3.8 - 5.2 10e12/L Final    Hemoglobin 11.7 11.7 - 15.7 g/dL Final    Hematocrit 34.2 (L) 35.0 - 47.0 % Final    MCV 92 78 - 100 fl Final    MCH 31.5 26.5 - 33.0 pg Final    MCHC 34.2 31.5 - 36.5 g/dL Final    RDW 16.1 (H) 10.0 - 15.0 % Final    Platelet Count 196 150 - 450 10e9/L Final    Diff Method Manual Differential  Final    % Neutrophils 71.0 % Final    % Lymphocytes 17.0 % Final    % Monocytes 8.0 % Final    % Eosinophils 2.0 % Final    % Basophils 0.0 % Final    %  Metamyelocytes 1.0 % Final    % Myelocytes 1.0 % Final    Nucleated RBCs 1 (H) 0 /100 Final    Absolute Neutrophil 7.8 1.6 - 8.3 10e9/L Final    Absolute Lymphocytes 1.9 0.8 - 5.3 10e9/L Final    Absolute Monocytes 0.9 0.0 - 1.3 10e9/L Final    Absolute Eosinophils 0.2 0.0 - 0.7 10e9/L Final    Absolute Basophils 0.0 0.0 - 0.2 10e9/L Final    Absolute Metamyelocytes 0.1 (H) 0 10e9/L Final    Absolute Myelocytes 0.1 (H) 0 10e9/L Final    Absolute Nucleated RBC 0.1  Final    RBC Morphology Normal  Final    Platelet Estimate   Final    Automated count confirmed.  Platelet morphology is normal.         2/24/2018  9:57 PM      Component Results     Component Value Ref Range & Units Status    Sodium 140 133 - 144 mmol/L Final    Potassium 3.8 3.4 - 5.3 mmol/L Final    Chloride 107 94 - 109 mmol/L Final    Carbon Dioxide 23 20 - 32 mmol/L Final    Anion Gap 10 3 - 14 mmol/L Final    Glucose 74 70 - 99 mg/dL Final    Urea Nitrogen 17 7 - 30 mg/dL Final    Creatinine 0.56 0.52 - 1.04 mg/dL Final    GFR Estimate >90 >60 mL/min/1.7m2 Final    Non  GFR Calc    GFR Estimate If Black >90 >60 mL/min/1.7m2 Final    African American GFR Calc    Calcium 8.6 8.5 - 10.1 mg/dL Final         2/24/2018  9:45 PM      Component Results     Component Value Ref Range & Units Status    Lactic Acid 0.7 0.7 - 2.0 mmol/L Final         2/24/2018 11:03 PM      Component Results     Component Value Ref Range & Units Status    Color Urine Yellow  Final    Appearance Urine Clear  Final    Glucose Urine Negative NEG^Negative mg/dL Final    Bilirubin Urine Negative NEG^Negative Final    Ketones Urine Negative NEG^Negative mg/dL Final    Specific Gravity Urine 1.018 1.003 - 1.035 Final    Blood Urine Moderate (A) NEG^Negative Final    pH Urine 6.5 5.0 - 7.0 pH Final    Protein Albumin Urine 30 (A) NEG^Negative mg/dL Final    Urobilinogen mg/dL Normal 0.0 - 2.0 mg/dL Final    Nitrite Urine Negative NEG^Negative Final    Leukocyte Esterase  Urine Large (A) NEG^Negative Final    Source Midstream Urine  Final    WBC Urine 56 (H) 0 - 2 /HPF Final    RBC Urine 65 (H) 0 - 2 /HPF Final    Squamous Epithelial /HPF Urine 1 0 - 1 /HPF Final    Mucous Urine Present (A) NEG^Negative /LPF Final         2/24/2018 10:41 PM         2/24/2018 10:52 PM         2/24/2018 10:57 PM               2/24/2018 11:08 PM      Narrative     PELVIC ULTRASOUND  WITH TRANSABDOMINAL TRANSDUCER  2/24/2018 10:25 PM     HISTORY: Pelvic pain - delivered 4 days ago, evaluate for retained  products of conception.     TECHNIQUE:  Transabdominal sonography was performed to evaluate the  uterus and ovaries.      COMPARISON: None.    FINDINGS:  The uterus is enlarged measuring 18.1 x 13.0 x 9.4 cm, as  expected for the recent postpartum status. Endometrial stripe contains  some hyperechoic structures likely representing calcifications. This  is not abnormally distended measuring up to 0.8 cm. No definite  retained products of conception are seen.    The ovaries are normal in appearance measuring 3.0 x 3.5 x 1.2 cm on  the right and 3.1 x 2.0 x 1.9 cm on the left. No abnormal free fluid  collections are identified.        Impression     IMPRESSION:  1. Mildly heterogeneous endometrial stripe could be due to some  calcifications postpartum. No evidence for retained products of  conception.  2. Large uterus as expected status post delivery.    ARRON JANSEN MD                Clinical Quality Measure: Blood Pressure Screening     Your blood pressure was checked while you were in the emergency department today. The last reading we obtained was  BP: 126/88 . Please read the guidelines below about what these numbers mean and what you should do about them.  If your systolic blood pressure (the top number) is less than 120 and your diastolic blood pressure (the bottom number) is less than 80, then your blood pressure is normal. There is nothing more that you need to do about it.  If your systolic blood  pressure (the top number) is 120-139 or your diastolic blood pressure (the bottom number) is 80-89, your blood pressure may be higher than it should be. You should have your blood pressure rechecked within a year by a primary care provider.  If your systolic blood pressure (the top number) is 140 or greater or your diastolic blood pressure (the bottom number) is 90 or greater, you may have high blood pressure. High blood pressure is treatable, but if left untreated over time it can put you at risk for heart attack, stroke, or kidney failure. You should have your blood pressure rechecked by a primary care provider within the next 4 weeks.  If your provider in the emergency department today gave you specific instructions to follow-up with your doctor or provider even sooner than that, you should follow that instruction and not wait for up to 4 weeks for your follow-up visit.        Thank you for choosing [facility]       Thank you for choosing [facility] for your care. Our goal is always to provide you with excellent care. Hearing back from our patients is one way we can continue to improve our services. Please take a few minutes to complete the written survey that you may receive in the mail after you visit with us. Thank you!        [employer] Information     [employer] gives you secure access to your electronic health record. If you see a primary care provider, you can also send messages to your care team and make appointments. If you have questions, please call your primary care clinic.  If you do not have a primary care provider, please call [phone] and they will assist you.        Care EveryWhere ID     This is your Care EveryWhere ID. This could be used by other organizations to access your [facility] medical records  [id number]        Equal Access to Services     ADEOLA IBRAHIM : falguni Fuchs, cayetano abdullahi. So [facility]  [phone].    ATENCIÓN: Si habla español, tiene a mishra disposición servicios gratuitos de asistencia lingüística. Llame al [phone].    We comply with applicable federal civil rights laws and [place] laws. We do not discriminate on the basis of race, color, national origin, age, disability, sex, sexual orientation, or gender identity.            After Visit Summary       This is your record. Keep this with you and show to your community pharmacist(s) and doctor(s) at your next visit.

## 2018-02-24 NOTE — MR AVS SNAPSHOT
After Visit Summary   2/24/2018    Haritha Bateman    MRN: [id number]           Patient Information     Date Of Birth          1990        Visit Information        Provider Department      2/24/2018 12:50 PM Sudeep Fernandez MD [facility]        Today's Diagnoses     Dysuria    -  1       Follow-ups after your visit        Who to contact     If you have questions or need follow up information about today's clinic visit or your schedule please contact [facility] directly at [phone].  Normal or non-critical lab and imaging results will be communicated to you by [employer], letter or phone within 4 business days after the clinic has received the results. If you do not hear from us within 7 days, please contact the clinic through [employer] or phone. If you have a critical or abnormal lab result, we will notify you by phone as soon as possible.  Submit refill requests through [employer] or call your pharmacy and they will forward the refill request to us. Please allow 3 business days for your refill to be completed.          Additional Information About Your Visit        MyChart Information     [employer] gives you secure access to your electronic health record. If you see a primary care provider, you can also send messages to your care team and make appointments. If you have questions, please call your primary care clinic.  If you do not have a primary care provider, please call [phone] and they will assist you.        Care EveryWhere ID     This is your Care EveryWhere ID. This could be used by other organizations to access your [facility] medical records  [id number]        Your Vitals Were     Pulse Temperature Respirations Last Period BMI (Body Mass Index)       94 98.7  F (37.1  C) (Oral) 20 05/04/2017 30.69 kg/m2        Blood Pressure from Last 3 Encounters:   02/24/18 (!) 132/92   02/23/18 122/76   02/22/18 112/67    Weight from Last 3  Encounters:   02/24/18 184 lb 6.4 oz (83.6 kg)   02/23/18 187 lb (84.8 kg)   02/20/18 189 lb (85.7 kg)              We Performed the Following     CBC with platelets and differential     UA without Microscopic     Urine Culture Aerobic Bacterial          Today's Medication Changes          These changes are accurate as of 2/24/18  6:52 PM.  If you have any questions, ask your nurse or doctor.               Start taking these medicines.        Dose/Directions    amoxicillin-clavulanate 875-125 MG per tablet   Commonly known as:  AUGMENTIN   Used for:  Dysuria   Started by:  Sudeep Fernandez MD        Dose:  1 tablet   Take 1 tablet by mouth 2 times daily   Quantity:  28 tablet   Refills:  0            Where to get your medicines      These medications were sent to [facility] [address] [address]     Phone:  [phone]     amoxicillin-clavulanate 875-125 MG per tablet                Primary Care Provider Fax #    Physician No Ref-Primary [phone]       No address on file        Equal Access to Services     [facility]: Hadii rupinder escobar hadasho Soleoncio, waaxda luqadaha, qaybta kaalmada adeegyamelchor, cayetano amador . So [facility] [phone].    ATENCIÓN: Si habla español, tiene a mishra disposición servicios gratuitos de asistencia lingüística. Llame al [phone].    We comply with applicable federal civil rights laws and [place] laws. We do not discriminate on the basis of race, color, national origin, age, disability, sex, sexual orientation, or gender identity.            Thank you!     Thank you for choosing [facility]  for your care. Our goal is always to provide you with excellent care. Hearing back from our patients is one way we can continue to improve our services. Please take a few minutes to complete the written survey that you may receive in the mail after your visit with us. Thank  you!             Your Updated Medication List - Protect others around you: Learn how to safely use, store and throw away your medicines at www.disposemymeds.org.          This list is accurate as of 18  6:52 PM.  Always use your most recent med list.                   Brand Name Dispense Instructions for use Diagnosis    amoxicillin-clavulanate 875-125 MG per tablet    AUGMENTIN    28 tablet    Take 1 tablet by mouth 2 times daily    Dysuria       ibuprofen 200 MG tablet    ADVIL/MOTRIN    60 tablet    Take 3-4 tablets (600-800 mg) by mouth every 8 hours as needed for pain     (normal spontaneous vaginal delivery)       PRENATAL PLUS 27-1 MG Tabs     100 tablet    Take 1 tablet by mouth daily    Supervision of high risk pregnancy in third trimester, 33 weeks gestation of pregnancy       TYLENOL PO

## 2018-02-25 LAB
BACTERIA SPEC CULT: NORMAL
SPECIMEN SOURCE: NORMAL

## 2018-02-25 NOTE — ED PROVIDER NOTES
History     Chief Complaint:  Vaginal Bleeding    RAFIA Bateman is a 27 year old female four days status post vaginal delivery who presents to the Emergency Department for evaluation of vaginal bleeding. The patient was seen at urgent care this morning for evaluation of foul smelling vaginal discharge, was placed on Augmentin and told to follow up with OB GYN on Monday. The patient presents to the ED with subjective fevers, lower abdominal pain and persistent vaginal bleeding, filling approximately two maternity pads per hour with large clots. She also notes mild shortness of breath. She denies any loss of consciousness or any other associated symptoms.     Allergies:  No known drug allergies    Medications:    Tylenol  Augmentin  Ibuprofen  Prenatal vitamin    Past Medical History:    Normal spontaneous vaginal delivery  Anemia complicating pregnancy in third trimester  High risk pregnancy    Past Surgical History:    The patient does not have any pertinent past surgical history    Family History:    Hypertension: mother    Social History:  The patient was accompanied to the ED by .  Smoking Status: never smoker  Smokeless Tobacco: never used  Alcohol Use: no  Marital Status:   [2]     Review of Systems   Gastrointestinal: Negative for abdominal pain.   Genitourinary: Positive for vaginal bleeding and vaginal discharge. Negative for vaginal pain.   Neurological: Negative for syncope.   All other systems reviewed and are negative.    Physical Exam   First Vitals:  BP: 140/89  Pulse: 100  Temp: 99.2  F (37.3  C)  Resp: 18  Weight: 86 kg (189 lb 9.6 oz)  SpO2: 100 %    Physical Exam  General: Resting on the gurney, appears comfortable.   Head:  The scalp, face, and head appear normal  Mouth/Throat: Mucus membranes are moist  CV:  Regular rate    Normal S1 and S2  No pathological murmur   Resp:  Breath sounds clear and equal bilaterally    Non-labored, no retractions or accessory muscle use    No  coarseness    No wheezing   GI:  Abdomen is soft, no rigidity    Minimal suprapubic tenderness to palpation.   :  Ongoing vaginal bleeding   MS:  Normal motor assessment of all extremities.    Good capillary refill noted.  Skin:   No rash or lesions noted.  Neuro:  Speech is normal and fluent. No apparent deficit.  Psych:  Awake. Alert.  Normal affect.      Appropriate interactions.    Emergency Department Course     Imaging:  Radiographic findings were communicated with the patient who voiced understanding of the findings.    US pelvic complete without transvaginal:   1. Mildly heterogeneous endometrial stripe could be due to some  calcifications postpartum. No evidence for retained products of  conception.  2. Large uterus as expected status post delivery. As per radiology.     Laboratory:  CBC: WBC: 11.0, HGB: 11.7, PLT: 196  BMP: o/w WNL (Creat 0.56, glucose 74)  Lactic acid: 0.7    UA with microscopic: moderate blood, protein albumin 30, large leukocyte esterase, WBC 56(H), RBC 65(H), mucous present, o/w WNL.   Urine culture: in process    Blood culture: in process  Blood culture: in process    Emergency Department Course:  Nursing notes and vitals reviewed. I performed an exam of the patient as documented above.      The patient was sent for a US pelvic complete with transvaginal while here in the emergency department, findings above.    Blood drawn. This was sent to the lab for further testing, results above.    The patient provided a urine sample here in the emergency department. This was sent for laboratory testing, findings above.    The patient had a pelvic exam performed here in the emergency department, which she tolerated without difficulty. This was done in the presence of a female chaperone.    2314 I reassessed the patient.     2327 I reassessed the patient.     Findings and plan explained to the Patient. Patient discharged home with instructions regarding supportive care, medications, and reasons  to return. The importance of close follow-up was reviewed.     Impression & Plan      Medical Decision Making:  Haritha Bateman is a 27 year old female presents for evaluation of increased vaginal discharge and bleeding as well as fevers at home.  The patient has recently started Augmentin out of concern for mild endometritis versus UTI diagnosed in urgent care.  Patient saw her midwife several days prior and was thought to be doing quite well within normal postpartum healing.  Review of her pathology from her placenta does show potential mild chorioamnionitis.  She is currently stable has normal white count is afebrile in the emergency department and has been taking Augmentin as instructed.  I am comfortable to continue Augmentin and following closely.  She is not currently having heavy bleeding will return for heavy bleeding foul odor (her vaginal odor today is not foul) or any worsening symptoms.  The patient and her  are comfortable with this plan and she is discharged in good condition.  She will follow up with her OB provider on Monday.      Diagnosis:    ICD-10-CM    1. Postpartum fever O86.4      Disposition:  discharged to home    IAdilene, am serving as a scribe on 2/24/2018 at 9:18 PM to personally document services performed by Shauna White MD based on my observations and the provider's statements to me.   Adilene Ruelas  2/24/2018    EMERGENCY DEPARTMENT       Shauna White MD  02/24/18 3929

## 2018-02-26 ENCOUNTER — APPOINTMENT (OUTPATIENT)
Dept: CT IMAGING | Facility: CLINIC | Age: 28
DRG: 776 | End: 2018-02-26
Attending: EMERGENCY MEDICINE
Payer: COMMERCIAL

## 2018-02-26 ENCOUNTER — HOSPITAL ENCOUNTER (INPATIENT)
Facility: CLINIC | Age: 28
LOS: 3 days | Discharge: HOME | DRG: 776 | End: 2018-03-01
Attending: EMERGENCY MEDICINE | Admitting: OBSTETRICS & GYNECOLOGY
Payer: COMMERCIAL

## 2018-02-26 PROBLEM — N71.9 ENDOMETRITIS: Status: ACTIVE | Noted: 2018-02-26

## 2018-02-26 LAB
ALBUMIN SERPL-MCNC: 2.7 G/DL (ref 3.4–5)
ALP SERPL-CCNC: 203 U/L (ref 40–150)
ALT SERPL W P-5'-P-CCNC: 71 U/L (ref 0–50)
ANION GAP SERPL CALCULATED.3IONS-SCNC: 7 MMOL/L (ref 3–14)
AST SERPL W P-5'-P-CCNC: 40 U/L (ref 0–45)
BACTERIA SPEC CULT: NO GROWTH
BASOPHILS # BLD AUTO: 0.1 10E9/L (ref 0–0.2)
BASOPHILS NFR BLD AUTO: 1 %
BILIRUB SERPL-MCNC: 0.2 MG/DL (ref 0.2–1.3)
BUN SERPL-MCNC: 17 MG/DL (ref 7–30)
CALCIUM SERPL-MCNC: 8.7 MG/DL (ref 8.5–10.1)
CHLORIDE SERPL-SCNC: 108 MMOL/L (ref 94–109)
CO2 SERPL-SCNC: 24 MMOL/L (ref 20–32)
CREAT SERPL-MCNC: 0.58 MG/DL (ref 0.52–1.04)
DIFFERENTIAL METHOD BLD: ABNORMAL
EOSINOPHIL # BLD AUTO: 0.1 10E9/L (ref 0–0.7)
EOSINOPHIL NFR BLD AUTO: 1 %
ERYTHROCYTE [DISTWIDTH] IN BLOOD BY AUTOMATED COUNT: 15.6 % (ref 10–15)
GFR SERPL CREATININE-BSD FRML MDRD: >90 ML/MIN/1.7M2
GLUCOSE SERPL-MCNC: 76 MG/DL (ref 70–99)
HCT VFR BLD AUTO: 36 % (ref 35–47)
HGB BLD-MCNC: 12.2 G/DL (ref 11.7–15.7)
LACTATE BLD-SCNC: 0.8 MMOL/L (ref 0.7–2)
LYMPHOCYTES # BLD AUTO: 1.8 10E9/L (ref 0.8–5.3)
LYMPHOCYTES NFR BLD AUTO: 20 %
Lab: NORMAL
MCH RBC QN AUTO: 30.7 PG (ref 26.5–33)
MCHC RBC AUTO-ENTMCNC: 33.9 G/DL (ref 31.5–36.5)
MCV RBC AUTO: 91 FL (ref 78–100)
MONOCYTES # BLD AUTO: 0.5 10E9/L (ref 0–1.3)
MONOCYTES NFR BLD AUTO: 5 %
MYELOCYTES # BLD: 0.4 10E9/L
MYELOCYTES NFR BLD MANUAL: 4 %
NEUTROPHILS # BLD AUTO: 6.3 10E9/L (ref 1.6–8.3)
NEUTROPHILS NFR BLD AUTO: 69 %
PLATELET # BLD AUTO: 230 10E9/L (ref 150–450)
PLATELET # BLD EST: ABNORMAL 10*3/UL
POTASSIUM SERPL-SCNC: 3.7 MMOL/L (ref 3.4–5.3)
PROT SERPL-MCNC: 7 G/DL (ref 6.8–8.8)
RBC # BLD AUTO: 3.98 10E12/L (ref 3.8–5.2)
RBC INCLUSIONS BLD: SLIGHT
SODIUM SERPL-SCNC: 139 MMOL/L (ref 133–144)
SPECIMEN SOURCE: NORMAL
WBC # BLD AUTO: 9.2 10E9/L (ref 4–11)

## 2018-02-26 PROCEDURE — 25000128 H RX IP 250 OP 636: Performed by: OBSTETRICS & GYNECOLOGY

## 2018-02-26 PROCEDURE — 25000128 H RX IP 250 OP 636

## 2018-02-26 PROCEDURE — 25000125 ZZHC RX 250: Performed by: EMERGENCY MEDICINE

## 2018-02-26 PROCEDURE — 12000037 ZZH R&B POSTPARTUM INTERMEDIATE

## 2018-02-26 PROCEDURE — 85025 COMPLETE CBC W/AUTO DIFF WBC: CPT | Performed by: EMERGENCY MEDICINE

## 2018-02-26 PROCEDURE — 80053 COMPREHEN METABOLIC PANEL: CPT | Performed by: EMERGENCY MEDICINE

## 2018-02-26 PROCEDURE — 96376 TX/PRO/DX INJ SAME DRUG ADON: CPT

## 2018-02-26 PROCEDURE — 25000132 ZZH RX MED GY IP 250 OP 250 PS 637: Performed by: OBSTETRICS & GYNECOLOGY

## 2018-02-26 PROCEDURE — 99285 EMERGENCY DEPT VISIT HI MDM: CPT | Mod: 25

## 2018-02-26 PROCEDURE — 25000128 H RX IP 250 OP 636: Performed by: EMERGENCY MEDICINE

## 2018-02-26 PROCEDURE — 87040 BLOOD CULTURE FOR BACTERIA: CPT | Performed by: EMERGENCY MEDICINE

## 2018-02-26 PROCEDURE — 96375 TX/PRO/DX INJ NEW DRUG ADDON: CPT

## 2018-02-26 PROCEDURE — 25000125 ZZHC RX 250: Performed by: OBSTETRICS & GYNECOLOGY

## 2018-02-26 PROCEDURE — 74177 CT ABD & PELVIS W/CONTRAST: CPT

## 2018-02-26 PROCEDURE — 36415 COLL VENOUS BLD VENIPUNCTURE: CPT

## 2018-02-26 PROCEDURE — 96365 THER/PROPH/DIAG IV INF INIT: CPT | Mod: 59

## 2018-02-26 PROCEDURE — 96367 TX/PROPH/DG ADDL SEQ IV INF: CPT | Mod: 59

## 2018-02-26 PROCEDURE — 99214 OFFICE O/P EST MOD 30 MIN: CPT | Performed by: OBSTETRICS & GYNECOLOGY

## 2018-02-26 PROCEDURE — 83605 ASSAY OF LACTIC ACID: CPT | Performed by: EMERGENCY MEDICINE

## 2018-02-26 RX ORDER — LANOLIN 100 %
OINTMENT (GRAM) TOPICAL
Status: DISCONTINUED | OUTPATIENT
Start: 2018-02-26 | End: 2018-03-01 | Stop reason: HOSPADM

## 2018-02-26 RX ORDER — ACETAMINOPHEN 325 MG/1
975 TABLET ORAL EVERY 8 HOURS
Status: COMPLETED | OUTPATIENT
Start: 2018-02-26 | End: 2018-03-01

## 2018-02-26 RX ORDER — DIPHENHYDRAMINE HCL 25 MG
25 CAPSULE ORAL EVERY 6 HOURS PRN
Status: DISCONTINUED | OUTPATIENT
Start: 2018-02-26 | End: 2018-03-01 | Stop reason: HOSPADM

## 2018-02-26 RX ORDER — AMOXICILLIN 250 MG
2 CAPSULE ORAL 2 TIMES DAILY PRN
Status: DISCONTINUED | OUTPATIENT
Start: 2018-02-26 | End: 2018-02-28

## 2018-02-26 RX ORDER — IOPAMIDOL 755 MG/ML
95 INJECTION, SOLUTION INTRAVASCULAR ONCE
Status: COMPLETED | OUTPATIENT
Start: 2018-02-26 | End: 2018-02-26

## 2018-02-26 RX ORDER — CLINDAMYCIN PHOSPHATE 900 MG/50ML
900 INJECTION, SOLUTION INTRAVENOUS ONCE
Status: COMPLETED | OUTPATIENT
Start: 2018-02-26 | End: 2018-02-26

## 2018-02-26 RX ORDER — IBUPROFEN 600 MG/1
600 TABLET, FILM COATED ORAL EVERY 6 HOURS PRN
Status: DISCONTINUED | OUTPATIENT
Start: 2018-02-26 | End: 2018-03-01 | Stop reason: HOSPADM

## 2018-02-26 RX ORDER — AMOXICILLIN 250 MG
1 CAPSULE ORAL 2 TIMES DAILY PRN
Status: DISCONTINUED | OUTPATIENT
Start: 2018-02-26 | End: 2018-02-28

## 2018-02-26 RX ORDER — LIDOCAINE 40 MG/G
CREAM TOPICAL
Status: DISCONTINUED | OUTPATIENT
Start: 2018-02-26 | End: 2018-03-01 | Stop reason: HOSPADM

## 2018-02-26 RX ORDER — HYDROMORPHONE HYDROCHLORIDE 1 MG/ML
0.5 INJECTION, SOLUTION INTRAMUSCULAR; INTRAVENOUS; SUBCUTANEOUS EVERY 30 MIN PRN
Status: DISCONTINUED | OUTPATIENT
Start: 2018-02-26 | End: 2018-03-01 | Stop reason: HOSPADM

## 2018-02-26 RX ORDER — CLINDAMYCIN PHOSPHATE 900 MG/50ML
900 INJECTION, SOLUTION INTRAVENOUS EVERY 8 HOURS
Status: DISCONTINUED | OUTPATIENT
Start: 2018-02-26 | End: 2018-02-28

## 2018-02-26 RX ORDER — HYDROCORTISONE 2.5 %
CREAM (GRAM) TOPICAL 3 TIMES DAILY PRN
Status: DISCONTINUED | OUTPATIENT
Start: 2018-02-26 | End: 2018-03-01 | Stop reason: HOSPADM

## 2018-02-26 RX ORDER — BISACODYL 10 MG
10 SUPPOSITORY, RECTAL RECTAL DAILY PRN
Status: DISCONTINUED | OUTPATIENT
Start: 2018-02-28 | End: 2018-02-28

## 2018-02-26 RX ORDER — SIMETHICONE 80 MG
80 TABLET,CHEWABLE ORAL 4 TIMES DAILY PRN
Status: DISCONTINUED | OUTPATIENT
Start: 2018-02-26 | End: 2018-03-01 | Stop reason: HOSPADM

## 2018-02-26 RX ORDER — ACETAMINOPHEN 325 MG/1
650 TABLET ORAL EVERY 4 HOURS PRN
Status: DISCONTINUED | OUTPATIENT
Start: 2018-03-01 | End: 2018-03-01 | Stop reason: HOSPADM

## 2018-02-26 RX ORDER — NALOXONE HYDROCHLORIDE 0.4 MG/ML
.1-.4 INJECTION, SOLUTION INTRAMUSCULAR; INTRAVENOUS; SUBCUTANEOUS
Status: DISCONTINUED | OUTPATIENT
Start: 2018-02-26 | End: 2018-03-01 | Stop reason: HOSPADM

## 2018-02-26 RX ORDER — HYDROMORPHONE HYDROCHLORIDE 1 MG/ML
INJECTION, SOLUTION INTRAMUSCULAR; INTRAVENOUS; SUBCUTANEOUS
Status: COMPLETED
Start: 2018-02-26 | End: 2018-02-26

## 2018-02-26 RX ORDER — DIPHENHYDRAMINE HYDROCHLORIDE 50 MG/ML
25 INJECTION INTRAMUSCULAR; INTRAVENOUS EVERY 6 HOURS PRN
Status: DISCONTINUED | OUTPATIENT
Start: 2018-02-26 | End: 2018-03-01 | Stop reason: HOSPADM

## 2018-02-26 RX ORDER — IBUPROFEN 400 MG/1
800 TABLET, FILM COATED ORAL EVERY 6 HOURS PRN
Status: DISCONTINUED | OUTPATIENT
Start: 2018-02-26 | End: 2018-03-01 | Stop reason: HOSPADM

## 2018-02-26 RX ORDER — ONDANSETRON 2 MG/ML
INJECTION INTRAMUSCULAR; INTRAVENOUS
Status: COMPLETED
Start: 2018-02-26 | End: 2018-02-26

## 2018-02-26 RX ORDER — OXYCODONE HYDROCHLORIDE 5 MG/1
5-10 TABLET ORAL
Status: DISCONTINUED | OUTPATIENT
Start: 2018-02-26 | End: 2018-03-01 | Stop reason: HOSPADM

## 2018-02-26 RX ORDER — IBUPROFEN 400 MG/1
400 TABLET, FILM COATED ORAL EVERY 6 HOURS PRN
Status: DISCONTINUED | OUTPATIENT
Start: 2018-02-26 | End: 2018-03-01 | Stop reason: HOSPADM

## 2018-02-26 RX ADMIN — IBUPROFEN 800 MG: 400 TABLET ORAL at 22:22

## 2018-02-26 RX ADMIN — OXYCODONE HYDROCHLORIDE 5 MG: 5 TABLET ORAL at 22:56

## 2018-02-26 RX ADMIN — IOPAMIDOL 95 ML: 755 INJECTION, SOLUTION INTRAVENOUS at 12:42

## 2018-02-26 RX ADMIN — CLINDAMYCIN PHOSPHATE 900 MG: 18 INJECTION, SOLUTION INTRAVENOUS at 13:17

## 2018-02-26 RX ADMIN — TAZOBACTAM SODIUM AND PIPERACILLIN SODIUM 4.5 G: 500; 4 INJECTION, SOLUTION INTRAVENOUS at 12:17

## 2018-02-26 RX ADMIN — ONDANSETRON 4 MG: 2 INJECTION INTRAMUSCULAR; INTRAVENOUS at 11:42

## 2018-02-26 RX ADMIN — OXYCODONE HYDROCHLORIDE 5 MG: 5 TABLET ORAL at 18:38

## 2018-02-26 RX ADMIN — TAZOBACTAM SODIUM AND PIPERACILLIN SODIUM 3.38 G: 375; 3 INJECTION, SOLUTION INTRAVENOUS at 18:45

## 2018-02-26 RX ADMIN — CLINDAMYCIN PHOSPHATE 900 MG: 18 INJECTION, SOLUTION INTRAVENOUS at 21:14

## 2018-02-26 RX ADMIN — SENNOSIDES AND DOCUSATE SODIUM 2 TABLET: 8.6; 5 TABLET ORAL at 21:09

## 2018-02-26 RX ADMIN — SODIUM CHLORIDE 1000 ML: 9 INJECTION, SOLUTION INTRAVENOUS at 12:08

## 2018-02-26 RX ADMIN — ACETAMINOPHEN 975 MG: 325 TABLET, FILM COATED ORAL at 16:23

## 2018-02-26 RX ADMIN — HYDROMORPHONE HYDROCHLORIDE 0.5 MG: 1 INJECTION, SOLUTION INTRAMUSCULAR; INTRAVENOUS; SUBCUTANEOUS at 12:57

## 2018-02-26 RX ADMIN — Medication 0.5 MG: at 11:42

## 2018-02-26 RX ADMIN — SODIUM CHLORIDE 69 ML: 9 INJECTION, SOLUTION INTRAVENOUS at 12:43

## 2018-02-26 ASSESSMENT — ENCOUNTER SYMPTOMS: ABDOMINAL PAIN: 1

## 2018-02-26 NOTE — PROGRESS NOTES
H & P dictated  Patient is 6 days postpartum , increasing pelvic pain over uterus, not improving on outpatient antibiotics  Admitted for iv antibiotics  CT showed enhancement on posterior endometrial lining and path from placenta showed chorioamnionitis.   WBC has actually decreased in 2 days but symptoms getting worse.   We have not documented a fever yet but patient said she had fever and chills at home.   Patient of our midwife service, I am on call today and admitting this patient.

## 2018-02-26 NOTE — ED NOTES
"[facility]  ED Nurse Handoff Report    ED Chief complaint: Abdominal Pain (seen over the weekend for post partum fever, on AB.  Worsening pain today.  Recently took ibuprofen. )      ED Diagnosis:   Final diagnoses:   Postpartum endometritis       Code Status: Full Code    Allergies: No Known Allergies    Activity level - Baseline/Home:  Independent    Activity Level - Current:   Independent     Needed?: No    Isolation: No  Infection: Not Applicable    Bariatric?: No    Vital Signs:   Vitals:    02/26/18 1106 02/26/18 1143   BP: (!) 157/99 (!) (P) 128/92   Resp: 18 (P) 16   Temp: 98.8  F (37.1  C)    TempSrc: Oral    SpO2: 100% 100%   Weight: 85.7 kg (189 lb)    Height: 1.651 m (5' 5\")        Cardiac Rhythm: ,        Pain level: 0-10 Pain Scale: 9    Is this patient confused?: No    Patient Report: Haritha had a vaginal delivery on feb 20th. She was found to have an infected placenta and is currently on augmentin. Her pain has gotten worse. She is feeling better with IV dilaudid. BCX2 have been drawn, await abd ct. VSS. She is receiving zosyn and then will get clindamycin as well as a liter of NS. She is breastfeeding. She states the vaginal bleeding has been less.    Family Comments:  here    OBS brochure/video discussed/provided to patient: No    ED Medications:   Medications   HYDROmorphone (PF) (DILAUDID) injection 0.5 mg (not administered)   0.9% sodium chloride BOLUS (1,000 mLs Intravenous New Bag 2/26/18 1208)   piperacillin-tazobactam (ZOSYN) intermittent infusion 4.5 g (4.5 g Intravenous New Bag 2/26/18 1217)   clindamycin (CLEOCIN) infusion 900 mg (not administered)   HYDROmorphone (PF) (DILAUDID) 0.5 MG/0.5 ML injection (0.5 mg  Given 2/26/18 1142)   ondansetron (ZOFRAN) 2 MG/ML injection (4 mg  Given 2/26/18 1142)       Drips infusing?:  No      ED NURSE PHONE NUMBER: [phone]         "

## 2018-02-26 NOTE — PHARMACY-ADMISSION MEDICATION HISTORY
Admission medication history interview status for the 2/26/2018  admission is complete. See EPIC admission navigator for prior to admission medications     Medication history source reliability:Good    Actions taken by pharmacist (provider contacted, etc):None     Additional medication history information not noted on PTA med list :None    Medication reconciliation/reorder completed by provider prior to medication history? No    Time spent in this activity: 10 minutes    Prior to Admission medications    Medication Sig Last Dose Taking? Auth Provider   Acetaminophen (TYLENOL PO) Take 1,000 mg by mouth every 8 hours as needed  2/26/2018 at am Yes Reported, Patient   amoxicillin-clavulanate (AUGMENTIN) 875-125 MG per tablet Take 1 tablet by mouth 2 times daily 2/26/2018 at am x 1 dose Yes Sudeep Fernandez MD   ibuprofen (ADVIL/MOTRIN) 200 MG tablet Take 3-4 tablets (600-800 mg) by mouth every 8 hours as needed for pain 2/26/2018 at am x 800mg Yes Elinor Buck APRN CNM   Prenatal Vit-Fe Fumarate-FA (PRENATAL PLUS) 27-1 MG TABS Take 1 tablet by mouth daily 2/25/2018 at Unknown time Yes Romi Goldsmith APRN CNM

## 2018-02-26 NOTE — PLAN OF CARE
Problem: Infection, Risk/Actual (Adult)  Goal: Identify Related Risk Factors and Signs and Symptoms  Related risk factors and signs and symptoms are identified upon initiation of [facility] Clinical Practice Guideline (CPG).  Outcome: No Change  Pt. arrived on floor at 1400. Readmitted for endometriosis. Had a vag. delivery on 2/20. She states her bldg. is minimal. Lily care supplies brought into pt. VS done. Amb. to bed with SBA. Denies pain. Saline lock in place. Will be on Clindamycin every 8 hrs. and  Zosyn every 12 hrs. Pt. is breastfeeding her baby.  is going to bring baby to hospital and is aware that he needs to remain with infant at all times. Rooming in agreement signed. Pt. instructed to call for assist to BR. Is going to order lunch.

## 2018-02-26 NOTE — IP AVS SNAPSHOT
[address]    [place] [address]    Phone:  [phone]                                       After Visit Summary   2/26/2018    Haritha Bateamn    MRN: [id number]           After Visit Summary Signature Page     I have received my discharge instructions, and my questions have been answered. I have discussed any challenges I see with this plan with the nurse or doctor.    ..........................................................................................................................................  Patient/Patient Representative Signature      ..........................................................................................................................................  Patient Representative Print Name and Relationship to Patient    ..................................................               ................................................  Date                                            Time    ..........................................................................................................................................  Reviewed by Signature/Title    ...................................................              ..............................................  Date                                                            Time

## 2018-02-26 NOTE — IP AVS SNAPSHOT
MRN:[id number]                      After Visit Summary   2/26/2018    Haritha Bateman    MRN: [id number]           Thank you!     Thank you for choosing [facility] for your care. Our goal is always to provide you with excellent care. Hearing back from our patients is one way we can continue to improve our services. Please take a few minutes to complete the written survey that you may receive in the mail after you visit with us. Thank you!        Patient Information     Date Of Birth          1990        Designated Caregiver       Most Recent Value    Caregiver    Name of designated caregiver Leoncio    Phone number of caregiver [phone]      About your hospital stay     You were admitted on:  February 26, 2018 You last received care in the:  [address]    You were discharged on:  March 1, 2018        Reason for your hospital stay       Infection in uterus                  Who to Call     For medical emergencies, please call 911.  For non-urgent questions about your medical care, please call your primary care provider or clinic, None          Attending Provider     Provider Specialty    Radha Mcwilliams MD Emergency Medicine    [facility]       Primary Care Provider Fax #    Physician No Ref-Primary [phone]      After Care Instructions     Diet       Follow this diet upon discharge: Regular                  Follow-up Appointments     Follow-up and recommended labs and tests        Follow up with OB provider in 1 wk                  Further instructions from your care team       Endometriosis  - Call your doctor right away if you have any of the following:  - A fever above 100.4 F (38 C), with or without chills.  - Abdominal pain and swelling that get worse.  - Pain that is not relieved by your medication.  - You have vaginal discharge that smells bad or any unusual bleeding.  - Dizziness or fainting.  - Nausea and vomiting.  - You have any  "questions of concerns ounce you return home.    Pending Results     Date and Time Order Name Status Description    2/26/2018 1208 Blood culture Preliminary     2/26/2018 1208 Blood culture Preliminary     2/24/2018 2121 Blood culture Preliminary     2/24/2018 2121 Blood culture Preliminary             Statement of Approval     Ordered          03/01/18 1044  I have reviewed and agree with all the recommendations and orders detailed in this document.  EFFECTIVE NOW     Approved and electronically signed by:  Fozia Haque MD             Admission Information     Date & Time Provider Department Dept. Phone    2/26/2018 Fozia Haque MD [address] [phone]      Your Vitals Were     Blood Pressure Pulse Temperature Respirations Height Weight    140/88 79 97.9  F (36.6  C) (Oral) 16 1.651 m (5' 5\") 85.7 kg (189 lb)    Last Period Pulse Oximetry BMI (Body Mass Index)             05/04/2017 99% 31.45 kg/m2         [facility] Information     [employer] gives you secure access to your electronic health record. If you see a primary care provider, you can also send messages to your care team and make appointments. If you have questions, please call your primary care clinic.  If you do not have a primary care provider, please call [phone] and they will assist you.        Care EveryWhere ID     This is your Care EveryWhere ID. This could be used by other organizations to access your [place] medical records  [id number]        Equal Access to Services     ADEOLA IBRAHIM : Hadii rupinder chandra Soleoncio, waaxda luqadaha, qaybta kaalmada teresayada, cayetano snyder. So [facility] [phone].    ATENCIÓN: Si habla español, tiene a mishra disposición servicios gratuitos de asistencia lingüística. Llame al [phone].    We comply with applicable federal civil rights laws and [place] laws. We do not discriminate on the basis of race, color, national origin, age, disability, " sex, sexual orientation, or gender identity.               Review of your medicines      START taking        Dose / Directions    loperamide 2 MG capsule   Commonly known as:  IMODIUM   Used for:  Postpartum endometritis        Dose:  2 mg   Take 1 capsule (2 mg) by mouth 3 times daily as needed for diarrhea   Quantity:  20 capsule   Refills:  0       oxyCODONE IR 5 MG tablet   Commonly known as:  ROXICODONE   Used for:  Postpartum endometritis        Dose:  5 mg   Take 1 tablet (5 mg) by mouth every 8 hours as needed for other (pain control or improvement in physical function. Hold dose for analgesic side effects.)   Quantity:  18 tablet   Refills:  0         CONTINUE these medicines which may have CHANGED, or have new prescriptions. If we are uncertain of the size of tablets/capsules you have at home, strength may be listed as something that might have changed.        Dose / Directions    * amoxicillin-clavulanate 875-125 MG per tablet   Commonly known as:  AUGMENTIN   This may have changed:  Another medication with the same name was added. Make sure you understand how and when to take each.   Used for:  Dysuria        Dose:  1 tablet   Take 1 tablet by mouth 2 times daily   Quantity:  28 tablet   Refills:  0       * amoxicillin-clavulanate 500-125 MG per tablet   Commonly known as:  AUGMENTIN   Indication:  Endometritis   This may have changed:  You were already taking a medication with the same name, and this prescription was added. Make sure you understand how and when to take each.   Used for:  Postpartum endometritis        Dose:  1 tablet   Take 1 tablet by mouth every 8 hours   Quantity:  15 tablet   Refills:  0       * Notice:  This list has 2 medication(s) that are the same as other medications prescribed for you. Read the directions carefully, and ask your doctor or other care provider to review them with you.      CONTINUE these medicines which have NOT CHANGED        Dose / Directions    ibuprofen 200  MG tablet   Commonly known as:  ADVIL/MOTRIN   Used for:   (normal spontaneous vaginal delivery)        Dose:  600-800 mg   Take 3-4 tablets (600-800 mg) by mouth every 8 hours as needed for pain   Quantity:  60 tablet   Refills:  1       PRENATAL PLUS 27-1 MG Tabs   Used for:  Supervision of high risk pregnancy in third trimester, 33 weeks gestation of pregnancy        Dose:  1 tablet   Take 1 tablet by mouth daily   Quantity:  100 tablet   Refills:  3       TYLENOL PO        Dose:  1000 mg   Take 1,000 mg by mouth every 8 hours as needed   Refills:  0            Where to get your medicines      Some of these will need a paper prescription and others can be bought over the counter. Ask your nurse if you have questions.     Bring a paper prescription for each of these medications     amoxicillin-clavulanate 500-125 MG per tablet    loperamide 2 MG capsule    oxyCODONE IR 5 MG tablet                Protect others around you: Learn how to safely use, store and throw away your medicines at www.disposemymeds.org.        ANTIBIOTIC INSTRUCTION     You've Been Prescribed an Antibiotic - Now What?  Your healthcare team thinks that you or your loved one might have an infection. Some infections can be treated with antibiotics, which are powerful, life-saving drugs. Like all medications, antibiotics have side effects and should only be used when necessary. There are some important things you should know about your antibiotic treatment.      Your healthcare team may run tests before you start taking an antibiotic.    Your team may take samples (e.g., from your blood, urine or other areas) to run tests to look for bacteria. These test can be important to determine if you need an antibiotic at all and, if you do, which antibiotic will work best.      Within a few days, your healthcare team might change or even stop your antibiotic.    Your team may start you on an antibiotic while they are working to find out what is making  you sick.    Your team might change your antibiotic because test results show that a different antibiotic would be better to treat your infection.    In some cases, once your team has more information, they learn that you do not need an antibiotic at all. They may find out that you don't have an infection, or that the antibiotic you're taking won't work against your infection. For example, an infection caused by a virus can't be treated with antibiotics. Staying on an antibiotic when you don't need it is more likely to be harmful than helpful.      You may experience side effects from your antibiotic.    Like all medications, antibiotics have side effects. Some of these can be serious.    Let you healthcare team know if you have any known allergies when you are admitted to the hospital.    One significant side effect of nearly all antibiotics is the risk of severe and sometimes deadly diarrhea caused by Clostridium difficile (C. Difficile). This occurs when a person takes antibiotics because some good germs are destroyed. Antibiotic use allows C. diificile to take over, putting patients at high risk for this serious infection.    As a patient or caregiver, it is important to understand your or your loved one's antibiotic treatment. It is especially important for caregivers to speak up when patients can't speak for themselves. Here are some important questions to ask your healthcare team.    What infection is this antibiotic treating and how do you know I have that infection?    What side effects might occur from this antibiotic?    How long will I need to take this antibiotic?    Is it safe to take this antibiotic with other medications or supplements (e.g., vitamins) that I am taking?     Are there any special directions I need to know about taking this antibiotic? For example, should I take it with food?    How will I be monitored to know whether my infection is responding to the antibiotic?    What tests may help  to make sure the right antibiotic is prescribed for me?      Information provided by:  www.cdc.gov/getsmart  U.S. Department of Health and Human Services  Centers for disease Control and Prevention  National Center for Emerging and Zoonotic Infectious Diseases  Division of Healthcare Quality Promotion        Information about OPIOIDS     PRESCRIPTION OPIOIDS: WHAT YOU NEED TO KNOW    Prescription opioids can be used to help relieve moderate to severe pain and are often prescribed following a surgery or injury, or for certain health conditions. These medications can be an important part of treatment but also come with serious risks. It is important to work with your health care provider to make sure you are getting the safest, most effective care.    WHAT ARE THE RISKS AND SIDE EFFECTS OF OPIOID USE?  Prescription opioids carry serious risks of addiction and overdose, especially with prolonged use. An opioid overdose, often marked by slowed breathing can cause sudden death. The use of prescription opioids can have a number of side effects as well, even when taken as directed:      Tolerance - meaning you might need to take more of a medication for the same pain relief    Physical dependence - meaning you have symptoms of withdrawal when a medication is stopped    Increased sensitivity to pain    Constipation    Nausea, vomiting, and dry mouth    Sleepiness and dizziness    Confusion    Depression    Low levels of testosterone that can result in lower sex drive, energy, and strength    Itching and sweating    RISKS ARE GREATER WITH:    History of drug misuse, substance use disorder, or overdose    Mental health conditions (such as depression or anxiety)    Sleep apnea    Older age (65 years or older)    Pregnancy    Avoid alcohol while taking prescription opioids.   Also, unless specifically advised by your health care provider, medications to avoid include:    Benzodiazepines (such as Xanax or Valium)    Muscle  relaxants (such as Soma or Flexeril)    Hypnotics (such as Ambien or Lunesta)    Other prescription opioids    KNOW YOUR OPTIONS:  Talk to your health care provider about ways to manage your pain that do not involve prescription opioids. Some of these options may actually work better and have fewer risks and side effects:    Pain relievers such as acetaminophen, ibuprofen, and naproxen    Some medications that are also used for depression or seizures    Physical therapy and exercise    Cognitive behavioral therapy, a psychological, goal-directed approach, in which patients learn how to modify physical, behavioral, and emotional triggers of pain and stress    IF YOU ARE PRESCRIBED OPIOIDS FOR PAIN:    Never take opioids in greater amounts or more often than prescribed    Follow up with your primary health care provider and work together to create a plan on how to manage your pain.    Talk about ways to help manage your pain that do not involve prescription opioids    Talk about all concerns and side effects    Help prevent misuse and abuse    Never sell or share prescription opioids    Never use another person's prescription opioids    Store prescription opioids in a secure place and out of reach of others (this may include visitors, children, friends, and family)    Visit www.cdc.gov/drugoverdose to learn about risks of opioid abuse and overdose    If you believe you may be struggling with addiction, tell your health care provider and ask for guidance or call [facility]'s National Helpline at 7-134-200-HELP    LEARN MORE / www.cdc.gov/drugoverdose/prescribing/guideline.html    Safely dispose of unused prescription opioids: Find your local drug take-back programs and more information about the importance of safe disposal at www.doseofreality.[place].gov             Medication List: This is a list of all your medications and when to take them. Check marks below indicate your daily home schedule. Keep this list as a reference.       Medications           Morning Afternoon Evening Bedtime As Needed    * amoxicillin-clavulanate 875-125 MG per tablet   Commonly known as:  AUGMENTIN   Take 1 tablet by mouth 2 times daily                                * amoxicillin-clavulanate 500-125 MG per tablet   Commonly known as:  AUGMENTIN   Take 1 tablet by mouth every 8 hours   Last time this was given:  1 tablet on 3/1/2018  6:11 AM                                ibuprofen 200 MG tablet   Commonly known as:  ADVIL/MOTRIN   Take 3-4 tablets (600-800 mg) by mouth every 8 hours as needed for pain   Last time this was given:  800 mg on 3/1/2018  7:44 AM                                loperamide 2 MG capsule   Commonly known as:  IMODIUM   Take 1 capsule (2 mg) by mouth 3 times daily as needed for diarrhea   Last time this was given:  2 mg on 3/1/2018  1:42 AM                                oxyCODONE IR 5 MG tablet   Commonly known as:  ROXICODONE   Take 1 tablet (5 mg) by mouth every 8 hours as needed for other (pain control or improvement in physical function. Hold dose for analgesic side effects.)   Last time this was given:  5 mg on 3/1/2018  6:11 AM                                PRENATAL PLUS 27-1 MG Tabs   Take 1 tablet by mouth daily                                TYLENOL PO   Take 1,000 mg by mouth every 8 hours as needed   Last time this was given:  975 mg on 3/1/2018  7:45 AM                                * Notice:  This list has 2 medication(s) that are the same as other medications prescribed for you. Read the directions carefully, and ask your doctor or other care provider to review them with you.

## 2018-02-26 NOTE — H&P
Admitted:     2018      HISTORY OF PRESENT ILLNESS:  This patient is a  1, para 1-0-0-1, patient who has been followed by our midwife service.  She had a spontaneous vaginal delivery on 2018.  She had fetal tachycardia during her labor and meconium present at delivery.  Placenta was sent for pathology and showed evidence of chorioamnionitis.  She did not have a fever while she was in labor.  Her beta strep testing during pregnancy was negative.  Postpartum patient was discharged home on the second postpartum day.  She did not have complications while she was in the hospital and she was sent home.  On the  she returned to clinic because she felt that her lochia had a foul smell and she was seen in clinic.  At that time, she had a Gram stain performed that showed gram-positive cocci but she was afebrile and her uterus was nontender according to the midwife's note from that date.  The patient was sent home again and then on the  she was seen in urgent care.  At that time, she noted a history of having developed fever and dysuria.  They did a urine culture that was negative.  Her white count was slightly elevated at 11,000 at that time and the diagnosis of possible mild endometritis was made and she was started on oral Augmentin and sent home.  Today, the patient called saying her abdominal pain had become severe and she was sent to the emergency room.  On arrival here she is still febrile.  Her white count has come down a little bit from a couple of days ago, it is at 9.2 now and is in the technically normal range.  She had an ultrasound performed 2 days ago when she was in urgent care that showed no sign of retained products and there was a little bit of calcification seen, but basically normal findings.  Today, she was sent for a CT scan from the emergency room and it showed an enhanced area on the posterior wall consistent with possible endometritis.  They said they could not rule out  retained products of conception, but given the ultrasound showed none 2 days ago, that is a better image for that situation and I do not believe that we are dealing with retained products of conception.  Her lactic acid was normal today and she is afebrile on admission, but she definitely has significant uterine tenderness and there is free fluid as well in the pelvis which would also be consistent with an endometritis.  Therefore, since she seems to have failed outpatient treatment, we are admitting her for suspected endometritis and starting her on IV antibiotics.  She has received some pain medication as well.  She is breastfeeding.  There is no sign of any other localizing infection.  No evidence of mastitis.     PMH: chorioamnionitis with recent delivery, 14 hr labor, vaginal delivery  ROS: 12 system negative except uterine pain and foul smelling lochia     PHYSICAL EXAM:   VITAL SIGNS:  Her vitals are stable, she is afebrile.  Breasts: non tender    Abd:  Her abdomen is very tender over the uterine fundus, but there is no rebound.  She has no CVA tenderness.   EXTREMITIES:  No tender areas or phlebitis     ASSESSMENT:  Possible endometritis and admission for IV antibiotics and pain control.   She had a gram stain showing gram + cocci in office.    Plan: since her symptoms are getting worse on outpatient antibiotics, we are admitting for iv antibiotics           Electric breast pump           Oral pain meds           Daily CBC        JOHNATHAN MAX MD             D: 2018   T: 2018   MT: MICHELLE      Name:     DELIO BARKLEY   MRN:      [id number]        Account:      [id number]   :      1990        Admitted:     2018                   Document: [id number]

## 2018-02-26 NOTE — ED PROVIDER NOTES
History     Chief Complaint:  Abdominal pain     HPI:   The history is provided by the patient.      Haritha Bateman is a 27 year old female who presents to the emergency department with her  for evaluation of abdominal pain. Of note, the patient had a vaginal birth on 02/20/18 with no complications, but was found to have an infected placenta. She says that on Friday (3 days ago) she began to have a fowl smelling yellow vaginal discharge and bleeding but with no fever. She called her midwife who recommended evaluation so presented to [facility] [place]. Later that day she developed subjective fevers and so presented to the women's [place] the following morning. She was thought to have endometriosis and possible UTI and was sent home with Augmentin. After discharge her abdominal pain and vaginal bleeding was worsening so she presented to the emergency department with her . She received blood work and imaging as noted below. Today the patient's fowl smelling discharge is somewhat resolved and her bleeding is lighter, but her abdominal pain is worsening prompting their visit to the emergency department. Here in the ED the patient rates her pain a 9/10 in severity and states it is diffuse but worse in her lower abdomen with severe pelvic pain that she describes as cramps. She was suppose to have an appointment with OB GYN today but came here instead as her pain was too severe. She has ibuprofen about an hour ago without relief. She has no other complaints.      Allergies:  No known drug allergies     Medications:    Tylenol   Augmentin   Ibuprofen   Prenatal vit     Past Medical History:    The patient does not have any past pertinent medical history.     Past Surgical History:    History reviewed. No pertinent surgical history.     Family History:    HTN - mother     Social History:  Presents with     Tobacco use: Never smoker   Alcohol use: No   PCP: Physician No Ref-Primary    Marital Status:   "     Review of Systems   Gastrointestinal: Positive for abdominal pain.   Genitourinary: Positive for pelvic pain, vaginal bleeding and vaginal discharge.   All other systems reviewed and are negative.    Physical Exam     Patient Vitals for the past 24 hrs:   BP Temp Temp src Pulse Heart Rate Resp SpO2 Height Weight   02/26/18 1400 (!) 143/95 98.3  F (36.8  C) Oral 75 - 16 - - -   02/26/18 1345 127/89 - - 75 - 14 99 % - -   02/26/18 1330 123/84 - - - - - 97 % - -   02/26/18 1315 - - - 79 - 14 97 % - -   02/26/18 1300 119/83 - - 78 - 14 99 % - -   02/26/18 1230 (!) 129/91 - - - - - 98 % - -   02/26/18 1215 131/89 - - - - - 99 % - -   02/26/18 1200 (!) 132/92 - - - - - 98 % - -   02/26/18 1145 (!) 140/99 - - - - - 97 % - -   02/26/18 1143 (!) 128/92 - - - 79 16 100 % - -   02/26/18 1106 (!) 157/99 98.8  F (37.1  C) Oral - 82 18 100 % 1.651 m (5' 5\") 85.7 kg (189 lb)        Physical Exam  Nursing note and vitals reviewed.  Constitutional:  Appears well-developed and well-nourished. Tearful and appears uncomfortable.   HENT:   Head:    Atraumatic.   Mouth/Throat:   Oropharynx is clear and moist. No oropharyngeal exudate.   Eyes:    Pupils are equal, round, and reactive to light.   Neck:    Normal range of motion. Neck supple.      No tracheal deviation present. No thyromegaly present.   Cardiovascular:  Normal rate, regular rhythm, no murmur   Pulmonary/Chest: Breath sounds are clear and equal without wheezes or crackles.  Abdominal:   Soft. Bowel sounds are normal. Exhibits no distension and      no mass.       There is no rebound. Midline suprapubic abdominal tenderness with guarding.   : External genitalia appears normal. Invasive pelvic exam deferred due to her uterine pain.   Musculoskeletal:  Exhibits no edema.   Lymphadenopathy:  No cervical adenopathy.   Neurological:   Alert and oriented to person, place, and time.   Skin:    Skin is warm and dry. No rash noted. No pallor.      Emergency Department " Course     Imaging:  Radiographic findings were communicated with the patient and family who voiced understanding of the findings.     CT Abd/pelvis with contrast:  IMPRESSION:   1. Enlargement of the uterus is consistent with the postpartum state.  2. Focal area of enhancement along the endometrium on the left posteriorly may be related to endometritis, although retained products of conception cannot be excluded from this appearance. Clinical correlation and follow-up are recommended.     Imaging independently reviewed and agree with radiologist interpretation.      Laboratory:  Blood Culture x2: Pending   CBC: WNL (WBC 9.2, HGB 12.2, )    CMP: Albumin 2.7 (low), ALKPHOS 203 (high), ALT 71 (high), ow WNL (Creatinine 0.58)   Lactic acid: 0.8    Interventions:  1142: Dilaudid 0.5 mg IM  1142: Zofran 4 mg IV   1208: NS 1L IV Bolus    1217: Zosyn 4.5 g IV Infusion   1242: ISOVUE-370 95 ml IV   1257: Dilaudid 0.5 mg IV   1317: Clindamycin 900 mg IV Infusion     Emergency Department Course:  Past medical records, nursing notes, and vitals reviewed.  1143: I performed an exam of the patient and obtained history, as documented above.     IV inserted and blood drawn. This was sent to the lab for further testing, results above.   Above interventions provided.      The patient was sent for a CT while in the emergency department, findings above.     1201: I spoke with Dr. Haque regarding this patient.     1206: I rechecked the patient. Explained findings to patient and .     I personally reviewed the laboratory results with the Patient and spouse and answered all related questions prior to admission.     Findings and plan explained to the Patient and spouse who consents to admission.     1323: Discussed the patient with Dr. Haque, who will admit the patient to a medical bed for further monitoring, evaluation, and treatment.      Impression & Plan      Medical Decision Making:  I found the patient had symptoms  and exam findings with history consistent with acute postpartum endometritis. Therefore I consulted the OB physician on-call, Dr. Haque who request CT be performed to rule out intraabdominal abscess. Her placenta reportedly showed changes consistent with chorioamnionitis, making her at risk for post-partum endometritis.  The CT showed changes consistent with endometritis and could not rule out retained products of conception. She was given Zosyn and Clindamycin after blood cultures were drawn and discussion with Dr. Haque. Admitted to the hospital for further treatment. The plan is that Dr. Haque will see her just prior to going off the floor. The patient was not appearing septic or toxic here and her pain was well controlled with Dilaudid.     Diagnosis:    ICD-10-CM    1. Postpartum endometritis O86.12 CBC with platelets + differential     Comprehensive metabolic panel     Blood culture     Blood culture       Disposition:  Admitted to Dr. Haque for further evaluation.    Scribe Disclosure:   IRoderick, am serving as a scribe at 11:43 AM on 2/26/2018 to document services personally performed by Radha Mcwilliams MD based on my observations and the provider's statements to me.       Roderick An  2/26/2018    EMERGENCY DEPARTMENT       Radha Mcwilliams MD  02/26/18 1913

## 2018-02-27 LAB
BASOPHILS # BLD AUTO: 0.1 10E9/L (ref 0–0.2)
BASOPHILS NFR BLD AUTO: 1 %
DIFFERENTIAL METHOD BLD: ABNORMAL
EOSINOPHIL # BLD AUTO: 0.4 10E9/L (ref 0–0.7)
EOSINOPHIL NFR BLD AUTO: 6 %
ERYTHROCYTE [DISTWIDTH] IN BLOOD BY AUTOMATED COUNT: 15.6 % (ref 10–15)
HCT VFR BLD AUTO: 31.8 % (ref 35–47)
HGB BLD-MCNC: 10.8 G/DL (ref 11.7–15.7)
LYMPHOCYTES # BLD AUTO: 1.8 10E9/L (ref 0.8–5.3)
LYMPHOCYTES NFR BLD AUTO: 25 %
MCH RBC QN AUTO: 31.3 PG (ref 26.5–33)
MCHC RBC AUTO-ENTMCNC: 34 G/DL (ref 31.5–36.5)
MCV RBC AUTO: 92 FL (ref 78–100)
METAMYELOCYTES # BLD: 0.2 10E9/L
METAMYELOCYTES NFR BLD MANUAL: 3 %
MONOCYTES # BLD AUTO: 0.3 10E9/L (ref 0–1.3)
MONOCYTES NFR BLD AUTO: 4 %
MYELOCYTES # BLD: 0.1 10E9/L
MYELOCYTES NFR BLD MANUAL: 1 %
NEUTROPHILS # BLD AUTO: 4.4 10E9/L (ref 1.6–8.3)
NEUTROPHILS NFR BLD AUTO: 60 %
OVALOCYTES BLD QL SMEAR: SLIGHT
PLATELET # BLD AUTO: 230 10E9/L (ref 150–450)
PLATELET # BLD EST: ABNORMAL 10*3/UL
RBC # BLD AUTO: 3.45 10E12/L (ref 3.8–5.2)
WBC # BLD AUTO: 7.3 10E9/L (ref 4–11)

## 2018-02-27 PROCEDURE — 99231 SBSQ HOSP IP/OBS SF/LOW 25: CPT | Performed by: OBSTETRICS & GYNECOLOGY

## 2018-02-27 PROCEDURE — 12000037 ZZH R&B POSTPARTUM INTERMEDIATE

## 2018-02-27 PROCEDURE — 85025 COMPLETE CBC W/AUTO DIFF WBC: CPT | Performed by: OBSTETRICS & GYNECOLOGY

## 2018-02-27 PROCEDURE — 25000125 ZZHC RX 250: Performed by: OBSTETRICS & GYNECOLOGY

## 2018-02-27 PROCEDURE — 25000132 ZZH RX MED GY IP 250 OP 250 PS 637: Performed by: OBSTETRICS & GYNECOLOGY

## 2018-02-27 PROCEDURE — 25000128 H RX IP 250 OP 636: Performed by: OBSTETRICS & GYNECOLOGY

## 2018-02-27 PROCEDURE — 36415 COLL VENOUS BLD VENIPUNCTURE: CPT | Performed by: OBSTETRICS & GYNECOLOGY

## 2018-02-27 RX ORDER — FLUCONAZOLE 150 MG/1
150 TABLET ORAL DAILY
Status: COMPLETED | OUTPATIENT
Start: 2018-02-27 | End: 2018-02-27

## 2018-02-27 RX ADMIN — TAZOBACTAM SODIUM AND PIPERACILLIN SODIUM 3.38 G: 375; 3 INJECTION, SOLUTION INTRAVENOUS at 00:17

## 2018-02-27 RX ADMIN — OXYCODONE HYDROCHLORIDE 5 MG: 5 TABLET ORAL at 15:54

## 2018-02-27 RX ADMIN — ACETAMINOPHEN 975 MG: 325 TABLET, FILM COATED ORAL at 15:54

## 2018-02-27 RX ADMIN — CLINDAMYCIN PHOSPHATE 900 MG: 18 INJECTION, SOLUTION INTRAVENOUS at 12:48

## 2018-02-27 RX ADMIN — IBUPROFEN 800 MG: 400 TABLET ORAL at 04:53

## 2018-02-27 RX ADMIN — ACETAMINOPHEN 975 MG: 325 TABLET, FILM COATED ORAL at 07:59

## 2018-02-27 RX ADMIN — SENNOSIDES AND DOCUSATE SODIUM 1 TABLET: 8.6; 5 TABLET ORAL at 07:59

## 2018-02-27 RX ADMIN — FLUCONAZOLE 150 MG: 150 TABLET ORAL at 19:00

## 2018-02-27 RX ADMIN — OXYCODONE HYDROCHLORIDE 5 MG: 5 TABLET ORAL at 03:29

## 2018-02-27 RX ADMIN — CLINDAMYCIN PHOSPHATE 900 MG: 18 INJECTION, SOLUTION INTRAVENOUS at 21:17

## 2018-02-27 RX ADMIN — ACETAMINOPHEN 975 MG: 325 TABLET, FILM COATED ORAL at 00:17

## 2018-02-27 RX ADMIN — IBUPROFEN 800 MG: 400 TABLET ORAL at 11:12

## 2018-02-27 RX ADMIN — TAZOBACTAM SODIUM AND PIPERACILLIN SODIUM 3.38 G: 375; 3 INJECTION, SOLUTION INTRAVENOUS at 19:42

## 2018-02-27 RX ADMIN — TAZOBACTAM SODIUM AND PIPERACILLIN SODIUM 3.38 G: 375; 3 INJECTION, SOLUTION INTRAVENOUS at 07:28

## 2018-02-27 RX ADMIN — CLINDAMYCIN PHOSPHATE 900 MG: 18 INJECTION, SOLUTION INTRAVENOUS at 04:54

## 2018-02-27 RX ADMIN — OXYCODONE HYDROCHLORIDE 5 MG: 5 TABLET ORAL at 07:59

## 2018-02-27 RX ADMIN — IBUPROFEN 800 MG: 400 TABLET ORAL at 17:16

## 2018-02-27 RX ADMIN — OXYCODONE HYDROCHLORIDE 5 MG: 5 TABLET ORAL at 19:00

## 2018-02-27 RX ADMIN — TAZOBACTAM SODIUM AND PIPERACILLIN SODIUM 3.38 G: 375; 3 INJECTION, SOLUTION INTRAVENOUS at 13:54

## 2018-02-27 RX ADMIN — OXYCODONE HYDROCHLORIDE 5 MG: 5 TABLET ORAL at 22:30

## 2018-02-27 RX ADMIN — OXYCODONE HYDROCHLORIDE 5 MG: 5 TABLET ORAL at 11:11

## 2018-02-27 NOTE — PLAN OF CARE
Problem: Infection, Risk/Actual (Adult)  Goal: Identify Related Risk Factors and Signs and Symptoms  Related risk factors and signs and symptoms are identified upon initiation of Human Response Clinical Practice Guideline (CPG).   Outcome: No Change  VSS. Clindamycin and Zosyn given as ordered. Oxycodone, tylenol and ibuprofen for pain control. Ambulating with SBA  in room. Breast feeding infant.  in room with baby and supportive.

## 2018-02-27 NOTE — PLAN OF CARE
Vital signs are stable.  Tylenol, oxycodone for discomfort.  On double antibiotics.  Breastfeeding well.   is here with baby.  Will continue to monitor.

## 2018-02-27 NOTE — PROVIDER NOTIFICATION
02/27/18 1610   Provider Notification   Provider Name/Title Dr. Damon   Method of Notification Phone   Request Evaluate-Remote   Notification Reason Status Update     Phone call to Dr. Damon.  Patient c/o internal vaginal itching.  TORB for single dose of Diflucan.  Single 150 mg tab of Diflucan ordered for this afternoon.

## 2018-02-27 NOTE — PLAN OF CARE
Problem: Infection, Risk/Actual (Adult)  Goal: Identify Related Risk Factors and Signs and Symptoms  Related risk factors and signs and symptoms are identified upon initiation of Human Response Clinical Practice Guideline (CPG).   Outcome: No Change  Afebrile,voiding with out difficulty,patient states abdominal cramping worse with breast feeding, aqua k pad given with some relief&taking tylenol,ibuprofen&oxycodone,continue I/v antibiotics for now.Will continue to monitor.

## 2018-02-27 NOTE — PROGRESS NOTES
"S: Pt doing well. Infant is being . Pain is controlled with current analgesics.  Medication(s) being used: acetaminophen, ibuprofen (OTC) and narcotic analgesics including oxycodone. Patient reports that her abdominal pain is mostly cramping and worse with breastfeeding. Pain medications are working well for her and not needing anything IV. Hasn't had a BM since hospitalization but was going prior to admission. Tolerating a regular diet. Denies any chills, fever, body aches    O: /83  Pulse 80  Temp 97.8  F (36.6  C) (Oral)  Resp 16  Ht 1.651 m (5' 5\")  Wt 85.7 kg (189 lb)  LMP 05/04/2017  SpO2 99%  BMI 31.45 kg/m2        ABD:  Uterine fundus is firm, non-tender and at the level of the umbilicus                Hemoglobin   Date Value Ref Range Status   02/26/2018 12.2 11.7 - 15.7 g/dL Final            Lab Results   Component Value Date    RH Pos 02/20/2018       A:  HD #2 s/p admission for possible endometritis but atypical picture since no fever, no elevated WBC or tachy. Patient did have chorio on placental path at time of delivery. Readmitted yesterday for pain.      P: Patient is doing well now on oral pain meds and abx  Will continue IV abx until tomorrow AM and then if afebrile will d/c with oral augmentin and rx for tylenol, ibuprofen, oxycodone    "

## 2018-02-27 NOTE — ADDENDUM NOTE
Addendum  created 02/27/18 1700 by Minh Parker MD    Anesthesia Staff edited, Procedure Event Log accessed

## 2018-02-28 LAB
ALBUMIN SERPL-MCNC: 2.7 G/DL (ref 3.4–5)
ALP SERPL-CCNC: 158 U/L (ref 40–150)
ALT SERPL W P-5'-P-CCNC: 40 U/L (ref 0–50)
ANION GAP SERPL CALCULATED.3IONS-SCNC: 7 MMOL/L (ref 3–14)
AST SERPL W P-5'-P-CCNC: 20 U/L (ref 0–45)
BILIRUB SERPL-MCNC: 0.1 MG/DL (ref 0.2–1.3)
BUN SERPL-MCNC: 11 MG/DL (ref 7–30)
C DIFF TOX B STL QL: NEGATIVE
CALCIUM SERPL-MCNC: 8.8 MG/DL (ref 8.5–10.1)
CHLORIDE SERPL-SCNC: 106 MMOL/L (ref 94–109)
CO2 SERPL-SCNC: 25 MMOL/L (ref 20–32)
CREAT SERPL-MCNC: 0.62 MG/DL (ref 0.52–1.04)
GFR SERPL CREATININE-BSD FRML MDRD: >90 ML/MIN/1.7M2
GLUCOSE SERPL-MCNC: 71 MG/DL (ref 70–99)
POTASSIUM SERPL-SCNC: 4 MMOL/L (ref 3.4–5.3)
PROT SERPL-MCNC: 6.9 G/DL (ref 6.8–8.8)
SODIUM SERPL-SCNC: 138 MMOL/L (ref 133–144)
SPECIMEN SOURCE: NORMAL

## 2018-02-28 PROCEDURE — 25000125 ZZHC RX 250: Performed by: OBSTETRICS & GYNECOLOGY

## 2018-02-28 PROCEDURE — 25000132 ZZH RX MED GY IP 250 OP 250 PS 637: Performed by: OBSTETRICS & GYNECOLOGY

## 2018-02-28 PROCEDURE — 36415 COLL VENOUS BLD VENIPUNCTURE: CPT | Performed by: OBSTETRICS & GYNECOLOGY

## 2018-02-28 PROCEDURE — 80053 COMPREHEN METABOLIC PANEL: CPT | Performed by: OBSTETRICS & GYNECOLOGY

## 2018-02-28 PROCEDURE — 25000128 H RX IP 250 OP 636: Performed by: OBSTETRICS & GYNECOLOGY

## 2018-02-28 PROCEDURE — 87493 C DIFF AMPLIFIED PROBE: CPT | Performed by: OBSTETRICS & GYNECOLOGY

## 2018-02-28 PROCEDURE — 12000037 ZZH R&B POSTPARTUM INTERMEDIATE

## 2018-02-28 RX ORDER — AMOXICILLIN AND CLAVULANATE POTASSIUM 500; 125 MG/1; MG/1
1 TABLET, FILM COATED ORAL EVERY 8 HOURS SCHEDULED
Status: DISCONTINUED | OUTPATIENT
Start: 2018-02-28 | End: 2018-03-01 | Stop reason: HOSPADM

## 2018-02-28 RX ORDER — LOPERAMIDE HCL 2 MG
4 CAPSULE ORAL ONCE
Status: COMPLETED | OUTPATIENT
Start: 2018-02-28 | End: 2018-02-28

## 2018-02-28 RX ORDER — LOPERAMIDE HCL 2 MG
2 CAPSULE ORAL 3 TIMES DAILY PRN
Status: DISCONTINUED | OUTPATIENT
Start: 2018-02-28 | End: 2018-03-01 | Stop reason: HOSPADM

## 2018-02-28 RX ADMIN — OXYCODONE HYDROCHLORIDE 5 MG: 5 TABLET ORAL at 02:37

## 2018-02-28 RX ADMIN — OXYCODONE HYDROCHLORIDE 5 MG: 5 TABLET ORAL at 10:15

## 2018-02-28 RX ADMIN — IBUPROFEN 800 MG: 400 TABLET ORAL at 13:35

## 2018-02-28 RX ADMIN — CLINDAMYCIN PHOSPHATE 900 MG: 18 INJECTION, SOLUTION INTRAVENOUS at 05:32

## 2018-02-28 RX ADMIN — ACETAMINOPHEN 975 MG: 325 TABLET, FILM COATED ORAL at 15:39

## 2018-02-28 RX ADMIN — IBUPROFEN 800 MG: 400 TABLET ORAL at 19:20

## 2018-02-28 RX ADMIN — OXYCODONE HYDROCHLORIDE 5 MG: 5 TABLET ORAL at 05:29

## 2018-02-28 RX ADMIN — ACETAMINOPHEN 975 MG: 325 TABLET, FILM COATED ORAL at 07:54

## 2018-02-28 RX ADMIN — TAZOBACTAM SODIUM AND PIPERACILLIN SODIUM 3.38 G: 375; 3 INJECTION, SOLUTION INTRAVENOUS at 02:17

## 2018-02-28 RX ADMIN — ACETAMINOPHEN 975 MG: 325 TABLET, FILM COATED ORAL at 00:26

## 2018-02-28 RX ADMIN — IBUPROFEN 800 MG: 400 TABLET ORAL at 06:17

## 2018-02-28 RX ADMIN — AMOXICILLIN AND CLAVULANATE POTASSIUM 1 TABLET: 500; 125 TABLET, FILM COATED ORAL at 13:35

## 2018-02-28 RX ADMIN — IBUPROFEN 800 MG: 400 TABLET ORAL at 00:27

## 2018-02-28 RX ADMIN — AMOXICILLIN AND CLAVULANATE POTASSIUM 1 TABLET: 500; 125 TABLET, FILM COATED ORAL at 22:10

## 2018-02-28 RX ADMIN — TAZOBACTAM SODIUM AND PIPERACILLIN SODIUM 3.38 G: 375; 3 INJECTION, SOLUTION INTRAVENOUS at 07:54

## 2018-02-28 RX ADMIN — LOPERAMIDE HYDROCHLORIDE 4 MG: 2 CAPSULE ORAL at 19:51

## 2018-02-28 NOTE — PLAN OF CARE
Problem: Infection, Risk/Actual (Adult)  Goal: Identify Related Risk Factors and Signs and Symptoms  Related risk factors and signs and symptoms are identified upon initiation of Human Response Clinical Practice Guideline (CPG).   Outcome: Improving  Patient meeting expected goals for this shift.  Using ibuprofen, tylenol and oxycodone for pain/comfort.  Independent in all self cares.  Patient c/o loose stools; stool softener held.   present at bedside and supportive.  Continue to monitor and notify MD as needed.

## 2018-02-28 NOTE — PLAN OF CARE
Problem: Infection, Risk/Actual (Adult)  Goal: Identify Related Risk Factors and Signs and Symptoms  Related risk factors and signs and symptoms are identified upon initiation of Human Response Clinical Practice Guideline (CPG).   Outcome: No Change  Pain controlled with tylenol,motrin and oxycodone up ambulating voiding ok had one loose stool this shift sample send lab for C - diff negative  On antibiotic IV   Baby rooming in dad supportive encouraged to call with needs continue to monitor

## 2018-02-28 NOTE — PLAN OF CARE
Problem: Patient Care Overview  Goal: Plan of Care/Patient Progress Review  Outcome: No Change  /87 this morning, aware,afebrile,had 3 loose stools,patient states last one was little softer,i/v antibiotics stopped & started on oral augmentin,pain control with tylenol,ibuprofen&oxycodone,voiding well.Will continue to monitor.

## 2018-02-28 NOTE — PROGRESS NOTES
Doing well. Afebrile. Started with loose stools yesterday. C-diff neg. One stool this AM.  Pain improving.   Nursing well.  A: Resolving possible endometritis  Diarrhea with neg c-diff  P: D/C IV antibiotics.  Start augmentin PO  DC tomorrow. Need one day to be sure diarrhea doesn't worsen and need change in plan.

## 2018-02-28 NOTE — LACTATION NOTE
Met with pt.  Infant feeding well at time of visit.  Haritha has no concerns.  Will continue to follow as needed.  Romi Kraus  RN, IBCLC

## 2018-02-28 NOTE — PROGRESS NOTES
AMY Courtesy Round     S/O: Haritha states that her pain is much improved, states that she is only having mild cramping.  Haritha states that she has been having diarrhea since yesterday; her IV abx were changed to PO abx today by MDs.  VSS, afebrile    A:   Endometritis  Diarrhea    P:  All care/orders per MD service  Offered patient f/u appointment in 1 week from [place]    Angle VARGHESE CNM

## 2018-02-28 NOTE — PROVIDER NOTIFICATION
02/27/18 2150   Provider Notification   Provider Name/Title Dr. Damon   Method of Notification Phone   Request Evaluate-Remote   Notification Reason Status Update     Phone call to Dr. Damon to report that patient has had 3 episodes of loose, watery stools with urgency.  TORB to collect stool sample for c.diff testing.  No further orders at this time.

## 2018-03-01 VITALS
SYSTOLIC BLOOD PRESSURE: 140 MMHG | OXYGEN SATURATION: 99 % | DIASTOLIC BLOOD PRESSURE: 88 MMHG | HEART RATE: 79 BPM | HEIGHT: 65 IN | BODY MASS INDEX: 31.49 KG/M2 | TEMPERATURE: 97.9 F | WEIGHT: 189 LBS | RESPIRATION RATE: 16 BRPM

## 2018-03-01 PROCEDURE — 99238 HOSP IP/OBS DSCHRG MGMT 30/<: CPT | Performed by: OBSTETRICS & GYNECOLOGY

## 2018-03-01 PROCEDURE — 25000132 ZZH RX MED GY IP 250 OP 250 PS 637: Performed by: OBSTETRICS & GYNECOLOGY

## 2018-03-01 RX ORDER — LOPERAMIDE HCL 2 MG
2 CAPSULE ORAL 3 TIMES DAILY PRN
Qty: 20 CAPSULE | Refills: 0 | Status: SHIPPED | OUTPATIENT
Start: 2018-03-01 | End: 2018-04-06

## 2018-03-01 RX ORDER — OXYCODONE HYDROCHLORIDE 5 MG/1
5 TABLET ORAL EVERY 8 HOURS PRN
Qty: 18 TABLET | Refills: 0 | Status: SHIPPED | OUTPATIENT
Start: 2018-03-01 | End: 2018-04-06

## 2018-03-01 RX ORDER — AMOXICILLIN AND CLAVULANATE POTASSIUM 500; 125 MG/1; MG/1
1 TABLET, FILM COATED ORAL EVERY 8 HOURS
Qty: 15 TABLET | Refills: 0 | Status: SHIPPED | OUTPATIENT
Start: 2018-03-01 | End: 2018-04-06

## 2018-03-01 RX ADMIN — IBUPROFEN 800 MG: 400 TABLET ORAL at 01:37

## 2018-03-01 RX ADMIN — IBUPROFEN 800 MG: 400 TABLET ORAL at 07:44

## 2018-03-01 RX ADMIN — AMOXICILLIN AND CLAVULANATE POTASSIUM 1 TABLET: 500; 125 TABLET, FILM COATED ORAL at 06:11

## 2018-03-01 RX ADMIN — ACETAMINOPHEN 975 MG: 325 TABLET, FILM COATED ORAL at 07:45

## 2018-03-01 RX ADMIN — OXYCODONE HYDROCHLORIDE 5 MG: 5 TABLET ORAL at 06:11

## 2018-03-01 RX ADMIN — LOPERAMIDE HYDROCHLORIDE 2 MG: 2 CAPSULE ORAL at 01:42

## 2018-03-01 RX ADMIN — ACETAMINOPHEN 975 MG: 325 TABLET, FILM COATED ORAL at 00:23

## 2018-03-01 NOTE — PLAN OF CARE
Problem: Patient Care Overview  Goal: Plan of Care/Patient Progress Review  Outcome: Improving  Vitals within defined limits. Fundus firm. Lochia scant. Up ad maria luisa. Remains on scheduled PO antibiotics. Taking PRN Tylenol/Motrin for abdominal cramps, using Aqua kpad intermittently. Patient reports having loose stool x1, PRN Imodium given. Infant rooming in. Will continue to monitor.

## 2018-03-01 NOTE — PLAN OF CARE
"Problem: Patient Care Overview  Goal: Plan of Care/Patient Progress Review  Outcome: No Change  Patient reported she had diarrhea x4 from 3-6 pm this evening. Patient states \"every time I eat anything I feel like it goes right through me.\"  Notified Dr. Ledesma, imodium PO ordered. Patient rates abdominal discomfort 4-5/10. Using aqua-k pad for comfort.  Taking ibuprofen and tylenol PO with adequate relief. Declines need for oxycodone at this time, will call RN if needed.  Plan to give imodium when available from pharmacy. Will continue to monitor.       "

## 2018-03-01 NOTE — PROGRESS NOTES
"S: Pt doing well.  Pain is much improved    O: /88  Pulse 79  Temp 97.9  F (36.6  C) (Oral)  Resp 16  Ht 1.651 m (5' 5\")  Wt 85.7 kg (189 lb)  LMP 05/04/2017  SpO2 99%  BMI 31.45 kg/m2        ABD:  Uterine fundus is firm, non-tender                Hemoglobin   Date Value Ref Range Status   02/27/2018 10.8 (L) 11.7 - 15.7 g/dL Final            Lab Results   Component Value Date    RH Pos 02/20/2018       A:  Day of discharge after iv antibiotics for endometritis postpartum,    P: discharge on augmentin    "

## 2018-03-01 NOTE — PLAN OF CARE
"Patient complains of vision changes \"seeing stars.\"  Patient also stated had mild headache earlier today which resolved. 's/90's. Notified Dr. Ledesma. CMP ordered and results were within normal limits.  Patient stated she has had no further diarrhea within the last few hours since dose of imodium given.  Will continue to monitor.   "

## 2018-03-01 NOTE — PLAN OF CARE
Problem: Patient Care Overview  Goal: Plan of Care/Patient Progress Review  Outcome: Adequate for Discharge Date Met: 03/01/18  /88 this morning, aware,afebrile,voiding well,pain control with tylenol,ibuprofen&ocasional oxycodone,on oral augmentin, had one soft stool this morning,states feeling better today.Plan to discharge today.

## 2018-03-01 NOTE — DISCHARGE INSTRUCTIONS
Endometriosis  - Call your doctor right away if you have any of the following:  - A fever above 100.4 F (38 C), with or without chills.  - Abdominal pain and swelling that get worse.  - Pain that is not relieved by your medication.  - You have vaginal discharge that smells bad or any unusual bleeding.  - Dizziness or fainting.  - Nausea and vomiting.  - You have any questions of concerns ounce you return home.

## 2018-03-01 NOTE — PLAN OF CARE
D: VSS, assessments WDL.   I: Pt. received complete discharge paperwork and home medications as filled by discharge pharmacy.  Pt. was given times of last dose for all discharge medications in writing on discharge medication sheets.  Discharge teaching included home medication, pain management, activity restrictions, postpartum cares, and signs and symptoms of infection.    A: Discharge outcomes on care plan met. Patient states understanding and comfort with self cares.  P: Pt. discharged to home.   Pt. to follow up with OB per MD order or sooner if any concerns.  Pt. had no further questions at the time of discharge and no unmet needs were identified.

## 2018-03-02 LAB
BACTERIA SPEC CULT: NO GROWTH
Lab: NORMAL
SPECIMEN SOURCE: NORMAL

## 2018-03-03 LAB
BACTERIA SPEC CULT: NO GROWTH
Lab: NORMAL
SPECIMEN SOURCE: NORMAL

## 2018-03-04 LAB
BACTERIA SPEC CULT: NO GROWTH
BACTERIA SPEC CULT: NO GROWTH
Lab: NORMAL
Lab: NORMAL
SPECIMEN SOURCE: NORMAL
SPECIMEN SOURCE: NORMAL

## 2018-03-05 NOTE — DISCHARGE SUMMARY
Admit Date:     2018   Discharge Date:     2018      This patient was a 27-year-old female,  1, para 1,0,0,1.  She had prenatal care with our midwife service and had a spontaneous vaginal delivery on .  Her placenta subsequently from pathology showed mild infection in the form of funisitis.  She was discharged home and then returned to clinic due to some suprapubic pain.  She was not having fevers, and she was sent home.  She was then seen by family practice, and they diagnosed mild endometritis and placed her on outpatient oral antibiotics of Augmentin.  She then presented to the emergency room with significant increase in pain localized to her uterus.  She was not taken her temperatures at home but noted a history of intermittent shaking chills at home.  When I say her in the emergency room, she had very significant pain over her uterus.  No other sources were noted.  Her white count was 11,000 and she was afebrile.  Since she appeared to have progression of endometritis symptoms and has failed outpatient antibiotic, she was admitted to the hospital for inpatient IV antibiotics.  She was treated with Zofran and clindamycin.  She improved in the hospital, and her white count began to drop.  She did not have fevers while she was in the hospital.  Her white count dropped significantly while she was treated.  She was discharged home on 2018 to continue outpatient Augmentin and then return to clinic for followup the following week.         JOHNATHAN MAX MD             D: 2018   T: 2018   MT: [id number]      Name:     DELIO BARKLEY   MRN:      [id number]        Account:        [id number]   :      1990           Admit Date:     2018                                  Discharge Date: 2018      Document: [id number]

## 2018-03-07 ENCOUNTER — OFFICE VISIT (OUTPATIENT)
Dept: MIDWIFE SERVICES | Facility: CLINIC | Age: 28
End: 2018-03-07
Payer: COMMERCIAL

## 2018-03-07 VITALS
SYSTOLIC BLOOD PRESSURE: 118 MMHG | TEMPERATURE: 97.9 F | WEIGHT: 170 LBS | BODY MASS INDEX: 28.29 KG/M2 | DIASTOLIC BLOOD PRESSURE: 74 MMHG

## 2018-03-07 DIAGNOSIS — N89.8 ITCHING OF VAGINA: ICD-10-CM

## 2018-03-07 DIAGNOSIS — N71.9 ENDOMETRITIS: Primary | ICD-10-CM

## 2018-03-07 DIAGNOSIS — B37.31 YEAST VAGINITIS: ICD-10-CM

## 2018-03-07 PROBLEM — O99.013 ANEMIA COMPLICATING PREGNANCY IN THIRD TRIMESTER: Status: RESOLVED | Noted: 2017-11-16 | Resolved: 2018-03-07

## 2018-03-07 PROBLEM — O26.03: Status: RESOLVED | Noted: 2018-02-20 | Resolved: 2018-03-07

## 2018-03-07 LAB
SPECIMEN SOURCE: NORMAL
WET PREP SPEC: NORMAL

## 2018-03-07 PROCEDURE — 87210 SMEAR WET MOUNT SALINE/INK: CPT | Performed by: ADVANCED PRACTICE MIDWIFE

## 2018-03-07 PROCEDURE — 99214 OFFICE O/P EST MOD 30 MIN: CPT | Performed by: ADVANCED PRACTICE MIDWIFE

## 2018-03-07 RX ORDER — FLUCONAZOLE 150 MG/1
150 TABLET ORAL
Qty: 3 TABLET | Refills: 0 | Status: SHIPPED | OUTPATIENT
Start: 2018-03-07 | End: 2018-04-06

## 2018-03-07 NOTE — MR AVS SNAPSHOT
After Visit Summary   3/7/2018    Haritha Bateman    MRN: [id number]           Patient Information     Date Of Birth          1990        Visit Information        Provider Department      3/7/2018 4:00 PM Juana Hollis APRN CNM [facility]        Today's Diagnoses     Endometritis    -  1    Itching of vagina        Yeast vaginitis           Follow-ups after your visit        Follow-up notes from your care team     Return in about 4 weeks (around 4/4/2018).      Who to contact     If you have questions or need follow up information about today's clinic visit or your schedule please contact [facility] directly at [phone].  Normal or non-critical lab and imaging results will be communicated to you by [employer]M [employer]art, letter or phone within 4 business days after the clinic has received the results. If you do not hear from us within 7 days, please contact the clinic through HELM [employer]shart or phone. If you have a critical or abnormal lab result, we will notify you by phone as soon as possible.  Submit refill requests through [employer] or call your pharmacy and they will forward the refill request to us. Please allow 3 business days for your refill to be completed.          Additional Information About Your Visit        MyChart Information     [employer] gives you secure access to your electronic health record. If you see a primary care provider, you can also send messages to your care team and make appointments. If you have questions, please call your primary care clinic.  If you do not have a primary care provider, please call [phone] and they will assist you.        Care EveryWhere ID     This is your Care EveryWhere ID. This could be used by other organizations to access your [facility] medical records  [id number]        Your Vitals Were     Temperature Last Period BMI (Body Mass Index)             97.9  F (36.6  C) (Oral) 05/04/2017 28.29 kg/m2          Blood  Pressure from Last 3 Encounters:   03/07/18 118/74   03/01/18 140/88   02/24/18 126/88    Weight from Last 3 Encounters:   03/07/18 170 lb (77.1 kg)   02/26/18 189 lb (85.7 kg)   02/24/18 189 lb 9.6 oz (86 kg)              We Performed the Following     Wet prep          Today's Medication Changes          These changes are accurate as of 3/7/18  4:53 PM.  If you have any questions, ask your nurse or doctor.               Start taking these medicines.        Dose/Directions    fluconazole 150 MG tablet   Commonly known as:  DIFLUCAN   Used for:  Yeast vaginitis   Started by:  Juana Hollis APRN CNM        Dose:  150 mg   Take 1 tablet (150 mg) by mouth every 72 hours as needed   Quantity:  3 tablet   Refills:  0            Where to get your medicines      These medications were sent to [facility] [address] [place] [address] - [address] AT [facility] [address]  [address], [facility] [address]     Phone:  [phone]     fluconazole 150 MG tablet                Primary Care Provider Fax #    Physician No Ref-Primary [phone]       No address on file        Equal Access to Services     ADEOLA IBRAHIM AH: Nieves tenorioo Soleoncio, waaxda luqadaha, qaybta kaalmada adeegyada, cayetano snyder. So [facility] [phone].    ATENCIÓN: Si habla español, tiene a mishra disposición servicios gratuitos de asistencia lingüística. Llame al [phone].    We comply with applicable federal civil rights laws and [place] laws. We do not discriminate on the basis of race, color, national origin, age, disability, sex, sexual orientation, or gender identity.            Thank you!     Thank you for choosing [facility]  for your care. Our goal is always to provide you with excellent care. Hearing back from our patients is one way we can continue to improve our services. Please take a few minutes to complete the written survey that you may  receive in the mail after your visit with us. Thank you!             Your Updated Medication List - Protect others around you: Learn how to safely use, store and throw away your medicines at www.disposemymeds.org.          This list is accurate as of 3/7/18  4:53 PM.  Always use your most recent med list.                   Brand Name Dispense Instructions for use Diagnosis    * amoxicillin-clavulanate 875-125 MG per tablet    AUGMENTIN    28 tablet    Take 1 tablet by mouth 2 times daily    Dysuria       * amoxicillin-clavulanate 500-125 MG per tablet    AUGMENTIN    15 tablet    Take 1 tablet by mouth every 8 hours    Postpartum endometritis       fluconazole 150 MG tablet    DIFLUCAN    3 tablet    Take 1 tablet (150 mg) by mouth every 72 hours as needed    Yeast vaginitis       ibuprofen 200 MG tablet    ADVIL/MOTRIN    60 tablet    Take 3-4 tablets (600-800 mg) by mouth every 8 hours as needed for pain     (normal spontaneous vaginal delivery)       loperamide 2 MG capsule    IMODIUM    20 capsule    Take 1 capsule (2 mg) by mouth 3 times daily as needed for diarrhea    Postpartum endometritis       oxyCODONE IR 5 MG tablet    ROXICODONE    18 tablet    Take 1 tablet (5 mg) by mouth every 8 hours as needed for other (pain control or improvement in physical function. Hold dose for analgesic side effects.)    Postpartum endometritis       PRENATAL PLUS 27-1 MG Tabs     100 tablet    Take 1 tablet by mouth daily    Supervision of high risk pregnancy in third trimester, 33 weeks gestation of pregnancy       TYLENOL PO      Take 1,000 mg by mouth every 8 hours as needed        * Notice:  This list has 2 medication(s) that are the same as other medications prescribed for you. Read the directions carefully, and ask your doctor or other care provider to review them with you.

## 2018-03-07 NOTE — PROGRESS NOTES
"  SUBJECTIVE:                                                   Haritha Bateman is a 27 year old who presents to clinic today for the following health issue(s):  Patient presents with:  RECHECK: Patient was seen recently in the ER following delivery for complications.      HPI: Haritha is here today with Leoncio for follow-up after discharge from the hospital on 3/1/18.  She was admitted from the ED on  for postpartum abdominal pain and endometritis.  Haritha received IV antibiotics while inpatient.  While in the hospital, she developed diarrhea requiring Immodium.  Haritha improved in the hospital and was discharged 3/1.     Since discharge, Haritha has been feeling much better.  She denies any further abdominal pain, foul-smelling discharge or fevers/chills. Haritha states that she finished her antibiotics yesterday. Haritha has been breastfeeding and formula feeding since delivery. She states that her perineal stitches do not hurt anymore.  Does note some \"pelvic pain\" when she's attempting to use the bathroom but denies constipation.    Haritha also notes vaginal itching since being in the hospital.  She states she was given one Diflucan but in the hospital but that she is still having intense itching.  She attempted two days of a seven day Monistat kit but said that the burning was so intense with application that she discontinued it.     Patient's last menstrual period was 2017.  Menstrual History: recent pregnancy.  Patient is not sexually active  .  Using none for contraception.   Health maintenance updated:  yes  STI infx testing offered:  Declined    Last PHQ-9 score on record =   PHQ-9 SCORE 2017   Total Score 0     Last GAD7 score on record =   BAKARI-7 SCORE 2017   Total Score 0     Alcohol Score = 0    Problem list and histories reviewed & adjusted, as indicated.  Additional history: as documented.    Patient Active Problem List   Diagnosis     Supervision of high risk pregnancy in third " trimester      (normal spontaneous vaginal delivery)     Endometritis     Past Surgical History:   Procedure Laterality Date     NO HISTORY OF SURGERY  2017      Social History   Substance Use Topics     Smoking status: Never Smoker     Smokeless tobacco: Never Used     Alcohol use No      Problem (# of Occurrences) Relation (Name,Age of Onset)    Hypertension (2) Mother, Paternal Grandmother    Other Cancer (1) Paternal Grandmother            Current Outpatient Prescriptions   Medication Sig     fluconazole (DIFLUCAN) 150 MG tablet Take 1 tablet (150 mg) by mouth every 72 hours as needed     oxyCODONE IR (ROXICODONE) 5 MG tablet Take 1 tablet (5 mg) by mouth every 8 hours as needed for other (pain control or improvement in physical function. Hold dose for analgesic side effects.)     loperamide (IMODIUM) 2 MG capsule Take 1 capsule (2 mg) by mouth 3 times daily as needed for diarrhea     amoxicillin-clavulanate (AUGMENTIN) 500-125 MG per tablet Take 1 tablet by mouth every 8 hours     Acetaminophen (TYLENOL PO) Take 1,000 mg by mouth every 8 hours as needed      amoxicillin-clavulanate (AUGMENTIN) 875-125 MG per tablet Take 1 tablet by mouth 2 times daily     ibuprofen (ADVIL/MOTRIN) 200 MG tablet Take 3-4 tablets (600-800 mg) by mouth every 8 hours as needed for pain     Prenatal Vit-Fe Fumarate-FA (PRENATAL PLUS) 27-1 MG TABS Take 1 tablet by mouth daily     No current facility-administered medications for this visit.      No Known Allergies    ROS:  12 point review of systems negative other than symptoms noted below.    OBJECTIVE:     /74  Temp 97.9  F (36.6  C) (Oral)  Wt 170 lb (77.1 kg)  LMP 2017  BMI 28.29 kg/m2  Body mass index is 28.29 kg/(m^2).    PHYSICAL EXAM:  Constitutional:  Appearance: Well nourished, well developed alert, in no acute distress  Chest:  Respiratory Effort:  Breathing unlabored   Abdominal: Physical Exam   Abdominal: Soft. Bowel sounds are normal. There is no  tenderness.   PELVIC EXAM:  Vulva: BUS WNL, no lesions noted  Perineum: Stitches present and intact, normal wound healing. Wet prep swab collected.  Rectal exam: deferred      In-Clinic Test Results:  Results for orders placed or performed in visit on 03/07/18 (from the past 24 hour(s))   Wet prep   Result Value Ref Range    Specimen Description Vagina     Wet Prep No clue cells seen     Wet Prep No yeast seen     Wet Prep No Trichomonas seen        ASSESSMENT/PLAN:                                                        ICD-10-CM    1. Endometritis N71.9    2. Itching of vagina L29.8 Wet prep   3. Yeast vaginitis B37.3 fluconazole (DIFLUCAN) 150 MG tablet       COUNSELING:  -Wet prep negative.  Will treat with Diflucan based on clinical presentation. Rx sent to pharmacy.  -Discussed calling the clinic if any symptoms of endometritis reappear, or if pelvic pain worsens or persists  -Return to clinic in 4 weeks for postpartum exam; planning Nexplanon for contraception.      Juana Hollis, SUZETTE, APRN, CNM    25 minutes was spent face to face with the patient today discussing her history, diagnosis, and follow-up plan as noted above. Over 50% of the visit was spent in counseling and coordination of care.    Total Visit Time: 25 minutes.

## 2018-03-16 ENCOUNTER — TELEPHONE (OUTPATIENT)
Dept: MIDWIFE SERVICES | Facility: CLINIC | Age: 28
End: 2018-03-16

## 2018-03-16 NOTE — TELEPHONE ENCOUNTER
Would like to know if it is safe to do a deep tissue massage or should she wait until her 6 weeks.

## 2018-04-06 ENCOUNTER — PRENATAL OFFICE VISIT (OUTPATIENT)
Dept: MIDWIFE SERVICES | Facility: CLINIC | Age: 28
End: 2018-04-06
Payer: COMMERCIAL

## 2018-04-06 VITALS
BODY MASS INDEX: 28.16 KG/M2 | DIASTOLIC BLOOD PRESSURE: 60 MMHG | HEART RATE: 68 BPM | WEIGHT: 169 LBS | SYSTOLIC BLOOD PRESSURE: 108 MMHG | HEIGHT: 65 IN

## 2018-04-06 DIAGNOSIS — Z97.5 NEXPLANON IN PLACE: ICD-10-CM

## 2018-04-06 DIAGNOSIS — Z30.017 ENCOUNTER FOR INITIAL PRESCRIPTION OF NEXPLANON: ICD-10-CM

## 2018-04-06 PROBLEM — N71.9 ENDOMETRITIS: Status: RESOLVED | Noted: 2018-02-26 | Resolved: 2018-04-06

## 2018-04-06 PROBLEM — O09.93 SUPERVISION OF HIGH RISK PREGNANCY IN THIRD TRIMESTER: Status: RESOLVED | Noted: 2017-09-22 | Resolved: 2018-04-06

## 2018-04-06 LAB — BETA HCG QUAL IFA URINE: NEGATIVE

## 2018-04-06 PROCEDURE — 84703 CHORIONIC GONADOTROPIN ASSAY: CPT | Performed by: ADVANCED PRACTICE MIDWIFE

## 2018-04-06 PROCEDURE — 99207 ZZC POST PARTUM EXAM: CPT | Performed by: ADVANCED PRACTICE MIDWIFE

## 2018-04-06 PROCEDURE — 11981 INSERTION DRUG DLVR IMPLANT: CPT | Performed by: ADVANCED PRACTICE MIDWIFE

## 2018-04-06 ASSESSMENT — ANXIETY QUESTIONNAIRES
1. FEELING NERVOUS, ANXIOUS, OR ON EDGE: NOT AT ALL
6. BECOMING EASILY ANNOYED OR IRRITABLE: NOT AT ALL
3. WORRYING TOO MUCH ABOUT DIFFERENT THINGS: NOT AT ALL
5. BEING SO RESTLESS THAT IT IS HARD TO SIT STILL: NOT AT ALL
7. FEELING AFRAID AS IF SOMETHING AWFUL MIGHT HAPPEN: NOT AT ALL
IF YOU CHECKED OFF ANY PROBLEMS ON THIS QUESTIONNAIRE, HOW DIFFICULT HAVE THESE PROBLEMS MADE IT FOR YOU TO DO YOUR WORK, TAKE CARE OF THINGS AT HOME, OR GET ALONG WITH OTHER PEOPLE: NOT DIFFICULT AT ALL
2. NOT BEING ABLE TO STOP OR CONTROL WORRYING: NOT AT ALL
GAD7 TOTAL SCORE: 0

## 2018-04-06 ASSESSMENT — PATIENT HEALTH QUESTIONNAIRE - PHQ9: 5. POOR APPETITE OR OVEREATING: NOT AT ALL

## 2018-04-06 NOTE — MR AVS SNAPSHOT
After Visit Summary   4/6/2018    Haritha Bateman    MRN: [id number]           Patient Information     Date Of Birth          1990        Visit Information        Provider Department      4/6/2018 10:00 AM Juana Hollis APRN [facility]        Today's Diagnoses     Postpartum examination following vaginal delivery    -  1    Encounter for initial prescription of Nexplanon        Nexplanon in place          Care Instructions    What Nexplanon Users May Expect    Nexplanon is well tolerated and has a low early-removal rate.    Insertion site complications, such as prolonged pain or infection, are rare. Removal is occasionally difficult, and rarely requires a surgical procedure in the operating room.    Menstrual changes are common with Nexplanon. Bleeding may become more or less frequent or heavy, or absent. The bleeding pattern after the first three months is predictive of future bleeding, but the pattern may change at any time. Average bleeding is 18 days over 3 months. Over 50% of women experience rare or absent bleeding over the three year period, while 25% experience frequent or prolonged bleeding.    In clinical studies, users gained 3.7 pounds over two years. It is unknown what portion of this weight gain is related to Nexplanon    Women with a history of depressed mood may have worsening on Nexplanon, and may need to have the device removed.    Ectopic pregnancy is rare while using Nexplanon, but if you develop severe lower abdominal pain, see your healthcare provider.    Return to baseline ovulation patterns is seen 7-14 days after removal of Nexplanon.    Rarely, headaches and acne have also led to device removal.    Nexplanon should be removed if jaundice occurs, see your healthcare provider.    Nexplanon may be less effective in women who weight more than 130% of their ideal body weight.    Nexplanon does not protect against HIV or STDs.    Use a back-up  "method of birth control for the first 7 days after insertion of Nexplanon.    Please call [facility] at [phone] if you have questions or concerns.    For more complete information:  [email or web address]            Follow-ups after your visit        Follow-up notes from your care team     Return in about 1 year (around 4/6/2019).      Who to contact     If you have questions or need follow up information about today's clinic visit or your schedule please contact [facility] directly at [phone].  Normal or non-critical lab and imaging results will be communicated to you by MyChart, letter or phone within 4 business days after the clinic has received the results. If you do not hear from us within 7 days, please contact the clinic through Pombai[employer] or phone. If you have a critical or abnormal lab result, we will notify you by phone as soon as possible.  Submit refill requests through [employer] or call your pharmacy and they will forward the refill request to us. Please allow 3 business days for your refill to be completed.          Additional Information About Your Visit        MyChart Information     [employer] gives you secure access to your electronic health record. If you see a primary care provider, you can also send messages to your care team and make appointments. If you have questions, please call your primary care clinic.  If you do not have a primary care provider, please call [phone] and they will assist you.        Care EveryWhere ID     This is your Care EveryWhere ID. This could be used by other organizations to access your [facility] medical records  [id number]        Your Vitals Were     Pulse Height Last Period BMI (Body Mass Index)          68 5' 5\" (1.651 m) 05/04/2017 28.12 kg/m2         Blood Pressure from Last 3 Encounters:   04/06/18 108/60   03/07/18 118/74   03/01/18 140/88    Weight from Last 3 Encounters: "   04/06/18 169 lb (76.7 kg)   03/07/18 170 lb (77.1 kg)   02/26/18 189 lb (85.7 kg)              We Performed the Following     Beta HCG Qual, Urine - [facility] and [place] ([id number])     ETONOGESTREL IMPLANT SYSTEM     INSERTION NON-BIODEGRADABLE DRUG DELIVERY IMPLANT          Today's Medication Changes          These changes are accurate as of 4/6/18  5:19 PM.  If you have any questions, ask your nurse or doctor.               Start taking these medicines.        Dose/Directions    etonogestrel 68 MG Impl   Commonly known as:  IMPLANON/NEXPLANON   Used for:  Encounter for initial prescription of Nexplanon   Started by:  Juana Hollis APRN CNM        Dose:  1 each   1 each (68 mg) by Subdermal route once for 1 dose   Quantity:  1 each   Refills:  0            Where to get your medicines      Some of these will need a paper prescription and others can be bought over the counter.  Ask your nurse if you have questions.     You don't need a prescription for these medications     etonogestrel 68 MG Impl                Primary Care Provider Fax #    Physician No Ref-Primary [phone]       No address on file        Equal Access to Services     [facility] : Hadandrew Hamilton, waginette smith, qajustin he, cayetano amador . So [facility] [phone].    ATENCIÓN: Si habla español, tiene a mishra disposición servicios gratuitos de asistencia lingüística. Llame al [phone].    We comply with applicable federal civil rights laws and [place] laws. We do not discriminate on the basis of race, color, national origin, age, disability, sex, sexual orientation, or gender identity.            Thank you!     Thank you for choosing [facility]  for your care. Our goal is always to provide you with excellent care. Hearing back from our patients is one way we can continue to improve our services. Please take a few minutes to complete the written survey that  you may receive in the mail after your visit with us. Thank you!             Your Updated Medication List - Protect others around you: Learn how to safely use, store and throw away your medicines at www.disposemymeds.org.          This list is accurate as of 4/6/18  5:19 PM.  Always use your most recent med list.                   Brand Name Dispense Instructions for use Diagnosis    etonogestrel 68 MG Impl    IMPLANON/NEXPLANON    1 each    1 each (68 mg) by Subdermal route once for 1 dose    Encounter for initial prescription of Nexplanon       PRENATAL PLUS 27-1 MG Tabs     100 tablet    Take 1 tablet by mouth daily    Supervision of high risk pregnancy in third trimester, 33 weeks gestation of pregnancy       UNKNOWN TO PATIENT

## 2018-04-06 NOTE — PROGRESS NOTES
Midwife Postpartum 6 Week Visit    Haritha Bateman is a 28 year old here for a postpartum checkup.     Delivery date was 18. She had a  of a viable girl, weight 6 pounds 10.9 oz., with postpartum endometritis requiring hospital admission as a complication.      Since delivery, she has been breast feeding, while mostly using formula due to low supply.  She has not had any signs of infection, her lochia stopped after 1 weeks.  She has not had other complications.  Denies fevers.    She is voiding and having bowel movements without difficulty.  No- c/o constipation, using a stool softener from [employer]. Seems to be helping.  Complains of some mild abdominal pain near her umbilicus.     Contraception was discussed and patient desires nexplanon.   She hasn't had intercourse since delivery.   She complains of moderate pelvic pain.    Mood is Stable  Patient screened for postpartum depression.   Depression Rating was:   Last PHQ-9 score on record =   PHQ-9 SCORE 2018   Total Score 0     Last GAD7 score on record =   BAKARI-7 SCORE 2018   Total Score 0     Alcohol Score = 0    ROS:  12 point review of systems negative other than symptoms noted below.  Gastrointestinal: Constipation and Heartburn  Genitourinary: Pelvic Pain  Endocrine: Excessive Thirst       Current Outpatient Prescriptions:      UNKNOWN TO PATIENT, , Disp: , Rfl:      etonogestrel (IMPLANON/NEXPLANON) 68 MG IMPL, 1 each (68 mg) by Subdermal route once for 1 dose, Disp: 1 each, Rfl: 0     Prenatal Vit-Fe Fumarate-FA (PRENATAL PLUS) 27-1 MG TABS, Take 1 tablet by mouth daily, Disp: 100 tablet, Rfl: 3.   Obstetric History       T1      L1     SAB0   TAB0   Ectopic0   Multiple0   Live Births1       # Outcome Date GA Lbr Delfin/2nd Weight Sex Delivery Anes PTL Lv   1 Term 18 41w5d 12:36 / 01:29 6 lb 10.9 oz (3.03 kg) F Vag-Spont IV REGIONAL,EPI N ELEUTERIO      Name: FILIPPO,BABYLibertad KEBEDE      Apgar1:  1                Apgar5: 7        Last  "pap:    Lab Results   Component Value Date    PAP NILM 2017     Hgb in hospital was 11.0    EXAM:  /60  Pulse 68  Ht 5' 5\" (1.651 m)  Wt 169 lb (76.7 kg)  LMP 2017  BMI 28.12 kg/m2  BMI: Body mass index is 28.12 kg/(m^2).  Constitutional: healthy, alert and no distress  Neck: symmetrical, thyroid normal size, no masses present, no lymphadenopathy present.   Breast: deferred, patient lactating.  Abdomen: soft, non-tender, diastasis 1 FB's    PELVIC EXAM:  Vulva: No lesions, nearly completely healed, no sutures remaining, BUS WNL, mild tenderness  Cervix: smooth, negative CMT  Uterus: Involuted to normal size, non-tender, no masses palpated  Ovaries: No masses palpated  Rectal exam: deferred      INDICATIONS:                                                      Patient presents for placement of Nexplanon for contraception.  She has had been counseled on side effects, risks, benefits and alternatives.  Patient desires to proceed.  Gave patient hard copy of Nexplanon side effects and instructions.    Is a pregnancy test required: Yes.  Was it positive or negative?  Negative  Was a consent obtained?  Yes    Verification of Procedure:  Just before the procedure begans through verbal and active participation of team members, I verified:     Initials   Patient Name LB   Patient  LB   Procedure to be performed LB     Consent:  Risks, benefits of treatment, and alternative options for contraception were discussed.  Patient's questions were elicited and answered.  Written consent was obtained and scanned into medical record.       PROCEDURE:                                                      Patient was resting comfortably on exam table, left arm placed at shoulder level in a 90 degree angle.  Skin was marked 8cm from epicondyl and cleansed with betadine solution.  Insertion site and track infiltrated with 2 cc 1% Lidocaine.      Nexplanon device visualized in applicator by patient and provider.  " Skin punctured with applicator at insertion site and advanced with ease in the subdermal space.  Applicator was removed.  Nexplanon was palpated by provider and patient.    Small amount of bleeding noted at insertion site and no bruising noted along track of Nexplanon.  Bandage and pressure dressing applied to insertion site.         POST PROCEDURE:                                                      To be removed/replaced within three years. Written and verbal instructions provided to patient.  She tolerated the procedure well. There were no complications. Patient was discharged in stable condition.    Keep dressing on and dry for 24 hours, then remove wrap.  Leave steristrips on until they fall off on their own; may use bandaids after that if desired. Keep your user card in a safe place where you'll remember it.  Make note of today's date for removal/replacement of your Nexplanon in 3 years. Call us if there is any redness, tenderness, warmth or drainage from the area of your Nexplanon insertion. Use a backup method of birth control for 7 days.    ASSESSMENT:   Normal postpartum exam after .    ICD-10-CM    1. Postpartum examination following vaginal delivery Z39.2    2. Encounter for initial prescription of Nexplanon Z30.017 Beta HCG Qual, Urine - [facility] ([id number])     etonogestrel (IMPLANON/NEXPLANON) 68 MG IMPL     INSERTION NON-BIODEGRADABLE DRUG DELIVERY IMPLANT     ETONOGESTREL IMPLANT SYSTEM   3. Nexplanon in place Z97.5      Results for orders placed or performed in visit on 18   Beta HCG Qual, Urine - [facility] ([id number])   Result Value Ref Range    Beta HCG Qual IFA Urine Negative NEG^Negative            PLAN:  Return as needed or at time of next expected pap, pelvic, or breast exam.  Teaching: birth control and mental health  Family Planning: Nexplanon inserted  Discussed breastfeeding and methods to increase breastmilk supply.  Offered lactation consultation; Haritha  declined today and will call the clinic if she desires an order for lactation consult.   Encourage Kegels and abdominal exercise.  Continue a multivitamin/prenatal supplement, especially if breastfeeding.  Pap smear was not obtained today.  Nex pap smear due 7/2020.  Postpartum Hgb was not done today.  Continue to monitor abdominal pain/constipation and make an appointment with PCP if persists or worsens.    GDM:  Fasting and 2hr GCT needed:  No    Return to clinic:  In one year for annual exam or sooner if needed    Juana Hollis, SUZETTE, APRN, CNM    50 minutes was spent face to face with the patient today discussing her history, diagnosis, and follow-up plan as noted above. Over 50% of the visit was spent in counseling and coordination of care.    Total Visit Time: 50 minutes.

## 2018-04-07 ASSESSMENT — PATIENT HEALTH QUESTIONNAIRE - PHQ9: SUM OF ALL RESPONSES TO PHQ QUESTIONS 1-9: 0

## 2018-04-07 ASSESSMENT — ANXIETY QUESTIONNAIRES: GAD7 TOTAL SCORE: 0

## 2018-04-17 ENCOUNTER — TELEPHONE (OUTPATIENT)
Dept: NURSING | Facility: CLINIC | Age: 28
End: 2018-04-17

## 2018-04-17 DIAGNOSIS — O92.79 LACTATION SYMPTOM: Primary | ICD-10-CM

## 2018-04-17 NOTE — TELEPHONE ENCOUNTER
PP patient calling in, 18- MARILY  Has questions about lactation, feeling milk supply has really diminished since 1st PP menses that occurred just after she saw Juana for PP visit 18  Prior to that, BF had been going really well- did start supplementing shortly after delivery with formula due to PP infection. Pt concerned about low milk supply, feels infant getting frustrated.   Puts infant to breast every 2 hours, Infant does latch- <5 minutes, starts crying-  Is pumping right after- gets <1oz. Pumps for 25 minutes-30 minutes, depends how long baby is sleeping, reports suction hurts  We discussed breast massage  Isn't wearing tight bra in between feeds  Breasts are soft, not engorged- used to be really full,   We discussed manual expression- moving back towards areola vs squeezing nipple. Is well hydrated  Started nexplanon- 18. ?correct size flange  Discussed need to be seen to make plan for re-building milk supply    AMY Hollis note 18: Offered lactation consultation; Haritha declined today and will call the clinic if she desires an order for lactation consult.     Pt callback: [phone]

## 2018-04-17 NOTE — TELEPHONE ENCOUNTER
Spoke with AMY Hollis in person, ok to put in Lact referral. Pt selected [facility] location and she has the number to contact them and set up appt. Pt has no other questions at this time.

## 2018-05-03 ENCOUNTER — OFFICE VISIT (OUTPATIENT)
Dept: URGENT CARE | Facility: URGENT CARE | Age: 28
End: 2018-05-03
Payer: COMMERCIAL

## 2018-05-03 VITALS
HEART RATE: 69 BPM | WEIGHT: 172.5 LBS | SYSTOLIC BLOOD PRESSURE: 112 MMHG | OXYGEN SATURATION: 98 % | DIASTOLIC BLOOD PRESSURE: 80 MMHG | RESPIRATION RATE: 17 BRPM | TEMPERATURE: 98.3 F | BODY MASS INDEX: 28.71 KG/M2

## 2018-05-03 DIAGNOSIS — R07.0 THROAT PAIN: Primary | ICD-10-CM

## 2018-05-03 LAB
DEPRECATED S PYO AG THROAT QL EIA: NORMAL
SPECIMEN SOURCE: NORMAL

## 2018-05-03 PROCEDURE — 87081 CULTURE SCREEN ONLY: CPT | Performed by: PHYSICIAN ASSISTANT

## 2018-05-03 PROCEDURE — 87880 STREP A ASSAY W/OPTIC: CPT | Performed by: PHYSICIAN ASSISTANT

## 2018-05-03 PROCEDURE — 99213 OFFICE O/P EST LOW 20 MIN: CPT | Performed by: PHYSICIAN ASSISTANT

## 2018-05-03 RX ORDER — IBUPROFEN 800 MG/1
800 TABLET, FILM COATED ORAL EVERY 8 HOURS PRN
Qty: 30 TABLET | Refills: 0 | Status: SHIPPED | OUTPATIENT
Start: 2018-05-03 | End: 2018-07-11

## 2018-05-03 NOTE — MR AVS SNAPSHOT
After Visit Summary   5/3/2018    Haritha Bateman    MRN: [id number]           Patient Information     Date Of Birth          1990        Visit Information        Provider Department      5/3/2018 6:55 PM Najma Meyers PA-C [facility]        Today's Diagnoses     Throat pain    -  1      Care Instructions    (R07.0) Throat pain  (primary encounter diagnosis)  Comment:   Plan: Rapid strep screen, Beta strep group A culture,        ibuprofen (ADVIL/MOTRIN) 800 MG tablet          Salt water gargles     Rest.                Follow-ups after your visit        Who to contact     If you have questions or need follow up information about today's clinic visit or your schedule please contact [facility] directly at [phone].  Normal or non-critical lab and imaging results will be communicated to you by MyChart, letter or phone within 4 business days after the clinic has received the results. If you do not hear from us within 7 days, please contact the clinic through MyChart or phone. If you have a critical or abnormal lab result, we will notify you by phone as soon as possible.  Submit refill requests through M[employer] or call your pharmacy and they will forward the refill request to us. Please allow 3 business days for your refill to be completed.          Additional Information About Your Visit        MyChart Information     [employer] gives you secure access to your electronic health record. If you see a primary care provider, you can also send messages to your care team and make appointments. If you have questions, please call your primary care clinic.  If you do not have a primary care provider, please call [phone] and they will assist you.        Care EveryWhere ID     This is your Care EveryWhere ID. This could be used by other organizations to access your [facility] medical records  [id number]        Your Vitals Were     Pulse  Temperature Respirations Pulse Oximetry BMI (Body Mass Index)       69 98.3  F (36.8  C) (Oral) 17 98% 28.71 kg/m2        Blood Pressure from Last 3 Encounters:   05/03/18 112/80   04/06/18 108/60   03/07/18 118/74    Weight from Last 3 Encounters:   05/03/18 172 lb 8 oz (78.2 kg)   04/06/18 169 lb (76.7 kg)   03/07/18 170 lb (77.1 kg)              We Performed the Following     Beta strep group A culture     Rapid strep screen          Today's Medication Changes          These changes are accurate as of 5/3/18  7:56 PM.  If you have any questions, ask your nurse or doctor.               Start taking these medicines.        Dose/Directions    ibuprofen 800 MG tablet   Commonly known as:  ADVIL/MOTRIN   Used for:  Throat pain        Dose:  800 mg   Take 1 tablet (800 mg) by mouth every 8 hours as needed for moderate pain   Quantity:  30 tablet   Refills:  0            Where to get your medicines      These medications were sent to [facility] [address] [address]     Phone:  [phone]     ibuprofen 800 MG tablet                Primary Care Provider Fax #    Physician No Ref-Primary [phone]       No address on file        Equal Access to Services     ADEOLA IBRAHIM AH: Nieves tenorioo Sokrisali, waaxda luqadaha, qaybta kaalmada adeegyada, waxay andry snyder. So [facility] [phone].    ATENCIÓN: Si habla español, tiene a mishra disposición servicios gratuitos de asistencia lingüística. Llame al [phone].    We comply with applicable federal civil rights laws and [place] laws. We do not discriminate on the basis of race, color, national origin, age, disability, sex, sexual orientation, or gender identity.            Thank you!     Thank you for choosing [facility]  for your care. Our goal is always to provide you with excellent care. Hearing back from our patients is one way we can continue  to improve our services. Please take a few minutes to complete the written survey that you may receive in the mail after your visit with us. Thank you!             Your Updated Medication List - Protect others around you: Learn how to safely use, store and throw away your medicines at www.disposemymeds.org.          This list is accurate as of 5/3/18  7:56 PM.  Always use your most recent med list.                   Brand Name Dispense Instructions for use Diagnosis    etonogestrel 68 MG Impl    IMPLANON/NEXPLANON    1 each    1 each (68 mg) by Subdermal route once for 1 dose    Encounter for initial prescription of Nexplanon       ibuprofen 800 MG tablet    ADVIL/MOTRIN    30 tablet    Take 1 tablet (800 mg) by mouth every 8 hours as needed for moderate pain    Throat pain       PRENATAL PLUS 27-1 MG Tabs     100 tablet    Take 1 tablet by mouth daily    Supervision of high risk pregnancy in third trimester, 33 weeks gestation of pregnancy       UNKNOWN TO PATIENT

## 2018-05-04 LAB
BACTERIA SPEC CULT: NORMAL
SPECIMEN SOURCE: NORMAL

## 2018-05-04 NOTE — PATIENT INSTRUCTIONS
(R07.0) Throat pain  (primary encounter diagnosis)  Comment:   Plan: Rapid strep screen, Beta strep group A culture,        ibuprofen (ADVIL/MOTRIN) 800 MG tablet          Salt water gargles     Rest.

## 2018-05-04 NOTE — PROGRESS NOTES
SUBJECTIVE:   Haritha Bateman is a 28 year old female presenting with a chief complaint of   1) sore throat today  2) chills for 2 days  3) slight nausea, no emesis    Her daughter has a stomach virus currently (2.5 month old).  Onset of symptoms was as above.  Course of illness is worsening.    Severity moderate  Current and Associated symptoms: as above  Treatment measures tried include salt water gargles.  Predisposing factors include None.    Past Medical History:   Diagnosis Date     NO ACTIVE PROBLEMS 09/22/2017     Patient Active Problem List   Diagnosis     Nexplanon in place     Social History   Substance Use Topics     Smoking status: Never Smoker     Smokeless tobacco: Never Used     Alcohol use No       ROS:  CONSTITUTIONAL:as per HPI  INTEGUMENTARY/SKIN: NEGATIVE for worrisome rashes, moles or lesions  ENT/MOUTH: as per HPI  RESP:NEGATIVE for significant cough or SOB  CV: NEGATIVE for chest pain, palpitations or peripheral edema  GI: NEGATIVE for nausea, abdominal pain, heartburn, or change in bowel habits  MUSCULOSKELETAL: NEGATIVE for significant arthralgias or myalgia  NEURO: NEGATIVE for weakness, dizziness or paresthesias  Review of systems negative except as stated above.    OBJECTIVE  :/80  Pulse 69  Temp 98.3  F (36.8  C) (Oral)  Resp 17  Wt 172 lb 8 oz (78.2 kg)  SpO2 98%  BMI 28.71 kg/m2  GENERAL APPEARANCE: healthy, alert and no distress  EYES: EOMI,  PERRL, conjunctiva clear  HENT: ear canals and TM's normal.  Nose and mouth without ulcers, erythema or lesions  HENT: OP is mildly erythematous  NECK: supple, with subtle tender anterior cervical lymphadenopathy  RESP: lungs clear to auscultation - no rales, rhonchi or wheezes  CV: regular rates and rhythm, normal S1 S2, no murmur noted  SKIN: no suspicious lesions or rashes    (R07.0) Throat pain  (primary encounter diagnosis)  Comment:   Plan: Rapid strep screen, Beta strep group A culture,        ibuprofen (ADVIL/MOTRIN) 800 MG  tablet          Salt water gargles     Rest.      Patient expresses understanding and agreement with the assessment and plan as above.

## 2018-07-11 ENCOUNTER — OFFICE VISIT (OUTPATIENT)
Dept: MIDWIFE SERVICES | Facility: CLINIC | Age: 28
End: 2018-07-11
Payer: COMMERCIAL

## 2018-07-11 VITALS
DIASTOLIC BLOOD PRESSURE: 74 MMHG | HEIGHT: 65 IN | WEIGHT: 168 LBS | SYSTOLIC BLOOD PRESSURE: 110 MMHG | BODY MASS INDEX: 27.99 KG/M2

## 2018-07-11 DIAGNOSIS — Z30.46 NEXPLANON REMOVAL: Primary | ICD-10-CM

## 2018-07-11 PROBLEM — Z97.5 NEXPLANON IN PLACE: Status: RESOLVED | Noted: 2018-04-06 | Resolved: 2018-07-11

## 2018-07-11 PROCEDURE — 11982 REMOVE DRUG IMPLANT DEVICE: CPT | Performed by: ADVANCED PRACTICE MIDWIFE

## 2018-07-11 NOTE — MR AVS SNAPSHOT
"              After Visit Summary   7/11/2018    Haritha Bateman    MRN: [id number]           Patient Information     Date Of Birth          1990        Visit Information        Provider Department      7/11/2018 10:30 AM Romi Goldsmith APRN CNM [facility]        Today's Diagnoses     Nexplanon removal    -  1       Follow-ups after your visit        Who to contact     If you have questions or need follow up information about today's clinic visit or your schedule please contact [facility] directly at [phone].  Normal or non-critical lab and imaging results will be communicated to you by Cloudyn[employer], letter or phone within 4 business days after the clinic has received the results. If you do not hear from us within 7 days, please contact the clinic through Cloudynhart or phone. If you have a critical or abnormal lab result, we will notify you by phone as soon as possible.  Submit refill requests through [employer] or call your pharmacy and they will forward the refill request to us. Please allow 3 business days for your refill to be completed.          Additional Information About Your Visit        MyChart Information     [employer] gives you secure access to your electronic health record. If you see a primary care provider, you can also send messages to your care team and make appointments. If you have questions, please call your primary care clinic.  If you do not have a primary care provider, please call [phone] and they will assist you.        Care EveryWhere ID     This is your Care EveryWhere ID. This could be used by other organizations to access your [facility] medical records  [id number]        Your Vitals Were     Height BMI (Body Mass Index)                5' 5\" (1.651 m) 27.96 kg/m2           Blood Pressure from Last 3 Encounters:   07/11/18 110/74   05/03/18 112/80   04/06/18 108/60    Weight from Last 3 Encounters:   07/11/18 168 lb (76.2 kg)   05/03/18 " 172 lb 8 oz (78.2 kg)   04/06/18 169 lb (76.7 kg)              We Performed the Following     NEXPLANON REMOVAL        Primary Care Provider Fax #    Physician No Ref-Primary [phone]       No address on file        Equal Access to Services     ADEOLA PATRICE : Nieves bucio ku jonao Soomaali, waaxda luqadaha, qaybta kaalmada adeegyada, cayetano browning laCatalinogisela snyder. So [facility] [phone].    ATENCIÓN: Si habla español, tiene a mishra disposición servicios gratuitos de asistencia lingüística. Llame al [phone].    We comply with applicable federal civil rights laws and [place] laws. We do not discriminate on the basis of race, color, national origin, age, disability, sex, sexual orientation, or gender identity.            Thank you!     Thank you for choosing [facility] [facility]  for your care. Our goal is always to provide you with excellent care. Hearing back from our patients is one way we can continue to improve our services. Please take a few minutes to complete the written survey that you may receive in the mail after your visit with us. Thank you!             Your Updated Medication List - Protect others around you: Learn how to safely use, store and throw away your medicines at www.disposemymeds.org.          This list is accurate as of 7/11/18 11:00 AM.  Always use your most recent med list.                   Brand Name Dispense Instructions for use Diagnosis    etonogestrel 68 MG Impl    IMPLANON/NEXPLANON    1 each    1 each (68 mg) by Subdermal route once for 1 dose    Encounter for initial prescription of Nexplanon       PRENATAL PLUS 27-1 MG Tabs     100 tablet    Take 1 tablet by mouth daily    Supervision of high risk pregnancy in third trimester, 33 weeks gestation of pregnancy

## 2018-07-11 NOTE — PROGRESS NOTES
INDICATIONS:                                                      Haritha is here today for removal of Nexplanon contraceptive implant. Haritha states that since delivery of her daughter she was having increased symptoms of GERD and was diagnosed with H. Pylori and subsequently treated with antibiotics. She continues to have upper abdominal pain and is concerned it is related to her Nexplanon. She would like the Nexplanon removed today.     Is a pregnancy test required: No.  Was a consent obtained?  Yes    PROCEDURE:                                                      Patient was placed in dorsal supine position with left arm abducted and externally rotated. Nexplanon was palpated under skin. The area was cleansed with betadine. The distal site was injected with 3 ml of 1% plain lidocaine.  While pushing down on the proximal end, 2 mm incision was made over the distal implant with a scalpel. The implant was grasped with a mosquito forceps and removed intact. The skin was closed with a bandaid. A pressure bandage was placed for the next 12-24 hours.  She tolerated the procedure well. There were no complications. Patient was discharged in stable condition.    Call if bleeding, pain or fever occur. and Birth control counseling given. Haritha is planning to use condoms for contraception. Discussed with Haritha that her symptoms are not likely to be related to the Nexplanon, however, she should continue monitor her adominal symptoms over the next week and if still present she should follow-up with her primary care provider for further management. Option for CT of abdomen and pelvis. Patient states understanding and comfort with plan of care.     SEMAJ Case  [facility] am serving as a scribe; to document services personally performed by Romi VARGHESE CNM- based on data collection and the provider's statements to me.    Provider Disclosure:  I agree with  above History, Review of Systems, Physical exam and Plan. I have reviewed the content of the documentation and have edited it as needed. I have personally performed the services documented here and the documentation accurately represents those services and the decisions I have made.      ABIMAEL Aguirre

## 2018-08-08 ENCOUNTER — OFFICE VISIT - HEALTHEAST (OUTPATIENT)
Dept: FAMILY MEDICINE | Facility: CLINIC | Age: 28
End: 2018-08-08

## 2018-08-08 DIAGNOSIS — Z11.1 SCREENING FOR TUBERCULOSIS: ICD-10-CM

## 2018-08-08 DIAGNOSIS — Z11.3 SCREEN FOR STD (SEXUALLY TRANSMITTED DISEASE): ICD-10-CM

## 2018-08-08 DIAGNOSIS — Z28.39 IMMUNIZATION DEFICIENCY: ICD-10-CM

## 2018-08-08 ASSESSMENT — MIFFLIN-ST. JEOR: SCORE: 1486.22

## 2018-08-10 LAB
C TRACH DNA SPEC QL PROBE+SIG AMP: NEGATIVE
GAMMA INTERFERON BACKGROUND BLD IA-ACNC: 0.18 IU/ML
M TB IFN-G BLD-IMP: NEGATIVE
MITOGEN IGNF BCKGRD COR BLD-ACNC: -0.05 IU/ML
MITOGEN IGNF BCKGRD COR BLD-ACNC: 0.11 IU/ML
N GONORRHOEA DNA SPEC QL NAA+PROBE: NEGATIVE
QTF INTERPRETATION: NORMAL
QTF MITOGEN - NIL: 9.06 IU/ML
T PALLIDUM AB SER QL: NEGATIVE

## 2018-08-13 ENCOUNTER — COMMUNICATION - HEALTHEAST (OUTPATIENT)
Dept: FAMILY MEDICINE | Facility: CLINIC | Age: 28
End: 2018-08-13

## 2019-08-01 ENCOUNTER — HOSPITAL ENCOUNTER (EMERGENCY)
Facility: CLINIC | Age: 29
Discharge: HOME | End: 2019-08-01
Attending: EMERGENCY MEDICINE | Admitting: EMERGENCY MEDICINE
Payer: COMMERCIAL

## 2019-08-01 ENCOUNTER — APPOINTMENT (OUTPATIENT)
Dept: ULTRASOUND IMAGING | Facility: CLINIC | Age: 29
End: 2019-08-01
Attending: EMERGENCY MEDICINE
Payer: COMMERCIAL

## 2019-08-01 VITALS
BODY MASS INDEX: 26.13 KG/M2 | OXYGEN SATURATION: 98 % | RESPIRATION RATE: 16 BRPM | SYSTOLIC BLOOD PRESSURE: 101 MMHG | DIASTOLIC BLOOD PRESSURE: 68 MMHG | HEART RATE: 69 BPM | TEMPERATURE: 98.4 F | WEIGHT: 157 LBS

## 2019-08-01 DIAGNOSIS — R82.71 BACTERIURIA: ICD-10-CM

## 2019-08-01 DIAGNOSIS — R10.9 ABDOMINAL PAIN DURING PREGNANCY IN SECOND TRIMESTER: ICD-10-CM

## 2019-08-01 DIAGNOSIS — O26.892 ABDOMINAL PAIN DURING PREGNANCY IN SECOND TRIMESTER: ICD-10-CM

## 2019-08-01 LAB
ALBUMIN UR-MCNC: NEGATIVE MG/DL
APPEARANCE UR: CLEAR
BACTERIA #/AREA URNS HPF: ABNORMAL /HPF
BILIRUB UR QL STRIP: NEGATIVE
COLOR UR AUTO: YELLOW
GLUCOSE UR STRIP-MCNC: NEGATIVE MG/DL
HCG UR QL: POSITIVE
HGB UR QL STRIP: NEGATIVE
KETONES UR STRIP-MCNC: NEGATIVE MG/DL
LEUKOCYTE ESTERASE UR QL STRIP: NEGATIVE
MUCOUS THREADS #/AREA URNS LPF: PRESENT /LPF
NITRATE UR QL: NEGATIVE
PH UR STRIP: 6.5 PH (ref 5–7)
RBC #/AREA URNS AUTO: 1 /HPF (ref 0–2)
SOURCE: ABNORMAL
SP GR UR STRIP: 1.02 (ref 1–1.03)
SQUAMOUS #/AREA URNS AUTO: 1 /HPF (ref 0–1)
UROBILINOGEN UR STRIP-MCNC: NORMAL MG/DL (ref 0–2)
WBC #/AREA URNS AUTO: 2 /HPF (ref 0–5)

## 2019-08-01 PROCEDURE — 99284 EMERGENCY DEPT VISIT MOD MDM: CPT | Mod: Z6 | Performed by: EMERGENCY MEDICINE

## 2019-08-01 PROCEDURE — 81025 URINE PREGNANCY TEST: CPT | Performed by: FAMILY MEDICINE

## 2019-08-01 PROCEDURE — 81001 URINALYSIS AUTO W/SCOPE: CPT | Performed by: FAMILY MEDICINE

## 2019-08-01 PROCEDURE — 99284 EMERGENCY DEPT VISIT MOD MDM: CPT | Mod: 25 | Performed by: EMERGENCY MEDICINE

## 2019-08-01 PROCEDURE — 76805 OB US >/= 14 WKS SNGL FETUS: CPT

## 2019-08-01 RX ORDER — CEPHALEXIN 500 MG/1
500 CAPSULE ORAL 4 TIMES DAILY
Qty: 20 CAPSULE | Refills: 0 | Status: ON HOLD | OUTPATIENT
Start: 2019-08-01 | End: 2019-10-12

## 2019-08-01 RX ORDER — CEPHALEXIN 500 MG/1
500 CAPSULE ORAL 4 TIMES DAILY
Qty: 28 CAPSULE | Refills: 0 | Status: SHIPPED | OUTPATIENT
Start: 2019-08-01 | End: 2019-08-01

## 2019-08-01 ASSESSMENT — ENCOUNTER SYMPTOMS
VOMITING: 0
COLOR CHANGE: 0
CONFUSION: 0
SHORTNESS OF BREATH: 0
ARTHRALGIAS: 0
HEMATURIA: 0
EYE REDNESS: 0
DIFFICULTY URINATING: 0
NECK STIFFNESS: 0
CHILLS: 0
NAUSEA: 0
FEVER: 0
DYSURIA: 0
FREQUENCY: 1
ABDOMINAL PAIN: 0
HEADACHES: 0

## 2019-08-01 NOTE — ED AVS SNAPSHOT
[facility], [place], Emergency Department  [address]  [facility] [address]  Phone:  [phone]  Fax:  [phone]                                    Haritha Bateman   MRN: [id number]    Department:  [facility], Emergency Department   Date of Visit:  8/1/2019           After Visit Summary Signature Page    I have received my discharge instructions, and my questions have been answered. I have discussed any challenges I see with this plan with the nurse or doctor.    ..........................................................................................................................................  Patient/Patient Representative Signature      ..........................................................................................................................................  Patient Representative Print Name and Relationship to Patient    ..................................................               ................................................  Date                                   Time    ..........................................................................................................................................  Reviewed by Signature/Title    ...................................................              ..............................................  Date                                               Time          22EPIC Rev 08/18

## 2019-08-02 NOTE — DISCHARGE INSTRUCTIONS
Continue with regular OB follow-up. Return to the emergency department if symptoms continue, get worse, there are any new symptoms or any cause for concern.

## 2019-08-02 NOTE — ED PROVIDER NOTES
[facility] EMERGENCY DEPARTMENT ([facility])    19       History     Chief Complaint   Patient presents with     Pelvic Pressure     reports has had pelvic pressure for the past 3-4 days, reports she is approximately 17 weeks pregnant, she has had urinary frequency, denies bleeding but has small amounts of clear discharge     HPI  Haritha Bateman is a 29 year old , otherwise healthy female at 17w1d gestation who presents to the Emergency Department for evaluation of 3 to 4 days of lower, mid pelvic pressure.  The patient reports that this pressure has been constant and has been gradually worsening.  She reports that the pain is improved with laying flat.  She denies any abdominal cramping and denies any vaginal bleeding.  She does report that she has a clear vaginal discharge, but she reports that this is not unusual for her.  The patient does note some mild urinary frequency, but she denies any other urinary symptoms.  She denies any fevers, chills, nausea or vomiting.  She denies any positive sick contacts.  The patient does report that she was treated for BV with oral antibiotics when she was 12 weeks pregnant, but at that time she was having foul-smelling discharge.  The patient's last OB appointment was a couple of weeks ago and she is currently scheduled to have a follow-up ultrasound in the next few days as she is planning on leaving the country.  Patient has been taking her prenatal vitamins.  She denies any alcohol use, drug use and she denies any smoking.  No other symptoms noted.    I have reviewed the Medications, Allergies, Past Medical and Surgical History, and Social History in the [employer] system.    Past Medical History:   Diagnosis Date     NO ACTIVE PROBLEMS 2017       Past Surgical History:   Procedure Laterality Date     NO HISTORY OF SURGERY  2017       Family History   Problem Relation Age of Onset     Hypertension Mother      Hypertension Paternal Grandmother       Other Cancer Paternal Grandmother        Social History     Tobacco Use     Smoking status: Never Smoker     Smokeless tobacco: Never Used   Substance Use Topics     Alcohol use: No       No current facility-administered medications for this encounter.      Current Outpatient Medications   Medication     Prenatal Vit-Fe Fumarate-FA (PRENATAL PLUS) 27-1 MG TABS     etonogestrel (IMPLANON/NEXPLANON) 68 MG IMPL        Allergies   Allergen Reactions     Carafate [Sucralfate] Other (See Comments)     Dizziness, nausea and vomiting, sweating         Review of Systems   Constitutional: Negative for chills and fever.   HENT: Negative for congestion.    Eyes: Negative for redness.   Respiratory: Negative for shortness of breath.    Cardiovascular: Negative for chest pain.   Gastrointestinal: Negative for abdominal pain, nausea and vomiting.   Genitourinary: Positive for frequency and pelvic pain. Negative for decreased urine volume, difficulty urinating, dysuria, hematuria, urgency and vaginal bleeding.   Musculoskeletal: Negative for arthralgias and neck stiffness.   Skin: Negative for color change.   Neurological: Negative for headaches.   Psychiatric/Behavioral: Negative for confusion.   All other systems reviewed and are negative.      Physical Exam   BP: 101/68  Pulse: 69  Temp: 98.4  F (36.9  C)  Resp: 16  Weight: 71.2 kg (157 lb)  SpO2: 98 %      Physical Exam   Constitutional: She is oriented to person, place, and time. She appears well-developed and well-nourished. No distress.   HENT:   Head: Normocephalic and atraumatic.   Mouth/Throat: No oropharyngeal exudate.   Eyes: Pupils are equal, round, and reactive to light. Right eye exhibits no discharge. Left eye exhibits no discharge. No scleral icterus.   Neck: Normal range of motion. Neck supple.   Cardiovascular: Normal rate, regular rhythm, normal heart sounds and intact distal pulses. Exam reveals no gallop and no friction rub.   No murmur  heard.  Pulmonary/Chest: Effort normal and breath sounds normal. No respiratory distress. She has no wheezes. She exhibits no tenderness.   Abdominal: Soft. Bowel sounds are normal. She exhibits no distension. There is tenderness (minimal) in the suprapubic area.   Musculoskeletal: Normal range of motion. She exhibits no edema, tenderness or deformity.   Neurological: She is alert and oriented to person, place, and time. No cranial nerve deficit.   Skin: Skin is warm and dry. No rash noted. She is not diaphoretic. No erythema. No pallor.   Psychiatric: She has a normal mood and affect.   Nursing note and vitals reviewed.      ED Course        Procedures             Critical Care time:  none             Labs Ordered and Resulted from Time of ED Arrival Up to the Time of Departure from the ED   HCG QUALITATIVE URINE - Abnormal; Notable for the following components:       Result Value    HCG Qual Urine Positive (*)     All other components within normal limits   ROUTINE UA WITH MICROSCOPIC - Abnormal; Notable for the following components:    Bacteria Urine Few (*)     Mucous Urine Present (*)     All other components within normal limits            Assessments & Plan (with Medical Decision Making)   This is a 29-year-old female G2, P1 at approximately 17 weeks gestation who presents with pelvic pressure.  This is been ongoing for several days.  Not had any similar occurrences with this or previous pregnancies.  On exam patient is well-appearing she does have some slight suprapubic tenderness.  No other abnormalities. Patient declined pelvic exam.  Lab work shows bacteria in the urine but no overt UTI.  OB ultrasound shows intrauterine pregnancy with an age of approximately 18 weeks.  I discussed all results with patient.  She does have bacteria in her urine we will treat this.  Encouraged regular OB follow-ups. Will discharge home with return precautions. Discussed reasons to return to the emergency department.   Patient understands and agrees with this plan.    I have reviewed the nursing notes.    I have reviewed the findings, diagnosis, plan and need for follow up with the patient.       Medication List      There are no discharge medications for this visit.         Final diagnoses:   None     IBravo, am serving as a trained medical scribe to document services personally performed by Andi Russ DO, based on the provider's statements to me.   Andi TREVINO DO, was physically present and have reviewed and verified the accuracy of this note documented by Bravo Castaneda.    8/1/2019   [facility], [place], EMERGENCY DEPARTMENT     Andi Russ DO  08/02/19 0149

## 2019-10-12 ENCOUNTER — HOSPITAL ENCOUNTER (OUTPATIENT)
Facility: CLINIC | Age: 29
Discharge: HOME | End: 2019-10-12
Attending: OBSTETRICS & GYNECOLOGY | Admitting: OBSTETRICS & GYNECOLOGY
Payer: COMMERCIAL

## 2019-10-12 ENCOUNTER — HOSPITAL ENCOUNTER (OUTPATIENT)
Facility: CLINIC | Age: 29
End: 2019-10-12
Admitting: OBSTETRICS & GYNECOLOGY
Payer: COMMERCIAL

## 2019-10-12 VITALS — SYSTOLIC BLOOD PRESSURE: 122 MMHG | DIASTOLIC BLOOD PRESSURE: 75 MMHG | TEMPERATURE: 98.8 F

## 2019-10-12 LAB
ALBUMIN UR-MCNC: NEGATIVE MG/DL
AMPHETAMINES UR QL SCN: NEGATIVE
APPEARANCE UR: CLEAR
BACTERIA #/AREA URNS HPF: ABNORMAL /HPF
BILIRUB UR QL STRIP: NEGATIVE
CANNABINOIDS UR QL: NEGATIVE
COCAINE UR QL: NEGATIVE
COLOR UR AUTO: ABNORMAL
FIBRONECTIN FETAL VAG QL: NORMAL
GLUCOSE UR STRIP-MCNC: NEGATIVE MG/DL
HGB UR QL STRIP: NEGATIVE
KETONES UR STRIP-MCNC: NEGATIVE MG/DL
LEUKOCYTE ESTERASE UR QL STRIP: NEGATIVE
MUCOUS THREADS #/AREA URNS LPF: PRESENT /LPF
NITRATE UR QL: NEGATIVE
OPIATES UR QL SCN: NEGATIVE
PCP UR QL SCN: NEGATIVE
PH UR STRIP: 5.5 PH (ref 5–7)
RBC #/AREA URNS AUTO: <1 /HPF (ref 0–2)
RUPTURE OF FETAL MEMBRANES BY ROM PLUS: NEGATIVE
SOURCE: ABNORMAL
SP GR UR STRIP: 1.01 (ref 1–1.03)
SPECIMEN SOURCE: NORMAL
SQUAMOUS #/AREA URNS AUTO: 3 /HPF (ref 0–1)
UROBILINOGEN UR STRIP-MCNC: NORMAL MG/DL (ref 0–2)
WBC #/AREA URNS AUTO: 2 /HPF (ref 0–5)
WET PREP SPEC: NORMAL

## 2019-10-12 PROCEDURE — 84112 EVAL AMNIOTIC FLUID PROTEIN: CPT | Performed by: ADVANCED PRACTICE MIDWIFE

## 2019-10-12 PROCEDURE — 40000268 ZZH STATISTIC NO CHARGES

## 2019-10-12 PROCEDURE — 80307 DRUG TEST PRSMV CHEM ANLYZR: CPT | Performed by: ADVANCED PRACTICE MIDWIFE

## 2019-10-12 PROCEDURE — 87491 CHLMYD TRACH DNA AMP PROBE: CPT | Performed by: ADVANCED PRACTICE MIDWIFE

## 2019-10-12 PROCEDURE — 87210 SMEAR WET MOUNT SALINE/INK: CPT | Performed by: ADVANCED PRACTICE MIDWIFE

## 2019-10-12 PROCEDURE — 81001 URINALYSIS AUTO W/SCOPE: CPT | Performed by: ADVANCED PRACTICE MIDWIFE

## 2019-10-12 PROCEDURE — 87591 N.GONORRHOEAE DNA AMP PROB: CPT | Performed by: ADVANCED PRACTICE MIDWIFE

## 2019-10-12 PROCEDURE — G0463 HOSPITAL OUTPT CLINIC VISIT: HCPCS

## 2019-10-12 NOTE — PROGRESS NOTES
Discussed with Haritha Bateman that based on one or more risk factors identified, a urine drug screen is recommended as best practice for  care.  Risk factor(s) identified were limited prenatal care and rule out  labor were discussed with patient and Haritha Bateman agrees to urine testing during pregnancy and/or on admission for the risk factor(s) identified.       Jose De Guzman CNM

## 2019-10-12 NOTE — DISCHARGE INSTRUCTIONS
Discharge Instruction for Undelivered Patients      You were seen for: lower abdominal pain  We Consulted: Dr. Brar and Dr. Winkler  You had (Test or Medicine): fetal non-stress test, wet prep, GC/chlam      Diet:   Drink 8 to 12 glasses of liquids (milk, juice, water) every day.  You may eat meals and snacks.     Activity:  Count fetal kicks everyday (see handout)  Call your doctor or nurse midwife if your baby is moving less than usual.     Call your provider if you notice:  Swelling in your face or increased swelling in your hands or legs.  Headaches that are not relieved by Tylenol (acetaminophen).  Changes in your vision (blurring: seeing spots or stars.)  Nausea (sick to your stomach) and vomiting (throwing up).   Weight gain of 5 pounds or more per week.  Heartburn that doesn't go away.  Signs of bladder infection: pain when you urinate (use the toilet), need to go more often and more urgently.  The bag of nathan (rupture of membranes) breaks, or you notice leaking in your underwear.  Bright red blood in your underwear.  Abdominal (lower belly) or stomach pain.  For first baby: Contractions (tightening) less than 5 minutes apart for one hour or more.  Second (plus) baby: Contractions (tightening) less than 10 minutes apart and getting stronger.  *If less than 34 weeks: Contractions (tightenings) more than 6 times in one hour.  Increase or change in vaginal discharge (note the color and amount)      Follow-up:  As scheduled in the clinic

## 2019-10-12 NOTE — ED NOTES
Pt arrived to ED with complaint of cervical pain  Pt reports 27weeks pregnant.   Pt is having contractions No.   Pt feels urge to push No.   Pt reports water broke No.   Report was called and pt was transferred to L&D Yes.

## 2019-10-12 NOTE — PROGRESS NOTES
[facility]  OB Triage Note    CC: Leaking fluid from vagina    HPI: Ms. Haritha Bateman is a 29 year old  at 27w3d by 9w5d US, who presents with vaginal pain and clear vaginal discharge. She denies contractions, leaking fluid, vaginal bleeding.  +Good fetal movement.    Denies fevers, chills, HA, changes in vision, nausea, emesis, SOB, chest pain, palpitations, RUQ pain, increased edema.     She reports pain at her cervix intermittently today lasting 10-15 second at each occurrence. Denies similar pain in the past. No vaginal burning, vaginal itching, dysuria.     Pregnancy complications:  - Lapse in prenatal care since 17w6d    O:  Patient Vitals for the past 24 hrs:   Temp Temp src   10/12/19 1540 98.8  F (37.1  C) Oral     Gen: Well-appearing, NAD  CV: Well perfused  Pulm: Non-labored breathing on room air  Abd: Soft, gravid, non-tender   Ext: no LE edema b/l    Spec: No pooling of fluid, no blood in vaginal vault, normal closed appearing cervix per Jose De Guzman CNM    Cervix: Closed/Long/High per Isamar Brar MD PGY-4    FHT: Baseline 140, moderate variability, +accelerations, no decelerations  Dinosaur: 0 ctx in 10 mins    Labs:  Wet prep, UA, GC/CT, ROMplus    A/P:  Ms. Haritha Bateman is a 29 year old  at 27w3d by 9w5d US here with intermittent, feeting vaginal pain and clear vaginal discharge.    1. R/o ROM  - No pooling of fluid in vagina on speculum exam per Jose De Guzman CNM. ROMplus negative.     2. Vaginal pain  - Likely musculoskeletal pain and pregnancy related pain. Cervix C/L/H. Wet prep unremarkable. UA, GC/CT pending.     3. FWB:  - Category I FHT, reactive, appropriate for gestational age    4. PNC  - patient will follow up as scheduled with her [facility] midwife in the next couple days to re-establish prenatal care after a nearly 10 week lapse     Kenny Wallace MD  Ob/Gyn Resident, PGY-1  10/12/19 5:03 PM    Romi Winkler MD

## 2019-10-12 NOTE — PROGRESS NOTES
"Data: [facility] at 1530.   Reason for maternal/fetal assessment per patient is Abdominal Pain  . Patient is a . Prenatal record reviewed.      OB History    Para Term  AB Living   2 1 1 0 0 1   SAB TAB Ectopic Multiple Live Births   0 0 0 0 1      # Outcome Date GA Lbr Delfin/2nd Weight Sex Delivery Anes PTL Lv   2 Current            1 Term 18 41w5d 12:36 / 01:29 3.03 kg (6 lb 10.9 oz) F Vag-Spont IV REGIONAL, EPI N ELEUTERIO      Name: FILIPPO,BABYLibertad LUJIMBO      Apgar1: 1  Apgar5: 7      Medical History:   Past Medical History:   Diagnosis Date     NO ACTIVE PROBLEMS 2017   . Gestational Age 27w3d. VSS. Cervix: closed.  Fetal movement present. Patient reports occasional sharp pains \"in my cervix\" that lasts for about 10 seconds at a time and worsens when standing. Patient denies cramping, backache, vaginal discharge, pelvic pressure, UTI symptoms, GI problems, bloody show, vaginal bleeding, edema, headache, visual disturbances, epigastric or URQ pain. Support persons present.  Action: Verbal consent for EFM. Triage assessment completed. EFM applied. Uterine assessment WNL. Fetal assessment: Presumed adequate fetal oxygenation documented (see flow record). Patient education pamphlets given on fetal movement. Patient instructed to report change in fetal movement, vaginal leaking of fluid or bleeding, abdominal pain, or any concerns related to the pregnancy to her nurse/physician.   Response: Jose De Guzman CNM, sIamar Brar and Dr. Winkler informed of patient arrival and complaints. Plan per provider is discharge home. Patient verbalized understanding of education and verbalized agreement with plan. Discharged ambulatory at 1712       "

## 2019-10-13 LAB
C TRACH DNA SPEC QL NAA+PROBE: NEGATIVE
N GONORRHOEA DNA SPEC QL NAA+PROBE: NEGATIVE
SPECIMEN SOURCE: NORMAL
SPECIMEN SOURCE: NORMAL

## 2020-03-11 ENCOUNTER — HEALTH MAINTENANCE LETTER (OUTPATIENT)
Age: 30
End: 2020-03-11

## 2020-12-27 ENCOUNTER — HEALTH MAINTENANCE LETTER (OUTPATIENT)
Age: 30
End: 2020-12-27

## 2021-04-25 ENCOUNTER — HEALTH MAINTENANCE LETTER (OUTPATIENT)
Age: 31
End: 2021-04-25

## 2021-06-01 VITALS — BODY MASS INDEX: 29.37 KG/M2 | HEIGHT: 64 IN | WEIGHT: 172 LBS

## 2021-06-19 NOTE — PROGRESS NOTES
ASSESMENT AND PLAN:  Diagnoses and all orders for this visit:    Immunization deficiency  Immunization review and counseling done today with the patient.  -     Td, Adult, Adsorbed (blue label)    Screen for STD (sexually transmitted disease)  -     Syphilis Screen, [facility]  -     Chlamydia trachomatis & Neisseria gonorrhoeae, Amplified Detection    Screening for tuberculosis  -     QTF-Mycobacterium tuberculosis by QuantiFERON-TB Gold Plus      Green Card/update imm.  Counseling done on immunization history and requirements with the patient.  Reviewed available immunization history and relevant lab records via care everywhere with patient and family.  Immunizations given as documented in the EHR and on form.  Acetaminophen as needed for post-immunization fever or myalgias.  [employer] and [employer] form I-693 completed today with the patient.   Once lab results are returned, if negative will be gIven to patient in a sealed labeled envelope and a copy is being scanned into the EHR.  Please see that form for further details.  Patient directed to follow-up in clinic for routine and preventative care.       SUBJECTIVE: 28-year-old female applying for green card.  She does not have refugee status.  She had a child last year, no other major medical history.  She does not have any past history of any significant mental health or chemical health issues.  No history of immunization reactions or problems in the past.    No past medical history on file.  There is no problem list on file for this patient.    Current Outpatient Prescriptions   Medication Sig Dispense Refill     PNV,calcium 72-iron-folic acid (PRENATAL PLUS, CALCIUM CARB,) 27 mg iron- 1 mg Tab Take by mouth.       No current facility-administered medications for this visit.      History   Smoking Status     Never Smoker   Smokeless Tobacco     Never Used       OBJECTICE: /66 (Patient Site: Left Arm, Patient Position: Sitting, Cuff Size:  "Adult Regular)  Pulse 71  Temp 98.5  F (36.9  C) (Oral)   Resp 20  Ht 5' 3.75\" (1.619 m)  Wt 172 lb (78 kg)  LMP 07/17/2018  SpO2 98%  Breastfeeding? Yes  BMI 29.76 kg/m2     No results found for this or any previous visit (from the past 24 hour(s)).     GEN-alert, appropriate, NAD   CV-RRR, no murmur   RESP-lungs CTA     Shaheed Raman          "

## 2021-10-09 ENCOUNTER — HEALTH MAINTENANCE LETTER (OUTPATIENT)
Age: 31
End: 2021-10-09

## 2021-12-14 ENCOUNTER — HOSPITAL ENCOUNTER (EMERGENCY)
Facility: CLINIC | Age: 31
Discharge: HOME | End: 2021-12-14
Attending: EMERGENCY MEDICINE | Admitting: EMERGENCY MEDICINE
Payer: COMMERCIAL

## 2021-12-14 VITALS
DIASTOLIC BLOOD PRESSURE: 72 MMHG | RESPIRATION RATE: 18 BRPM | HEART RATE: 92 BPM | SYSTOLIC BLOOD PRESSURE: 114 MMHG | TEMPERATURE: 98 F | OXYGEN SATURATION: 100 %

## 2021-12-14 DIAGNOSIS — R51.9 NONINTRACTABLE HEADACHE, UNSPECIFIED CHRONICITY PATTERN, UNSPECIFIED HEADACHE TYPE: ICD-10-CM

## 2021-12-14 DIAGNOSIS — R11.2 NON-INTRACTABLE VOMITING WITH NAUSEA, UNSPECIFIED VOMITING TYPE: ICD-10-CM

## 2021-12-14 LAB
ALBUMIN SERPL-MCNC: 3.3 G/DL (ref 3.4–5)
ALP SERPL-CCNC: 66 U/L (ref 40–150)
ALT SERPL W P-5'-P-CCNC: 16 U/L (ref 0–50)
ANION GAP SERPL CALCULATED.3IONS-SCNC: 8 MMOL/L (ref 3–14)
AST SERPL W P-5'-P-CCNC: 14 U/L (ref 0–45)
BASOPHILS # BLD AUTO: 0 10E3/UL (ref 0–0.2)
BASOPHILS NFR BLD AUTO: 0 %
BILIRUB DIRECT SERPL-MCNC: <0.1 MG/DL (ref 0–0.2)
BILIRUB SERPL-MCNC: 0.3 MG/DL (ref 0.2–1.3)
BUN SERPL-MCNC: 8 MG/DL (ref 7–30)
CALCIUM SERPL-MCNC: 9 MG/DL (ref 8.5–10.1)
CHLORIDE BLD-SCNC: 107 MMOL/L (ref 94–109)
CO2 SERPL-SCNC: 22 MMOL/L (ref 20–32)
CREAT SERPL-MCNC: 0.5 MG/DL (ref 0.52–1.04)
EOSINOPHIL # BLD AUTO: 0 10E3/UL (ref 0–0.7)
EOSINOPHIL NFR BLD AUTO: 1 %
ERYTHROCYTE [DISTWIDTH] IN BLOOD BY AUTOMATED COUNT: 13 % (ref 10–15)
GFR SERPL CREATININE-BSD FRML MDRD: >90 ML/MIN/1.73M2
GLUCOSE BLD-MCNC: 83 MG/DL (ref 70–99)
HCT VFR BLD AUTO: 40.4 % (ref 35–47)
HGB BLD-MCNC: 13.2 G/DL (ref 11.7–15.7)
HOLD SPECIMEN: NORMAL
HOLD SPECIMEN: NORMAL
IMM GRANULOCYTES # BLD: 0 10E3/UL
IMM GRANULOCYTES NFR BLD: 0 %
LYMPHOCYTES # BLD AUTO: 1.5 10E3/UL (ref 0.8–5.3)
LYMPHOCYTES NFR BLD AUTO: 26 %
MCH RBC QN AUTO: 29.3 PG (ref 26.5–33)
MCHC RBC AUTO-ENTMCNC: 32.7 G/DL (ref 31.5–36.5)
MCV RBC AUTO: 90 FL (ref 78–100)
MONOCYTES # BLD AUTO: 0.4 10E3/UL (ref 0–1.3)
MONOCYTES NFR BLD AUTO: 6 %
NEUTROPHILS # BLD AUTO: 4 10E3/UL (ref 1.6–8.3)
NEUTROPHILS NFR BLD AUTO: 67 %
NRBC # BLD AUTO: 0 10E3/UL
NRBC BLD AUTO-RTO: 0 /100
PLATELET # BLD AUTO: 255 10E3/UL (ref 150–450)
POTASSIUM BLD-SCNC: 3.5 MMOL/L (ref 3.4–5.3)
PROT SERPL-MCNC: 8 G/DL (ref 6.8–8.8)
RBC # BLD AUTO: 4.51 10E6/UL (ref 3.8–5.2)
SODIUM SERPL-SCNC: 137 MMOL/L (ref 133–144)
WBC # BLD AUTO: 5.9 10E3/UL (ref 4–11)

## 2021-12-14 PROCEDURE — 250N000013 HC RX MED GY IP 250 OP 250 PS 637: Performed by: EMERGENCY MEDICINE

## 2021-12-14 PROCEDURE — 82248 BILIRUBIN DIRECT: CPT | Performed by: EMERGENCY MEDICINE

## 2021-12-14 PROCEDURE — 85025 COMPLETE CBC W/AUTO DIFF WBC: CPT | Performed by: EMERGENCY MEDICINE

## 2021-12-14 PROCEDURE — 96375 TX/PRO/DX INJ NEW DRUG ADDON: CPT

## 2021-12-14 PROCEDURE — 250N000011 HC RX IP 250 OP 636: Performed by: EMERGENCY MEDICINE

## 2021-12-14 PROCEDURE — 258N000003 HC RX IP 258 OP 636: Performed by: EMERGENCY MEDICINE

## 2021-12-14 PROCEDURE — 36415 COLL VENOUS BLD VENIPUNCTURE: CPT | Performed by: EMERGENCY MEDICINE

## 2021-12-14 PROCEDURE — 99284 EMERGENCY DEPT VISIT MOD MDM: CPT | Mod: 25

## 2021-12-14 PROCEDURE — 96374 THER/PROPH/DIAG INJ IV PUSH: CPT

## 2021-12-14 PROCEDURE — 96361 HYDRATE IV INFUSION ADD-ON: CPT

## 2021-12-14 PROCEDURE — 80048 BASIC METABOLIC PNL TOTAL CA: CPT | Performed by: EMERGENCY MEDICINE

## 2021-12-14 RX ORDER — ASPIRIN 81 MG/1
TABLET, CHEWABLE ORAL
Status: ON HOLD | COMMUNITY
Start: 2021-12-03 | End: 2022-07-01

## 2021-12-14 RX ORDER — METOCLOPRAMIDE 5 MG/1
5-10 TABLET ORAL 4 TIMES DAILY PRN
Qty: 30 TABLET | Refills: 0 | Status: ON HOLD | OUTPATIENT
Start: 2021-12-14 | End: 2022-05-05

## 2021-12-14 RX ORDER — METOCLOPRAMIDE HYDROCHLORIDE 5 MG/ML
10 INJECTION INTRAMUSCULAR; INTRAVENOUS ONCE
Status: COMPLETED | OUTPATIENT
Start: 2021-12-14 | End: 2021-12-14

## 2021-12-14 RX ORDER — DIPHENHYDRAMINE HYDROCHLORIDE 50 MG/ML
25 INJECTION INTRAMUSCULAR; INTRAVENOUS ONCE
Status: COMPLETED | OUTPATIENT
Start: 2021-12-14 | End: 2021-12-14

## 2021-12-14 RX ORDER — ACETAMINOPHEN 500 MG
1000 TABLET ORAL ONCE
Status: COMPLETED | OUTPATIENT
Start: 2021-12-14 | End: 2021-12-14

## 2021-12-14 RX ADMIN — DIPHENHYDRAMINE HYDROCHLORIDE 25 MG: 50 INJECTION INTRAMUSCULAR; INTRAVENOUS at 15:02

## 2021-12-14 RX ADMIN — ACETAMINOPHEN 1000 MG: 500 TABLET, FILM COATED ORAL at 15:01

## 2021-12-14 RX ADMIN — SODIUM CHLORIDE 1000 ML: 9 INJECTION, SOLUTION INTRAVENOUS at 10:39

## 2021-12-14 RX ADMIN — METOCLOPRAMIDE HYDROCHLORIDE 10 MG: 5 INJECTION INTRAMUSCULAR; INTRAVENOUS at 15:03

## 2021-12-14 ASSESSMENT — ENCOUNTER SYMPTOMS
ABDOMINAL PAIN: 0
MYALGIAS: 0
VOMITING: 0
DIARRHEA: 0
CHILLS: 0
HEADACHES: 1
WEAKNESS: 0
NUMBNESS: 0
FEVER: 0

## 2021-12-14 NOTE — ED PROVIDER NOTES
History   Chief Complaint:  Headache    The history is provided by the patient.      Haritha Bateman is a 12 weeks pregnant, 31 year old female who presents with a headache. The patient complains of three days of headache in the frontal lobe and down the back of her neck. She also notes occasionally seeing flashes of light in her visual field. This has never happened before. Her pain has been managed by Tylenol at home, which successfully reduces the pain some, however, the headache never completely resolves. The patient denies any fever, chills, vomiting, numbness, weakness, diarrhea, myalgias, abdominal pain, vaginal bleeding, or morning sickness. She last took tylenol at 0700 today. She has been following with OBGYN.     Review of Systems   Constitutional: Negative for chills and fever.   Eyes: Positive for visual disturbance.   Gastrointestinal: Negative for abdominal pain, diarrhea and vomiting.   Genitourinary: Negative for vaginal bleeding.   Musculoskeletal: Negative for myalgias.   Neurological: Positive for headaches. Negative for weakness and numbness.   All other systems reviewed and are negative.        Allergies:  Sucralfate    Medications:  Aspirin 81 mg    Past Medical History:    Chondral defect of right patella  Eczema  GERD  Endometriosis  Helicobacter pylori injection   UTI  Anemia     Past Surgical History:    [place] teeth extraction      Family History:    Mother: hypertension, thyroid disorder  Brother: asthma, eczema, PUD bleeding    Social History:  The patient presents to the ED alone.     Physical Exam     Patient Vitals for the past 24 hrs:   BP Temp Temp src Pulse Resp SpO2   12/14/21 1447 114/72 -- -- 92 -- 100 %   12/14/21 1006 118/83 98  F (36.7  C) Temporal 92 18 100 %       Physical Exam  Constitutional: Well appearing.  HEENT: Atraumatic.  PERRL.  EOMI.  Moist mucous membranes.  Neck: Soft.  Supple.   Cardiac: Regular rate and rhythm.  No murmur or rub.  Respiratory: Clear to  auscultation bilaterally.  No respiratory distress.    Abdomen: Soft and nontender. Nondistended.  Musculoskeletal: No edema.  Normal range of motion.  Neurologic: Alert and oriented x3.  Normal tone and bulk.  No facial drooping.  Normal speech.  5/5 strength in bilateral upper and lower extremities.  Sensation to light touch intact throughout.  Normal gait.  Skin: No rashes.  No edema.  Psych: Normal affect.  Normal behavior.    Emergency Department Course     Laboratory:  CBC: WBC 5.9, HGB 13.2,      BMP: WNL (Creatinine 0.50(L))     Hepatic Panel: albumin: 3.3(L) o/w WNL    Emergency Department Course:    Reviewed:  I reviewed nursing notes, vitals, past medical history and care everywhere    Assessments:  1448 I obtained history and examined the patient as noted above.     Interventions:  1039 NS 1 L IV   1501 Tylenol 1,000 mg PO   1502 Benadryl 25 mg IV   1503 Reglan 10 mg IV     Disposition:  The patient was discharged to home.     Impression & Plan     Medical Decision Making:  Haritha Bateman is a 29 old woman who is afebrile and hemodynamically stable.  She is neurologically intact no focal deficits at this time.  She has no fever or meningismus my concern for meningitis/encephalitis is exceedingly low.  Lab work-up is noted as above.  She given the above interventions with complete resolution of her headache.  I discussed with her her pregnancy and slightly increased risk for venous sinus thrombosis.  We discussed an MRI MRV and I offered this to her, however, she feels comfortable not doing it at this time.  I think that is very reasonable given her lack of neurologic symptoms and complete resolution with the above interventions.  We discussed plan for home with Reglan and hydration and close OB/GYN and primary care follow-up.  I welcomed her to return if she changes her mind about further work-up or if she worsens or develops any red flag symptoms and she is understanding.  She has no abdominal or  pelvic pain no vaginal bleeding no pregnancy concerns at this time.  She feels countable this plan her questions were answered.  Strict return precautions were given.  She was in no distress at time of discharge.    Covid-19  Haritha Bateman was evaluated during a global COVID-19 pandemic, which necessitated consideration that the patient might be at risk for infection with the SARS-CoV-2 virus that causes COVID-19.   Applicable protocols for evaluation were followed during the patient's care.     Diagnosis:    ICD-10-CM    1. Nonintractable headache, unspecified chronicity pattern, unspecified headache type  R51.9    2. Non-intractable vomiting with nausea, unspecified vomiting type  R11.2        Discharge Medications:  New Prescriptions    METOCLOPRAMIDE (REGLAN) 5 MG TABLET    Take 1-2 tablets (5-10 mg) by mouth 4 times daily as needed (nausea and vomiting)       Scribe Disclosure:  Roderick TREVINO, am serving as a scribe at 3:06 PM on 12/14/2021 to document services personally performed by Kenny Price MD based on my observations and the provider's statements to me.            Kenny Price MD  12/14/21 1919

## 2021-12-14 NOTE — ED TRIAGE NOTES
Pt presents to ED with headaches since Saturday. Pt takes Tylenol every 4 hours, which helps but headache comes back. Pt reports flashing lights when her headache gets severe (onset Sunday). Pt reports intermittent nausea and vomiting, and unable to keep any food or fluids down. Pt states she gets dizzy when she lays down. Denies any vaginal bleeding or cramping. Last Tylenol at 0700. ABCs intact. Pt A&OX4.

## 2021-12-14 NOTE — DISCHARGE INSTRUCTIONS
Discharge Instructions  Headache    You were seen today for a headache. Headaches may be caused by many different things such as muscle tension, sinus inflammation, anxiety and stress, having too little sleep, too much alcohol, some medical conditions or injury. You may have a migraine, which is caused by changes in the blood vessels in your head.  At this time your provider does not find that your headache is a sign of anything dangerous or life-threatening.  However, sometimes the signs of serious illness do not show up right away.      Generally, every Emergency Department visit should have a follow-up clinic visit with either a primary or a specialty clinic/provider. Please follow-up as instructed by your emergency provider today.    Return to the Emergency Department if:  You get a new fever of 100.4 F or higher.  Your headache gets much worse.  You get a stiff neck with your headache.  You get a new headache that is significantly different or worse than headaches you have had before.  You are vomiting (throwing up) and cannot keep food or water down.  You have blurry or double vision or other problems with your eyes.  You have a new weakness on one side of your body.  You have difficulty with balance which is new.  You or your family thinks you are confused.  You have a seizure.    What can I do to help myself?  Pain medications - You may take a pain medication such as Tylenol  (acetaminophen), Advil , Motrin  (ibuprofen) or Aleve  (naproxen).  Take a pain reliever as soon as you notice symptoms.  Starting medications as soon as you start to have symptoms may lessen the amount of pain you have.  Relaxing in a quiet, dark room may help.  Get enough sleep and eat meals regularly.  You may need to watch for certain foods or other things which may trigger your headaches.  Keeping a journal of your headaches and possible triggers may help you and your primary provider to identify things which you should avoid which  may be causing your headaches.  If you were given a prescription for medicine here today, be sure to read all of the information (including the package insert) that comes with your prescription.  This will include important information about the medicine, its side effects, and any warnings that you need to know about.  The pharmacist who fills the prescription can provide more information and answer questions you may have about the medicine.  If you have questions or concerns that the pharmacist cannot address, please call or return to the Emergency Department.   Remember that you can always come back to the Emergency Department if you are not able to see your regular provider in the amount of time listed above, if you get any new symptoms, or if there is anything that worries you.    Discharge Instructions  Vomiting    You have been seen today for vomiting (throwing up). This is usually caused by a virus, but some bacteria, parasites, medicines or other medical conditions can cause similar symptoms. At this time your provider does not find that your vomiting is a sign of anything dangerous or life-threatening. However, sometimes the signs of serious illness do not show up right away. If you have new or worse symptoms, you may need to be seen again in the Emergency Department or by your primary provider. Remember that serious problems like appendicitis can start as vomiting.    Generally, every Emergency Department visit should have a follow-up clinic visit with either a primary or a specialty clinic/provider. Please follow-up as instructed by your emergency provider today.    Return to the Emergency Department if:  You keep vomiting and you are not able to keep liquids down.   You feel you are getting dehydrated, such as being very thirsty, not urinating (peeing) at least every 8-12 hours, or feeling faint or lightheaded.   You develop a new fever, or your fever continues for more than 2 days.   You have abdominal  (belly pain) that seems worse than cramps, is in one spot, or is getting worse over time. Appendicitis usually causes pain in the right lower abdomen (to the right and below your belly button) so watch for pain in this location.  You have blood in your vomit or stools.   You feel very weak.  You are not starting to improve within 24 hours of your visit here.     What can I do to help myself?  The most important thing to do is to drink clear liquids. If you have been vomiting a lot, it is best to have only small, frequent sips of liquids. Drinking too much at once may cause more vomiting. If you are vomiting often, you must replace minerals, sodium and potassium lost with your illness. Pedialyte  is the best available rehydration liquid but some find that it doesn t taste good so sports drinks are an alterative. You can also drink clear liquids such as water, weak tea, apple juice, and 7-Up . Avoid acid liquids (orange), caffeine (coffee) or alcohol. Do not drink milk until you no longer have diarrhea (loose stools).   After liquids are staying down, you may start eating mild foods. Soda crackers, toast, plain noodles, gelatin, applesauce and bananas are good first choices. Avoid foods that have acid, are spicy, fatty or have a lot of fiber (such as meats, coarse grains, vegetables). You may start eating these foods again in about 3 days when you are better.   Sometimes treatment includes prescription medicine to prevent nausea (sick to your stomach) and vomiting. If your provider prescribes these for you, take them as directed.   Do not take ibuprofen, naproxen, or other nonsteroidal anti-inflammatory (NSAID) medicines without checking with your healthcare provider.     If you were given a prescription for medicine here today, be sure to read all of the information (including the package insert) that comes with your prescription.  This will include important information about the medicine, its side effects, and any  warnings that you need to know about.  The pharmacist who fills the prescription can provide more information and answer questions you may have about the medicine.  If you have questions or concerns that the pharmacist cannot address, please call or return to the Emergency Department.     Remember that you can always come back to the Emergency Department if you are not able to see your regular provider in the amount of time listed above, if you get any new symptoms, or if there is anything that worries you.

## 2022-04-21 ENCOUNTER — HOSPITAL ENCOUNTER (OUTPATIENT)
Facility: CLINIC | Age: 32
Discharge: HOME | End: 2022-04-21
Attending: OBSTETRICS & GYNECOLOGY | Admitting: OBSTETRICS & GYNECOLOGY
Payer: COMMERCIAL

## 2022-04-21 ENCOUNTER — HOSPITAL ENCOUNTER (EMERGENCY)
Facility: CLINIC | Age: 32
End: 2022-04-21
Payer: COMMERCIAL

## 2022-04-21 VITALS — DIASTOLIC BLOOD PRESSURE: 80 MMHG | TEMPERATURE: 97.7 F | SYSTOLIC BLOOD PRESSURE: 124 MMHG | RESPIRATION RATE: 18 BRPM

## 2022-04-21 PROBLEM — Z36.89 ENCOUNTER FOR TRIAGE IN PREGNANT PATIENT: Status: ACTIVE | Noted: 2019-10-12

## 2022-04-21 PROCEDURE — G0463 HOSPITAL OUTPT CLINIC VISIT: HCPCS

## 2022-04-21 PROCEDURE — 250N000009 HC RX 250: Performed by: OBSTETRICS & GYNECOLOGY

## 2022-04-21 PROCEDURE — 250N000013 HC RX MED GY IP 250 OP 250 PS 637: Performed by: OBSTETRICS & GYNECOLOGY

## 2022-04-21 RX ORDER — CALCIUM CARBONATE 500 MG/1
2 TABLET, CHEWABLE ORAL 2 TIMES DAILY
COMMUNITY

## 2022-04-21 RX ORDER — LIDOCAINE 40 MG/G
CREAM TOPICAL
Status: DISCONTINUED | OUTPATIENT
Start: 2022-04-21 | End: 2022-04-22 | Stop reason: HOSPADM

## 2022-04-21 RX ADMIN — ALUMINUM HYDROXIDE, MAGNESIUM HYDROXIDE, AND SIMETHICONE 30 ML: 200; 200; 20 SUSPENSION ORAL at 22:02

## 2022-04-22 NOTE — PROVIDER NOTIFICATION
22 2149   Provider Notification   Provider Name/Title Dr. Otoole   Method of Notification Phone   Request Evaluate - Remote   Notification Reason Patient Arrived     MD notified of patient arrival.  at 30&4 weeks, here for chest/epigastric pain. History of preeclampsia with previous pregnancies. Patient reports feeling what she thought was heartburn this morning that was resolved with tums, but came back and is not resolved. She describes it as sharp pain beginning in her chest that radiates to the right to her upper back. Denies contractions, LOF, vaginal bleeding, HA, swelling or blurry vision. BP WDL. Reflexes and clonus WDL. FHR moderate with accels and no contractions noted on toco. Orders received for GI cocktail and to reevaluate in 30 minutes. If patient feels relief, orders to discharge home. If no relief, will call MD for further orders. Once 20 minutes of FHR tracing obtained, MD is okay with patient being taken off the monitor.

## 2022-04-22 NOTE — PLAN OF CARE
Data: Patient assessed in the [facility] for epigastric pain.  Cervical exam not examined.  Membranes intact.  Contractions none.  Action:  Presumed adequate fetal oxygenation documented (see flow record). Discharge instructions reviewed.  Patient instructed to report change in fetal movement, vaginal leaking of fluid or bleeding, abdominal pain, or any concerns related to the pregnancy to her nurse/physician.    Response: Orders to discharge home per Moraima Otoole.  Patient verbalized understanding of education and verbalized agreement with plan. Discharged to home at 2237.

## 2022-04-22 NOTE — DISCHARGE INSTRUCTIONS
Discharge Instruction for Undelivered Patients      You were seen for:  epigastric pain  We Consulted: Dr. Otoole  You had (Test or Medicine): heartburn medicine     Diet:   Drink 8 to 12 glasses of liquids (milk, juice, water) every day.  You may eat meals and snacks.     Activity:  Call your doctor or nurse midwife if your baby is moving less than usual.     Call your provider if you notice:  Swelling in your face or increased swelling in your hands or legs.  Headaches that are not relieved by Tylenol (acetaminophen).  Changes in your vision (blurring: seeing spots or stars.)  Nausea (sick to your stomach) and vomiting (throwing up).   Weight gain of 5 pounds or more per week.  Heartburn that doesn't go away.  Signs of bladder infection: pain when you urinate (use the toilet), need to go more often and more urgently.  The bag of nathan (rupture of membranes) breaks, or you notice leaking in your underwear.  Bright red blood in your underwear.  Abdominal (lower belly) or stomach pain.  For first baby: Contractions (tightening) less than 5 minutes apart for one hour or more.  Second (plus) baby: Contractions (tightening) less than 10 minutes apart and getting stronger.  *If less than 34 weeks: Contractions (tightening) more than 6 times in one hour.  Increase or change in vaginal discharge (note the color and amount)      Follow-up:  As scheduled in the clinic

## 2022-04-22 NOTE — PLAN OF CARE
Data: [facility]: 2022  9:23 PM.  Reason for maternal/fetal assessment is epigastric/chest pain. Patient reports feeling intermittent sharp, stabbing pain in her chest/epigastric area that radiates towards her right side into her upper back that began this morning and has continued off and on throughout the day.  Patient is a .  Prenatal record reviewed. Pregnancy has been uncomplicated..  Gestational Age 30w4d. VSS. Fetal movement active. Patient denies uterine contractions, leaking of vaginal fluid/rupture of membranes, vaginal bleeding, abdominal pain, pelvic pressure, headache, visual disturbances, significant edema. Support person is not present.   Action: Verbal consent for EFM. Triage assessment completed. Bill of rights reviewed.  Response: Patient verbalized agreement with plan. Will contact Dr Moraima Otoole with update and for further orders.

## 2022-05-05 ENCOUNTER — HOSPITAL ENCOUNTER (OUTPATIENT)
Facility: CLINIC | Age: 32
Discharge: HOME | End: 2022-05-05
Attending: OBSTETRICS & GYNECOLOGY | Admitting: OBSTETRICS & GYNECOLOGY
Payer: COMMERCIAL

## 2022-05-05 ENCOUNTER — HOSPITAL ENCOUNTER (OUTPATIENT)
Facility: CLINIC | Age: 32
End: 2022-05-05
Admitting: OBSTETRICS & GYNECOLOGY
Payer: COMMERCIAL

## 2022-05-05 VITALS
RESPIRATION RATE: 16 BRPM | DIASTOLIC BLOOD PRESSURE: 72 MMHG | HEIGHT: 65 IN | BODY MASS INDEX: 33.32 KG/M2 | SYSTOLIC BLOOD PRESSURE: 112 MMHG | HEART RATE: 83 BPM | WEIGHT: 200 LBS | TEMPERATURE: 98.2 F

## 2022-05-05 LAB
ALBUMIN SERPL-MCNC: 2.5 G/DL (ref 3.4–5)
ALP SERPL-CCNC: 93 U/L (ref 40–150)
ALT SERPL W P-5'-P-CCNC: 15 U/L (ref 0–50)
ANION GAP SERPL CALCULATED.3IONS-SCNC: 5 MMOL/L (ref 3–14)
AST SERPL W P-5'-P-CCNC: 15 U/L (ref 0–45)
BILIRUB SERPL-MCNC: 0.1 MG/DL (ref 0.2–1.3)
BUN SERPL-MCNC: 6 MG/DL (ref 7–30)
CALCIUM SERPL-MCNC: 8.6 MG/DL (ref 8.5–10.1)
CHLORIDE BLD-SCNC: 109 MMOL/L (ref 94–109)
CO2 SERPL-SCNC: 24 MMOL/L (ref 20–32)
CREAT SERPL-MCNC: 0.33 MG/DL (ref 0.52–1.04)
CREAT UR-MCNC: 64 MG/DL
ERYTHROCYTE [DISTWIDTH] IN BLOOD BY AUTOMATED COUNT: 13.3 % (ref 10–15)
GFR SERPL CREATININE-BSD FRML MDRD: >90 ML/MIN/1.73M2
GLUCOSE BLD-MCNC: 95 MG/DL (ref 70–99)
HCT VFR BLD AUTO: 31.6 % (ref 35–47)
HGB BLD-MCNC: 10.6 G/DL (ref 11.7–15.7)
MCH RBC QN AUTO: 30.3 PG (ref 26.5–33)
MCHC RBC AUTO-ENTMCNC: 33.5 G/DL (ref 31.5–36.5)
MCV RBC AUTO: 90 FL (ref 78–100)
PLATELET # BLD AUTO: 173 10E3/UL (ref 150–450)
POTASSIUM BLD-SCNC: 3.8 MMOL/L (ref 3.4–5.3)
PROT SERPL-MCNC: 6.3 G/DL (ref 6.8–8.8)
PROT UR-MCNC: 0.14 G/L
PROT/CREAT 24H UR: 0.22 G/G CR (ref 0–0.2)
RBC # BLD AUTO: 3.5 10E6/UL (ref 3.8–5.2)
SODIUM SERPL-SCNC: 138 MMOL/L (ref 133–144)
URATE SERPL-MCNC: 2.6 MG/DL (ref 2.6–6)
WBC # BLD AUTO: 7.2 10E3/UL (ref 4–11)

## 2022-05-05 PROCEDURE — 36415 COLL VENOUS BLD VENIPUNCTURE: CPT | Performed by: OBSTETRICS & GYNECOLOGY

## 2022-05-05 PROCEDURE — 84156 ASSAY OF PROTEIN URINE: CPT | Performed by: OBSTETRICS & GYNECOLOGY

## 2022-05-05 PROCEDURE — 80053 COMPREHEN METABOLIC PANEL: CPT | Performed by: OBSTETRICS & GYNECOLOGY

## 2022-05-05 PROCEDURE — 85027 COMPLETE CBC AUTOMATED: CPT | Performed by: OBSTETRICS & GYNECOLOGY

## 2022-05-05 PROCEDURE — G0463 HOSPITAL OUTPT CLINIC VISIT: HCPCS

## 2022-05-05 PROCEDURE — 84550 ASSAY OF BLOOD/URIC ACID: CPT | Performed by: OBSTETRICS & GYNECOLOGY

## 2022-05-05 RX ORDER — LIDOCAINE 40 MG/G
CREAM TOPICAL
Status: DISCONTINUED | OUTPATIENT
Start: 2022-05-05 | End: 2022-05-05 | Stop reason: HOSPADM

## 2022-05-06 NOTE — PROVIDER NOTIFICATION
Data: Patient assessed in the [facility] for blurry vision.  Cervical exam not examined.  Membranes intact.  Contractions/uterine assessment none.  Action:  Presumed adequate fetal oxygenation documented (see flow record). Discharge instructions reviewed.  Patient instructed to report change in fetal movement, vaginal leaking of fluid or bleeding, abdominal pain, or any concerns related to the pregnancy to her nurse/physician.    Response: Orders to discharge home per .  Patient verbalized understanding of education and verbalized agreement with plan. Discharged to home at 1905.

## 2022-05-06 NOTE — DISCHARGE INSTRUCTIONS
Discharge Instruction for Undelivered Patients      You were seen for:  spots across your vision  We Consulted: Dr. Townsend  You had (Test or Medicine): fetal and uterine monitoring, vital sign assessment, lab work     Diet:   Drink 8 to 12 glasses of liquids (milk, juice, water) every day.  You may eat meals and snacks.     Activity:  Call your doctor or nurse midwife if your baby is moving less than usual.     Call your provider if you notice:  Swelling in your face or increased swelling in your hands or legs.  Headaches that are not relieved by Tylenol (acetaminophen).  Changes in your vision (blurring: seeing spots or stars.)  Nausea (sick to your stomach) and vomiting (throwing up).   Weight gain of 5 pounds or more per week.  Heartburn that doesn't go away.  Signs of bladder infection: pain when you urinate (use the toilet), need to go more often and more urgently.  The bag of natahn (rupture of membranes) breaks, or you notice leaking in your underwear.  Bright red blood in your underwear.  Abdominal (lower belly) or stomach pain.  For first baby: Contractions (tightening) less than 5 minutes apart for one hour or more.  Second (plus) baby: Contractions (tightening) less than 10 minutes apart and getting stronger.  *If less than 34 weeks: Contractions (tightening) more than 6 times in one hour.  Increase or change in vaginal discharge (note the color and amount)    Follow-up:  As scheduled in the clinic

## 2022-05-06 NOTE — PROVIDER NOTIFICATION
05/05/22 1903   Provider Notification   Provider Name/Title Dr. Townsend   Method of Notification Phone   Request Evaluate - Remote     Called MD to review Pre-E lab results, WNL. Bps 110-120s/70s-80s while in triage. Pt reports that blurry vision has resolved since arrival. Denies other s/sx of pre-e. FHTs cat 1 without ctxs.     TORB per MD discharge patient to home, review standard discharge instructions including reasons to return. Pt to follow up as scheduled in clinic.

## 2022-05-21 ENCOUNTER — HEALTH MAINTENANCE LETTER (OUTPATIENT)
Age: 32
End: 2022-05-21

## 2022-06-06 ENCOUNTER — HOSPITAL ENCOUNTER (OUTPATIENT)
Age: 32
End: 2022-06-06
Payer: COMMERCIAL

## 2022-06-11 ENCOUNTER — HOSPITAL ENCOUNTER (OUTPATIENT)
Facility: CLINIC | Age: 32
End: 2022-06-11
Admitting: OBSTETRICS & GYNECOLOGY
Payer: COMMERCIAL

## 2022-06-11 ENCOUNTER — HOSPITAL ENCOUNTER (OUTPATIENT)
Facility: CLINIC | Age: 32
Discharge: HOME | End: 2022-06-11
Attending: OBSTETRICS & GYNECOLOGY | Admitting: OBSTETRICS & GYNECOLOGY
Payer: COMMERCIAL

## 2022-06-11 VITALS
DIASTOLIC BLOOD PRESSURE: 55 MMHG | OXYGEN SATURATION: 97 % | WEIGHT: 208 LBS | TEMPERATURE: 98.4 F | HEART RATE: 99 BPM | BODY MASS INDEX: 34.66 KG/M2 | HEIGHT: 65 IN | RESPIRATION RATE: 25 BRPM | SYSTOLIC BLOOD PRESSURE: 98 MMHG

## 2022-06-11 LAB
ALBUMIN SERPL-MCNC: 2.6 G/DL (ref 3.4–5)
ALP SERPL-CCNC: 116 U/L (ref 40–150)
ALT SERPL W P-5'-P-CCNC: 14 U/L (ref 0–50)
ANION GAP SERPL CALCULATED.3IONS-SCNC: 8 MMOL/L (ref 3–14)
AST SERPL W P-5'-P-CCNC: 12 U/L (ref 0–45)
BILIRUB SERPL-MCNC: 0.2 MG/DL (ref 0.2–1.3)
BUN SERPL-MCNC: 7 MG/DL (ref 7–30)
CALCIUM SERPL-MCNC: 8.5 MG/DL (ref 8.5–10.1)
CHLORIDE BLD-SCNC: 108 MMOL/L (ref 94–109)
CO2 SERPL-SCNC: 23 MMOL/L (ref 20–32)
CREAT SERPL-MCNC: 0.38 MG/DL (ref 0.52–1.04)
CREAT UR-MCNC: 161 MG/DL
ERYTHROCYTE [DISTWIDTH] IN BLOOD BY AUTOMATED COUNT: 13.5 % (ref 10–15)
GFR SERPL CREATININE-BSD FRML MDRD: >90 ML/MIN/1.73M2
GLUCOSE BLD-MCNC: 129 MG/DL (ref 70–99)
HCT VFR BLD AUTO: 32.2 % (ref 35–47)
HGB BLD-MCNC: 10.4 G/DL (ref 11.7–15.7)
MCH RBC QN AUTO: 28.7 PG (ref 26.5–33)
MCHC RBC AUTO-ENTMCNC: 32.3 G/DL (ref 31.5–36.5)
MCV RBC AUTO: 89 FL (ref 78–100)
PLATELET # BLD AUTO: 196 10E3/UL (ref 150–450)
POTASSIUM BLD-SCNC: 3.8 MMOL/L (ref 3.4–5.3)
PROT SERPL-MCNC: 6.5 G/DL (ref 6.8–8.8)
PROT UR-MCNC: 0.31 G/L
PROT/CREAT 24H UR: 0.19 G/G CR (ref 0–0.2)
RBC # BLD AUTO: 3.62 10E6/UL (ref 3.8–5.2)
SODIUM SERPL-SCNC: 139 MMOL/L (ref 133–144)
WBC # BLD AUTO: 6.3 10E3/UL (ref 4–11)

## 2022-06-11 PROCEDURE — 80053 COMPREHEN METABOLIC PANEL: CPT | Performed by: OBSTETRICS & GYNECOLOGY

## 2022-06-11 PROCEDURE — G0463 HOSPITAL OUTPT CLINIC VISIT: HCPCS

## 2022-06-11 PROCEDURE — 36415 COLL VENOUS BLD VENIPUNCTURE: CPT | Performed by: OBSTETRICS & GYNECOLOGY

## 2022-06-11 PROCEDURE — 250N000013 HC RX MED GY IP 250 OP 250 PS 637: Performed by: OBSTETRICS & GYNECOLOGY

## 2022-06-11 PROCEDURE — 84156 ASSAY OF PROTEIN URINE: CPT | Performed by: OBSTETRICS & GYNECOLOGY

## 2022-06-11 PROCEDURE — 85027 COMPLETE CBC AUTOMATED: CPT | Performed by: OBSTETRICS & GYNECOLOGY

## 2022-06-11 RX ORDER — ACETAMINOPHEN 325 MG/1
975 TABLET ORAL ONCE
Status: COMPLETED | OUTPATIENT
Start: 2022-06-11 | End: 2022-06-11

## 2022-06-11 RX ADMIN — ACETAMINOPHEN 975 MG: 325 TABLET ORAL at 16:40

## 2022-06-11 NOTE — PLAN OF CARE
Data: Patient presented to [facility]: 2022  2:46 PM.  Reason for maternal/fetal assessment is R/O preeclampsia . Patient reports Headache since yesterday, took Tylenol at 09 and 12 today, denies any relief. Admits to seeing flashing lights off and on the past 3 weeks and has had some SOB. Did have preeclampsia with first child. Patient is a .  Prenatal record reviewed. Pregnancy  has been uncomplicated.   Gestational Age 37w6d. VSS. Fetal movement active. Patient denies nausea, vomiting, epigastric or URQ pain or significant edema. Support person is present.   Action: Verbal consent for EFM. Triage assessment completed. Bill of rights reviewed.  Response: Patient verbalized agreement with plan.  Orders received from Dr Claudia Woods.

## 2022-06-11 NOTE — PLAN OF CARE
Data: Patient assessed in the [facility] for HA and visual disturbances.  Cervical exam not examined.  Membranes intact.  Contractions/uterine assessment none.  Action:  Presumed adequate fetal oxygenation documented (see flow record). Discharge instructions reviewed. Patient instructed to report change in fetal movement, vaginal leaking of fluid or bleeding, abdominal pain, or any concerns related to the pregnancy to her nurse/physician.    Response: Orders to discharge home per .  Patient verbalized understanding of education and verbalized agreement with plan. Pt chose to take 975mg tylenol instead of fioricet. Discharged to home at 1645.

## 2022-06-11 NOTE — PROVIDER NOTIFICATION
22 8770   Provider Notification   Provider Name/Title Dr. Woods   Method of Notification Phone   Request Evaluate - Remote   Notification Reason Patient Arrived;Status Update;Lab/Diagnostic Study   MD updated on pt's arrival, reviewed pt's OB hx - , 37.6, gbs negative. Hx of gHTN and endometritis 4 days PP with 1st pregnancy 4 years ago, covid in 3rd tri of this preg, gerd and class I obesity otherwise uncomplicated pregnancy.     Pt arrived with complaints of flashing lights in vision, and new onset HA that started yesterday unresolved by 500mg tylenol x2, rating 6/10 pain and some SOB. Pt states flashing lights are not new, also writer noted in prenatal pt has complained of HA at previous apts. Pt was evaluated in L&D on  for pre-e specifically complaining of flashing lights at that time, BP and labs WDL and pt was sent home. BP's upon arrival today 120's/70's-80's, other vss ex: RR 25, sat's 100% all lungs sounds clear. Pt states SOB is not new, feels it is directly r/t to baby pushing up on her ribs. Trace edema, reflexes +1, no clonus, pt denies RUQ pain. FHR Cat 1, pt is not ctx, abdomen soft, some irritability noted on toco.    Reviewed results of CBC/CMP and prot/creat urine - WDL. Orders to offer pt fioricet PO x2 tabs or 975mg tylenol PO and discharge pt to home. Instruct pt to follow up in clinic as scheduled.

## 2022-06-11 NOTE — DISCHARGE INSTRUCTIONS
Discharge Instruction for Undelivered Patients      You were seen for:  Pre-Eclampsia Evaluation  We Consulted: Dr. Claudia Woods  You had (Test or Medicine): Electronic fetal monitoring, blood work, protein/urine analysis      Diet:   Drink 8 to 12 glasses of liquids (milk, juice, water) every day.  You may eat meals and snacks.     Activity:  Call your doctor or nurse midwife if your baby is moving less than usual.     Call your provider if you notice:  Swelling in your face or increased swelling in your hands or legs.  Headaches that are not relieved by Tylenol (acetaminophen).  Changes in your vision (blurring: seeing spots or stars.)  Nausea (sick to your stomach) and vomiting (throwing up).   Weight gain of 5 pounds or more per week.  Heartburn that doesn't go away.  Signs of bladder infection: pain when you urinate (use the toilet), need to go more often and more urgently.  The bag of nathan (rupture of membranes) breaks, or you notice leaking in your underwear.  Bright red blood in your underwear.  Abdominal (lower belly) or stomach pain.  Second (plus) baby: Contractions (tightening) less than 10 minutes apart and getting stronger.    Follow-up:  As scheduled in the clinic

## 2022-06-29 DIAGNOSIS — Z34.90 TERM PREGNANCY: Primary | ICD-10-CM

## 2022-06-30 ENCOUNTER — HOSPITAL ENCOUNTER (INPATIENT)
Facility: CLINIC | Age: 32
LOS: 2 days | Discharge: HOME | End: 2022-07-02
Attending: OBSTETRICS & GYNECOLOGY | Admitting: OBSTETRICS & GYNECOLOGY
Payer: COMMERCIAL

## 2022-06-30 DIAGNOSIS — D64.9 ANEMIA, UNSPECIFIED TYPE: ICD-10-CM

## 2022-06-30 LAB
ABO/RH(D): NORMAL
ANTIBODY SCREEN: NEGATIVE
HOLD SPECIMEN: NORMAL
SARS-COV-2 RNA RESP QL NAA+PROBE: NEGATIVE
SPECIMEN EXPIRATION DATE: NORMAL

## 2022-06-30 PROCEDURE — 120N000001 HC R&B MED SURG/OB

## 2022-06-30 PROCEDURE — 250N000011 HC RX IP 250 OP 636: Performed by: OBSTETRICS & GYNECOLOGY

## 2022-06-30 PROCEDURE — U0005 INFEC AGEN DETEC AMPLI PROBE: HCPCS | Performed by: OBSTETRICS & GYNECOLOGY

## 2022-06-30 PROCEDURE — 722N000001 HC LABOR CARE VAGINAL DELIVERY SINGLE

## 2022-06-30 PROCEDURE — 86780 TREPONEMA PALLIDUM: CPT | Performed by: OBSTETRICS & GYNECOLOGY

## 2022-06-30 PROCEDURE — 86901 BLOOD TYPING SEROLOGIC RH(D): CPT | Performed by: OBSTETRICS & GYNECOLOGY

## 2022-06-30 PROCEDURE — 86850 RBC ANTIBODY SCREEN: CPT | Performed by: OBSTETRICS & GYNECOLOGY

## 2022-06-30 PROCEDURE — 250N000013 HC RX MED GY IP 250 OP 250 PS 637: Performed by: OBSTETRICS & GYNECOLOGY

## 2022-06-30 PROCEDURE — 10D17ZZ EXTRACTION OF PRODUCTS OF CONCEPTION, RETAINED, VIA NATURAL OR ARTIFICIAL OPENING: ICD-10-PCS | Performed by: OBSTETRICS & GYNECOLOGY

## 2022-06-30 PROCEDURE — 0KQM0ZZ REPAIR PERINEUM MUSCLE, OPEN APPROACH: ICD-10-PCS | Performed by: OBSTETRICS & GYNECOLOGY

## 2022-06-30 PROCEDURE — 250N000009 HC RX 250: Performed by: OBSTETRICS & GYNECOLOGY

## 2022-06-30 RX ORDER — PROCHLORPERAZINE 25 MG
25 SUPPOSITORY, RECTAL RECTAL EVERY 12 HOURS PRN
Status: DISCONTINUED | OUTPATIENT
Start: 2022-06-30 | End: 2022-06-30 | Stop reason: HOSPADM

## 2022-06-30 RX ORDER — IBUPROFEN 800 MG/1
800 TABLET, FILM COATED ORAL EVERY 6 HOURS PRN
Status: DISCONTINUED | OUTPATIENT
Start: 2022-06-30 | End: 2022-07-02 | Stop reason: HOSPADM

## 2022-06-30 RX ORDER — OXYTOCIN 10 [USP'U]/ML
10 INJECTION, SOLUTION INTRAMUSCULAR; INTRAVENOUS
Status: DISCONTINUED | OUTPATIENT
Start: 2022-06-30 | End: 2022-06-30 | Stop reason: HOSPADM

## 2022-06-30 RX ORDER — ACETAMINOPHEN 325 MG/1
650 TABLET ORAL EVERY 4 HOURS PRN
Status: DISCONTINUED | OUTPATIENT
Start: 2022-06-30 | End: 2022-06-30 | Stop reason: HOSPADM

## 2022-06-30 RX ORDER — NALOXONE HYDROCHLORIDE 0.4 MG/ML
0.2 INJECTION, SOLUTION INTRAMUSCULAR; INTRAVENOUS; SUBCUTANEOUS
Status: DISCONTINUED | OUTPATIENT
Start: 2022-06-30 | End: 2022-06-30 | Stop reason: HOSPADM

## 2022-06-30 RX ORDER — ACETAMINOPHEN 325 MG/1
650 TABLET ORAL EVERY 4 HOURS PRN
Status: DISCONTINUED | OUTPATIENT
Start: 2022-06-30 | End: 2022-07-02 | Stop reason: HOSPADM

## 2022-06-30 RX ORDER — METOCLOPRAMIDE 10 MG/1
10 TABLET ORAL EVERY 6 HOURS PRN
Status: DISCONTINUED | OUTPATIENT
Start: 2022-06-30 | End: 2022-06-30 | Stop reason: HOSPADM

## 2022-06-30 RX ORDER — BISACODYL 10 MG
10 SUPPOSITORY, RECTAL RECTAL DAILY PRN
Status: DISCONTINUED | OUTPATIENT
Start: 2022-06-30 | End: 2022-07-02 | Stop reason: HOSPADM

## 2022-06-30 RX ORDER — MISOPROSTOL 200 UG/1
400 TABLET ORAL
Status: DISCONTINUED | OUTPATIENT
Start: 2022-06-30 | End: 2022-06-30 | Stop reason: HOSPADM

## 2022-06-30 RX ORDER — CARBOPROST TROMETHAMINE 250 UG/ML
250 INJECTION, SOLUTION INTRAMUSCULAR
Status: DISCONTINUED | OUTPATIENT
Start: 2022-06-30 | End: 2022-07-02 | Stop reason: HOSPADM

## 2022-06-30 RX ORDER — OXYTOCIN/0.9 % SODIUM CHLORIDE 30/500 ML
1-24 PLASTIC BAG, INJECTION (ML) INTRAVENOUS CONTINUOUS
Status: DISCONTINUED | OUTPATIENT
Start: 2022-06-30 | End: 2022-06-30 | Stop reason: HOSPADM

## 2022-06-30 RX ORDER — LIDOCAINE 40 MG/G
CREAM TOPICAL
Status: DISCONTINUED | OUTPATIENT
Start: 2022-06-30 | End: 2022-06-30

## 2022-06-30 RX ORDER — MISOPROSTOL 200 UG/1
800 TABLET ORAL
Status: DISCONTINUED | OUTPATIENT
Start: 2022-06-30 | End: 2022-07-02 | Stop reason: HOSPADM

## 2022-06-30 RX ORDER — SODIUM CHLORIDE, SODIUM LACTATE, POTASSIUM CHLORIDE, CALCIUM CHLORIDE 600; 310; 30; 20 MG/100ML; MG/100ML; MG/100ML; MG/100ML
INJECTION, SOLUTION INTRAVENOUS CONTINUOUS PRN
Status: DISCONTINUED | OUTPATIENT
Start: 2022-06-30 | End: 2022-06-30 | Stop reason: HOSPADM

## 2022-06-30 RX ORDER — NALOXONE HYDROCHLORIDE 0.4 MG/ML
0.4 INJECTION, SOLUTION INTRAMUSCULAR; INTRAVENOUS; SUBCUTANEOUS
Status: DISCONTINUED | OUTPATIENT
Start: 2022-06-30 | End: 2022-06-30 | Stop reason: HOSPADM

## 2022-06-30 RX ORDER — MISOPROSTOL 200 UG/1
400 TABLET ORAL
Status: DISCONTINUED | OUTPATIENT
Start: 2022-06-30 | End: 2022-07-02 | Stop reason: HOSPADM

## 2022-06-30 RX ORDER — SODIUM CHLORIDE, SODIUM LACTATE, POTASSIUM CHLORIDE, CALCIUM CHLORIDE 600; 310; 30; 20 MG/100ML; MG/100ML; MG/100ML; MG/100ML
INJECTION, SOLUTION INTRAVENOUS CONTINUOUS
Status: DISCONTINUED | OUTPATIENT
Start: 2022-06-30 | End: 2022-06-30 | Stop reason: HOSPADM

## 2022-06-30 RX ORDER — KETOROLAC TROMETHAMINE 30 MG/ML
30 INJECTION, SOLUTION INTRAMUSCULAR; INTRAVENOUS
Status: DISCONTINUED | OUTPATIENT
Start: 2022-06-30 | End: 2022-06-30

## 2022-06-30 RX ORDER — CITRIC ACID/SODIUM CITRATE 334-500MG
30 SOLUTION, ORAL ORAL
Status: DISCONTINUED | OUTPATIENT
Start: 2022-06-30 | End: 2022-06-30 | Stop reason: HOSPADM

## 2022-06-30 RX ORDER — OXYTOCIN 10 [USP'U]/ML
10 INJECTION, SOLUTION INTRAMUSCULAR; INTRAVENOUS
Status: DISCONTINUED | OUTPATIENT
Start: 2022-06-30 | End: 2022-06-30

## 2022-06-30 RX ORDER — MISOPROSTOL 200 UG/1
800 TABLET ORAL
Status: DISCONTINUED | OUTPATIENT
Start: 2022-06-30 | End: 2022-06-30 | Stop reason: HOSPADM

## 2022-06-30 RX ORDER — METOCLOPRAMIDE HYDROCHLORIDE 5 MG/ML
10 INJECTION INTRAMUSCULAR; INTRAVENOUS EVERY 6 HOURS PRN
Status: DISCONTINUED | OUTPATIENT
Start: 2022-06-30 | End: 2022-06-30 | Stop reason: HOSPADM

## 2022-06-30 RX ORDER — METHYLERGONOVINE MALEATE 0.2 MG/ML
200 INJECTION INTRAVENOUS
Status: DISCONTINUED | OUTPATIENT
Start: 2022-06-30 | End: 2022-06-30 | Stop reason: HOSPADM

## 2022-06-30 RX ORDER — CARBOPROST TROMETHAMINE 250 UG/ML
250 INJECTION, SOLUTION INTRAMUSCULAR
Status: DISCONTINUED | OUTPATIENT
Start: 2022-06-30 | End: 2022-06-30 | Stop reason: HOSPADM

## 2022-06-30 RX ORDER — TRANEXAMIC ACID 10 MG/ML
1 INJECTION, SOLUTION INTRAVENOUS EVERY 30 MIN PRN
Status: DISCONTINUED | OUTPATIENT
Start: 2022-06-30 | End: 2022-06-30 | Stop reason: HOSPADM

## 2022-06-30 RX ORDER — OXYTOCIN/0.9 % SODIUM CHLORIDE 30/500 ML
340 PLASTIC BAG, INJECTION (ML) INTRAVENOUS CONTINUOUS PRN
Status: DISCONTINUED | OUTPATIENT
Start: 2022-06-30 | End: 2022-07-02 | Stop reason: HOSPADM

## 2022-06-30 RX ORDER — ONDANSETRON 2 MG/ML
4 INJECTION INTRAMUSCULAR; INTRAVENOUS EVERY 6 HOURS PRN
Status: DISCONTINUED | OUTPATIENT
Start: 2022-06-30 | End: 2022-06-30 | Stop reason: HOSPADM

## 2022-06-30 RX ORDER — LIDOCAINE 40 MG/G
CREAM TOPICAL
Status: DISCONTINUED | OUTPATIENT
Start: 2022-06-30 | End: 2022-06-30 | Stop reason: HOSPADM

## 2022-06-30 RX ORDER — TRANEXAMIC ACID 10 MG/ML
1 INJECTION, SOLUTION INTRAVENOUS EVERY 30 MIN PRN
Status: DISCONTINUED | OUTPATIENT
Start: 2022-06-30 | End: 2022-07-02 | Stop reason: HOSPADM

## 2022-06-30 RX ORDER — OXYTOCIN 10 [USP'U]/ML
10 INJECTION, SOLUTION INTRAMUSCULAR; INTRAVENOUS
Status: DISCONTINUED | OUTPATIENT
Start: 2022-06-30 | End: 2022-07-02 | Stop reason: HOSPADM

## 2022-06-30 RX ORDER — PROCHLORPERAZINE MALEATE 10 MG
10 TABLET ORAL EVERY 6 HOURS PRN
Status: DISCONTINUED | OUTPATIENT
Start: 2022-06-30 | End: 2022-06-30 | Stop reason: HOSPADM

## 2022-06-30 RX ORDER — HYDROCORTISONE 25 MG/G
CREAM TOPICAL 3 TIMES DAILY PRN
Status: DISCONTINUED | OUTPATIENT
Start: 2022-06-30 | End: 2022-07-02 | Stop reason: HOSPADM

## 2022-06-30 RX ORDER — METHYLERGONOVINE MALEATE 0.2 MG/ML
200 INJECTION INTRAVENOUS
Status: DISCONTINUED | OUTPATIENT
Start: 2022-06-30 | End: 2022-07-02 | Stop reason: HOSPADM

## 2022-06-30 RX ORDER — MODIFIED LANOLIN
OINTMENT (GRAM) TOPICAL
Status: DISCONTINUED | OUTPATIENT
Start: 2022-06-30 | End: 2022-07-02 | Stop reason: HOSPADM

## 2022-06-30 RX ORDER — IBUPROFEN 800 MG/1
800 TABLET, FILM COATED ORAL
Status: DISCONTINUED | OUTPATIENT
Start: 2022-06-30 | End: 2022-06-30

## 2022-06-30 RX ORDER — OXYTOCIN/0.9 % SODIUM CHLORIDE 30/500 ML
100-340 PLASTIC BAG, INJECTION (ML) INTRAVENOUS CONTINUOUS PRN
Status: DISCONTINUED | OUTPATIENT
Start: 2022-06-30 | End: 2022-06-30

## 2022-06-30 RX ORDER — DOCUSATE SODIUM 100 MG/1
100 CAPSULE, LIQUID FILLED ORAL DAILY
Status: DISCONTINUED | OUTPATIENT
Start: 2022-07-01 | End: 2022-07-02 | Stop reason: HOSPADM

## 2022-06-30 RX ORDER — FENTANYL CITRATE 50 UG/ML
50 INJECTION, SOLUTION INTRAMUSCULAR; INTRAVENOUS EVERY 30 MIN PRN
Status: DISCONTINUED | OUTPATIENT
Start: 2022-06-30 | End: 2022-06-30 | Stop reason: HOSPADM

## 2022-06-30 RX ORDER — OXYTOCIN/0.9 % SODIUM CHLORIDE 30/500 ML
340 PLASTIC BAG, INJECTION (ML) INTRAVENOUS CONTINUOUS PRN
Status: DISCONTINUED | OUTPATIENT
Start: 2022-06-30 | End: 2022-06-30 | Stop reason: HOSPADM

## 2022-06-30 RX ORDER — ONDANSETRON 4 MG/1
4 TABLET, ORALLY DISINTEGRATING ORAL EVERY 6 HOURS PRN
Status: DISCONTINUED | OUTPATIENT
Start: 2022-06-30 | End: 2022-06-30 | Stop reason: HOSPADM

## 2022-06-30 RX ADMIN — KETOROLAC TROMETHAMINE 30 MG: 30 INJECTION, SOLUTION INTRAMUSCULAR at 22:50

## 2022-06-30 RX ADMIN — LIDOCAINE HYDROCHLORIDE 10 ML: 10 INJECTION, SOLUTION EPIDURAL; INFILTRATION; INTRACAUDAL; PERINEURAL at 22:35

## 2022-06-30 RX ADMIN — FENTANYL CITRATE 50 MCG: 50 INJECTION, SOLUTION INTRAMUSCULAR; INTRAVENOUS at 22:32

## 2022-06-30 RX ADMIN — MISOPROSTOL 400 MCG: 200 TABLET ORAL at 22:39

## 2022-06-30 RX ADMIN — Medication 2 MILLI-UNITS/MIN: at 22:17

## 2022-06-30 ASSESSMENT — ACTIVITIES OF DAILY LIVING (ADL)
ADLS_ACUITY_SCORE: 35
ADLS_ACUITY_SCORE: 35
DIFFICULTY_EATING/SWALLOWING: NO
FALL_HISTORY_WITHIN_LAST_SIX_MONTHS: NO
CONCENTRATING,_REMEMBERING_OR_MAKING_DECISIONS_DIFFICULTY: NO
WEAR_GLASSES_OR_BLIND: NO
DRESSING/BATHING_DIFFICULTY: NO
TOILETING_ISSUES: NO
CHANGE_IN_FUNCTIONAL_STATUS_SINCE_ONSET_OF_CURRENT_ILLNESS/INJURY: NO
DOING_ERRANDS_INDEPENDENTLY_DIFFICULTY: NO
WALKING_OR_CLIMBING_STAIRS_DIFFICULTY: NO

## 2022-07-01 ENCOUNTER — LAB (OUTPATIENT)
Dept: LAB | Facility: CLINIC | Age: 32
End: 2022-07-01
Attending: OBSTETRICS & GYNECOLOGY
Payer: COMMERCIAL

## 2022-07-01 DIAGNOSIS — Z34.90 TERM PREGNANCY: ICD-10-CM

## 2022-07-01 LAB
HGB BLD-MCNC: 9.1 G/DL (ref 11.7–15.7)
T PALLIDUM AB SER QL: NONREACTIVE

## 2022-07-01 PROCEDURE — 250N000013 HC RX MED GY IP 250 OP 250 PS 637: Performed by: OBSTETRICS & GYNECOLOGY

## 2022-07-01 PROCEDURE — 120N000001 HC R&B MED SURG/OB

## 2022-07-01 PROCEDURE — 36415 COLL VENOUS BLD VENIPUNCTURE: CPT | Performed by: OBSTETRICS & GYNECOLOGY

## 2022-07-01 PROCEDURE — 999N000079 HC STATISTIC IP LACTATION SERVICES 1-15 MIN

## 2022-07-01 PROCEDURE — 85018 HEMOGLOBIN: CPT | Performed by: OBSTETRICS & GYNECOLOGY

## 2022-07-01 RX ORDER — DOCUSATE SODIUM 100 MG/1
100 CAPSULE, LIQUID FILLED ORAL DAILY
Qty: 30 CAPSULE | Refills: 0 | Status: SHIPPED | OUTPATIENT
Start: 2022-07-01

## 2022-07-01 RX ORDER — BISACODYL 10 MG
10 SUPPOSITORY, RECTAL RECTAL DAILY PRN
COMMUNITY
Start: 2022-07-01

## 2022-07-01 RX ORDER — FAMOTIDINE 20 MG/1
20 TABLET, FILM COATED ORAL DAILY
Status: DISCONTINUED | OUTPATIENT
Start: 2022-07-01 | End: 2022-07-02 | Stop reason: HOSPADM

## 2022-07-01 RX ORDER — ACETAMINOPHEN 325 MG/1
650 TABLET ORAL EVERY 4 HOURS PRN
COMMUNITY
Start: 2022-07-01

## 2022-07-01 RX ORDER — IBUPROFEN 800 MG/1
800 TABLET, FILM COATED ORAL EVERY 6 HOURS PRN
Qty: 30 TABLET | Refills: 0 | Status: SHIPPED | OUTPATIENT
Start: 2022-07-01

## 2022-07-01 RX ADMIN — ACETAMINOPHEN 650 MG: 325 TABLET, FILM COATED ORAL at 07:51

## 2022-07-01 RX ADMIN — ACETAMINOPHEN 650 MG: 325 TABLET, FILM COATED ORAL at 18:02

## 2022-07-01 RX ADMIN — IBUPROFEN 800 MG: 800 TABLET, FILM COATED ORAL at 05:16

## 2022-07-01 RX ADMIN — IBUPROFEN 800 MG: 800 TABLET, FILM COATED ORAL at 11:11

## 2022-07-01 RX ADMIN — ACETAMINOPHEN 650 MG: 325 TABLET, FILM COATED ORAL at 22:00

## 2022-07-01 RX ADMIN — FAMOTIDINE 20 MG: 20 TABLET ORAL at 16:04

## 2022-07-01 RX ADMIN — DOCUSATE SODIUM 100 MG: 100 CAPSULE, LIQUID FILLED ORAL at 07:51

## 2022-07-01 RX ADMIN — ACETAMINOPHEN 650 MG: 325 TABLET, FILM COATED ORAL at 13:49

## 2022-07-01 RX ADMIN — IBUPROFEN 800 MG: 800 TABLET, FILM COATED ORAL at 18:02

## 2022-07-01 RX ADMIN — Medication 1 TABLET: at 11:11

## 2022-07-01 RX ADMIN — ACETAMINOPHEN 650 MG: 325 TABLET, FILM COATED ORAL at 02:54

## 2022-07-01 ASSESSMENT — ACTIVITIES OF DAILY LIVING (ADL)
ADLS_ACUITY_SCORE: 18

## 2022-07-01 NOTE — PLAN OF CARE
Instructed to fill out birth certificate and depression scale  & watch videos .  Mom is independent with self/baby care . FOB here, supportive . Instructed to call if need pain meds.

## 2022-07-01 NOTE — PROVIDER NOTIFICATION
07/01/22 155   Provider Notification   Provider Name/Title Dr. Winn   Method of Notification Phone   Notification Reason Medication Request   Pt c/o heartburn and requesting Pepcid  and tel order given by Dr. Winn See Mar.

## 2022-07-01 NOTE — CARE PLAN
Data: Haritha Bateman transferred to 442 via wheelchair at 0115. Baby transferred via parent's arms.  Action: Receiving unit notified of transfer: Yes. Patient and family notified of room change. Report given to CATHY Mccray at 0130. Belongings sent to receiving unit. Accompanied by Registered Nurse. Oriented patient to surroundings. Call light within reach. ID bands double-checked with receiving RN.  Response: Patient tolerated transfer and is stable.

## 2022-07-01 NOTE — PLAN OF CARE
Assumed care [id number]. Bonding well with baby. Ambulating independently, voiding without difficulty. Denies headache, dizziness, SOB, chest pain, and changes in vision. Ibuprofen and tylenol for pain management. VSS. Report given to CATHY Maher who assumes care.

## 2022-07-01 NOTE — PROVIDER NOTIFICATION
06/30/22 2202   Provider Notification   Provider Name/Title Dr. Baker   Method of Notification At Bedside   Request Evaluate in Person     MD at bedside, RANOLD 8/90/-1, VORB to start pitocin at 2, patient informed and consents, during pushing forebag ruptured, MD remains at bedside through delivery.

## 2022-07-01 NOTE — PROVIDER NOTIFICATION
06/30/22 2107   Provider Notification   Provider Name/Title Dr. Baker   Method of Notification Phone   Request Evaluate - Remote   Notification Reason Status Update;ARNOLD STANLEY called for update, NASLIZA 4-5/80/-1, starting to feel some rectal pressure with contractions, is going to try nitrous for pain, she is bennie every 1.5-2 min, FHT category 1.    MD will head in, plans to be here in about 45 min, call if needed sooner.

## 2022-07-01 NOTE — PROGRESS NOTES
Public Health Nurse (PHN) in to visit patient to discuss [facility] resources. Patient interested in family home visiting referral. PHN placed referral electronically.

## 2022-07-01 NOTE — PROGRESS NOTES
Patient Name:  Haritha Bateman   MRN:  [id number]  Age:  32 year old    YOB: 1990      POSTPARTUM PROGRESS NOTE    Pt is PPD#1 s/p vaginal delivery.  She is doing well without complaints.  Pt is ambulating, voiding, tolerating a regular diet.  Pain is well controlled and lochia is within normal limits.  She is breast and bottle feeding.  Baby is doing well.    She reports palpitations when getting up from bed.  Denies dizziness or lightheadedness.    Objective:    Temp:  [97.7  F (36.5  C)-99.1  F (37.3  C)] 98.5  F (36.9  C)  Pulse:  [60-87] 87  Resp:  [16] 16  BP: (108-137)/(58-77) 115/73  SpO2:  [100 %] 100 %  0 lbs 0 oz    General Appearance:  NAD  Lungs:  unlabored  Cardiovascular:  RRR  Abdomen:  nontender, nondistended  Fundus:  firm, below the umbilicus      Lower extremities:  trace symmetric edema    Lab Review:    ABO/RH(D)   Date Value Ref Range Status   06/30/2022 B POS  Final     Hemoglobin   Date Value Ref Range Status   07/01/2022 9.1 (L) 11.7 - 15.7 g/dL Final   06/11/2022 10.4 (L) 11.7 - 15.7 g/dL Final   05/05/2022 10.6 (L) 11.7 - 15.7 g/dL Final   02/27/2018 10.8 (L) 11.7 - 15.7 g/dL Final   02/26/2018 12.2 11.7 - 15.7 g/dL Final   02/24/2018 11.7 11.7 - 15.7 g/dL Final     Hematocrit   Date Value Ref Range Status   06/11/2022 32.2 (L) 35.0 - 47.0 % Final   05/05/2022 31.6 (L) 35.0 - 47.0 % Final   12/14/2021 40.4 35.0 - 47.0 % Final   02/27/2018 31.8 (L) 35.0 - 47.0 % Final   02/26/2018 36.0 35.0 - 47.0 % Final   02/24/2018 34.2 (L) 35.0 - 47.0 % Final       Lab Results   Component Value Date    WBC 6.3 06/11/2022    WBC 7.3 02/27/2018     Lab Results   Component Value Date    RBC 3.62 06/11/2022    RBC 3.45 02/27/2018     Lab Results   Component Value Date    HGB 9.1 07/01/2022    HGB 10.8 02/27/2018     Lab Results   Component Value Date    HCT 32.2 06/11/2022    HCT 31.8 02/27/2018     No components found for: MCT  Lab Results   Component Value Date    MCV 89 06/11/2022    MCV 92  2018     Lab Results   Component Value Date    MCH 28.7 2022    MCH 31.3 2018     Lab Results   Component Value Date    MCHC 32.3 2022    MCHC 34.0 2018     Lab Results   Component Value Date    RDW 13.5 2022    RDW 15.6 2018     Lab Results   Component Value Date     2022     2018       Assessment:  31yo  PPD#1 s/p vaginal delivery, doing well.    Plan:   - Postpartum: recovering well. Pain well controlled. Cont PO pain meds and regular diet. Encourage ambulation.  - Acute on chronic anemia: PO iron started.  Encouraged PO hydration.  Recommended slowly getting out of bed.  No indication for transfusion at this time.   - h/o GHTN in previous preg: Bps normal with no s/sx of preE  - Contraception: Nexplanon  - Dispo: anticipate DC PPD#2      Meredith Chan, MD [facility]  2022

## 2022-07-01 NOTE — LACTATION NOTE
Lactation visit. Baby cueing after bath. frenulum tight. Mothers nipples tender. Baby latched deep on to breast with flanged lips, and is rhythmically sucking. Good positioning noted. Reviewed importance of breastfeeding frequently and to nurse first before supplementing with formula. All questions answered. Encouraged to call for assistance prn.

## 2022-07-01 NOTE — PLAN OF CARE
Data: Vital signs within normal limits. Postpartum checks within normal limits, see flow record. Patient eating and drinking normally. Patient able to empty bladder independently and is up ambulating. No apparent signs of infection.  2nd degree laceration is tender  but using ice, tucks and Tylenol and Ibuprofen with relief. Patient performing self cares and is able to care for infant.  Action: Patient medicated during the shift for pain and cramping. See MAR. Patient reassessed within 1 hour after each medication and pain was improved  or patient sleeping. Patient education done about safe infant sleep, basic infant cares, bulb syringe usage, breast and bottle feeding with formula per mother's request. See flow record.  Response: Positive attachment behaviors observed with infant. Support person present.   Plan: Anticipate discharge on 0702/22.

## 2022-07-01 NOTE — DISCHARGE SUMMARY
[facility] Discharge Summary    Haritha Bateman MRN# [id number]   Age: 32 year old YOB: 1990     Date of Admission:  2022  Date of Discharge::  22  Admitting Physician:  Mireille Shi MD  Discharge Physician:  Shane Townsend MD          Admission Diagnoses:   -IUP at 40w4d  -COVID preg, recovered            Discharge Diagnosis:     -IUP at 40w4d, now delivered  -ABLA with chronic anemia  -Hx gHTN  -Hx pp endometritis and retained POC  -Class 1 Obesity  -GERD  -Constipation         Procedures:     Procedure(s): -            Medications Prior to Admission:     Medications Prior to Admission   Medication Sig Dispense Refill Last Dose     calcium carbonate (TUMS) 500 MG chewable tablet Take 2 chew tab by mouth 2 times daily        Prenatal Vit-Fe Fumarate-FA (PRENATAL PLUS) 27-1 MG TABS Take 1 tablet by mouth daily 100 tablet 3      [DISCONTINUED] aspirin (ASA) 81 MG chewable tablet Chew and swallow 1 Tablet by mouth daily.                Discharge Medications:     Current Discharge Medication List      CONTINUE these medications which have NOT CHANGED    Details   aspirin (ASA) 81 MG chewable tablet Chew and swallow 1 Tablet by mouth daily.      calcium carbonate (TUMS) 500 MG chewable tablet Take 2 chew tab by mouth 2 times daily      Prenatal Vit-Fe Fumarate-FA (PRENATAL PLUS) 27-1 MG TABS Take 1 tablet by mouth daily  Qty: 100 tablet, Refills: 3    Associated Diagnoses: Supervision of high risk pregnancy in third trimester; 33 weeks gestation of pregnancy                  Brief Admission History   Haritha Bateman is a 32 year old  at 40w4d by 10w3d US admitted for LOF.  The patient reports around 1800 this evening, she was at home when she had a large gush of green tinged fluid which was confirmed on arrival.  Since this started, she has had more frequent/intense contractions.  Good fetal movement.     Haritha Bateman is a 32 year old  at 40w4d admitted for SROM in  active labor.         Plan:      Labor:   - See labor note for more details     FWB:   - Continous EFM   - Category I FHT, reactive     Prenatal Care:  - B positive, Rubella immune, HIV neg, Heb B nonreactive, RPR negative  -, Hgb 11.5 at 28 weeks   - S/p Covid vaccine and booster, and covid infection in late Dec, s/p Flu vaccine 21, Tdap 4/15/2022  - GBS negative  - Feed: breast  - Contraception: Nexplanon       History of gestational hypertension   - On PPx 81mg ASA      Covid in pregnancy  - Growth US on : EFW 38%, AC 23%, Vtx, ALINA normal, Anterior placenta    GERD:   - BID Pepcid with PRN Mylanta prior to admission.           Brief Intrapartum Course:   Haritha Bateman is a 32 year old  who was admitted at 40w4d for LOF found to be grossly ruptured with thick meconium stained fluid in latent labor.  Pregnancy complications:  - history of gHTN  - hx PP endometritis with retained membranes/placenta  - Covid in pregnancy (2021)   - Class I obesity      Shortly after admission, patient had onset of regular contractions and made adequate progress to 8/90/-1 at the time of my arrival.  Given the patient was only using NO for pain and declined other medications, pitocin augmentation was started at 2 mu/min.  Within 5 minutes, the patient was an anterior lip and began active second stage. She pushed effectively and delivered a viable female infant at 2228 with APGARs of 7 and 9 respectively which was complicated by right compound arm and tight nuchal cord that was delivered through.  Spontaneously delivery of an intact placenta with a 3-V cord ensued shortly thereafter. Trailing membranes were noted, and easily removed using a ring forceps. No additional membranes with full visualization of the cervix was noted.  She received 30 units of IV pitocin as a uterotonic agent as well as 400 mcg of buccal misoprostol given a moderate amount of retroplacental lochia at the time of placental  delivery.  Exam of the perineum revealed a 2nd degree laceration which was repaired in standard fashion using a 3-0 Vicryl suture.  QBL 398cc           Hospital Course:   The patient's hospital course was unremarkable.  On discharge, her pain was well controlled. Vaginal bleeding is similar to peak menstrual flow.  Voiding without difficulty.  Ambulating well and tolerating a normal diet.  No fever.  Breastfeeding well.  Infant is stable. She was discharged on post-partum day #2.    Acute on chronic anemia: PO iron started    Post-partum hemoglobin:   Hemoglobin   Date Value Ref Range Status   07/01/2022 9.1 (L) 11.7 - 15.7 g/dL Final   02/27/2018 10.8 (L) 11.7 - 15.7 g/dL Final             Discharge Instructions and Follow-Up:     Discharge diet: Regular   Discharge activity: Pelvic rest for 6 weeks including no sexual intercourse, tampons, or douching.   Discharge follow-up: Follow up with your primary OB for a routine postpartum visit in 6 weeks           Discharge Disposition:     Discharged to home

## 2022-07-01 NOTE — H&P
[facility] Labor and Delivery History and Physical    Haritha Bateman MRN# [id number]   Age: 32 year old YOB: 1990     Date of Admission:  2022           HPI:   Haritha Bateman is a 32 year old  at 40w4d by 10w3d US admitted for LOF.  The patient reports around 1800 this evening, she was at home when she had a large gush of green tinged fluid which was confirmed on arrival.  Since this started, she has had more frequent/intense contractions.  Good fetal movement.           Pregnancy history:     OBSTETRIC HISTORY:  OB History    Para Term  AB Living   3 3 3 0 0 3   SAB IAB Ectopic Multiple Live Births   0 0 0 0 3      # Outcome Date GA Lbr Delfin/2nd Weight Sex Delivery Anes PTL Lv   3 Term 22 40w4d  3.28 kg (7 lb 3.7 oz) F Vag-Spont Nitrous  ELEUTERIO      Name: FILIPPO,FEMALE-LULAH      Apgar1: 7  Apgar5: 9   2 Term 18 41w5d 12:36 / 01:29 3.03 kg (6 lb 10.9 oz) F Vag-Spont IV, EPI N ELEUTERIO      Name: FILIPPO,BABY1 LULAH      Apgar1: 1  Apgar5: 7   1 Term                EDC: Estimated Date of Delivery: Data Unavailable    Prenatal Labs:   Lab Results   Component Value Date    ABO B 2018    RH Pos 2018    AS Negative 2022    HEPBANG Non-reactive 2017    CHPCRT Negative 10/12/2019    GCPCRT Negative 10/12/2019    TREPAB Negative 2018    RUBELLAABIGG 8.15 2017    HGB 10.4 (L) 2022       GBS Status:   Lab Results   Component Value Date    GBS Negative 2018          Maternal Past Medical History:     Past Medical History:   Diagnosis Date     Class 1 obesity      Hypertension     H/o PP     Past Surgical History:   Procedure Laterality Date     NO HISTORY OF SURGERY  2017     WISDOM TOOTH EXTRACTION        Medications Prior to Admission   Medication Sig Dispense Refill Last Dose     aspirin (ASA) 81 MG chewable tablet Chew and swallow 1 Tablet by mouth daily.        calcium carbonate (TUMS) 500 MG chewable tablet Take 2 chew tab  by mouth 2 times daily        Prenatal Vit-Fe Fumarate-FA (PRENATAL PLUS) 27-1 MG TABS Take 1 tablet by mouth daily 100 tablet 3             Social History:     Social History     Tobacco Use     Smoking status: Never Smoker     Smokeless tobacco: Never Used   Substance Use Topics     Alcohol use: No            Review of Systems:   The Review of Systems is negative other than noted in the HPI          Physical Exam:     Patient Vitals for the past 8 hrs:   BP Temp Temp src Resp SpO2   22 2252 116/60 98.1  F (36.7  C) Oral -- --   22 2243 -- -- -- -- 100 %   227 120/58 -- -- -- --   22 1955 119/76 97.7  F (36.5  C) Oral 16 --     Gen: Pleasant, NAD, using NO for contractions   CV:  Regular rate and rhythm, no murmurs, rubs or gallops appreciated   Resp: Non-labored breathing.  Lungs clear to ausculation bilaterally   Abd: Obese, soft, non-tender and non-distended   Ext: Trace pedal edema bilaterally     Cervix: 8/90%/-1 upon my arrival   Membranes: SROM at 1800, meconium stained  EFW: 7.5 lbs.  Presentation:Cephalic    Fetal Heart Rate Tracing:   Baseline 140  Variability: Moderate  Accelerations: Present  Decelerations: None  Interpretation: reactive    Contractions: q 2-3 min per EFM        Assessment:   Haritha Bateman is a 32 year old  at 40w4d admitted for SROM in active labor.        Plan:     Labor:   - See labor note for more details    FWB:   - Continous EFM   - Category I FHT, reactive    Prenatal Care:  - B positive, Rubella immune, HIV neg, Heb B nonreactive, RPR negative  -, Hgb 11.5 at 28 weeks   - S/p Covid vaccine and booster, and covid infection in late Dec, s/p Flu vaccine 21, Tdap 4/15/2022  - GBS negative  - Feed: breast  - Contraception: Nexplanon       History of gestational hypertension   - On PPx 81mg ASA      Covid in pregnancy  - Growth US on : EFW 38%, AC 23%, Vtx, ALINA normal, Anterior placenta    GERD:   - BID Pepcid with PRN Mylanta prior  to admission.    Mireille Baker MD   Pager: [phone]   June 30, 2022, 11:08 PM

## 2022-07-01 NOTE — L&D DELIVERY NOTE
Haritha Bateman is a 32 year old  who was admitted at 40w4d for LOF found to be grossly ruptured with thick meconium stained fluid in latent labor.  Pregnancy complications:  - history of gHTN  - hx PP endometritis with retained membranes/placenta  - Covid in pregnancy (2021)   - Class I obesity      Shortly after admission, patient had onset of regular contractions and made adequate progress to 8/90/-1 at the time of my arrival.  Given the patient was only using NO for pain and declined other medications, pitocin augmentation was started at 2 mu/min.  Within 5 minutes, the patient was an anterior lip and began active second stage. She pushed effectively and delivered a viable female infant at 2228 with APGARs of 7 and 9 respectively which was complicated by right compound arm and tight nuchal cord that was delivered through.  Spontaneously delivery of an intact placenta with a 3-V cord ensued shortly thereafter. Trailing membranes were noted, and easily removed using a ring forceps. No additional membranes with full visualization of the cervix was noted.  She received 30 units of IV pitocin as a uterotonic agent as well as 400 mcg of buccal misoprostol given a moderate amount of retroplacental lochia at the time of placental delivery.  Exam of the perineum revealed a 2nd degree laceration which was repaired in standard fashion using a 3-0 Vicryl suture.  QBL 398cc.      Mireille Baker MD   Pager: [phone]   2022    Delivery Summary    Haritha Bateman MRN# [id number]   Age: 32 year old YOB: 1990          Fransico, Female-Haritha [[id number]]    Labor Length    3rd Stage (hrs): 0 (min): 6      Labor Events    Labor Type: Spontaneous     Antibiotics received during labor?: No     Rupture date/time: 22 1800   Rupture type: Spontaneous rupture of membranes occuring during spontaneous labor or augmentation  Fluid color: Meconium  Fluid odor: Normal     Augmentation: Oxytocin      Delivery/Placenta Date and Time    Delivery Date: 22 Delivery Time: 10:28 PM   Placenta Date/Time: 2022 10:35 PM  Delivering clinician: Mireille Shi MD   Other personnel present at delivery:  Provider Role   Tanya Boston, RN Delivery Nurse   Lala Carolina RN Delivery Assist   Mireille Shi MD Obstetrician         Vaginal Counts     Initial count performed by 2 team members:  Two Team Members   Dr. Lanette Martínez RN       [place] Suture Needles Sponges (RETIRED) Instruments   Initial counts 2  5    Added to count  2 5    Relief counts       Final counts 2 2 10          Placed during labor Accounted for at the end of labor   FSE No NA   IUPC No NA   Cervidil No NA              Final count performed by 2 team members:  Two Team Members   Dr. Lanette Martínez RN      Final count correct?: Yes     Apgars    Living status: Living   1 Minute 5 Minute 10 Minute 15 Minute 20 Minute   Skin color: 0  1       Heart rate: 2  2       Reflex irritability: 1  2       Muscle tone: 2  2       Respiratory effort: 2  2       Total: 7  9       Apgars assigned by: GARTH VARGHESE,BRIDGETTE     Cord    Vessels: 3 Vessels    Cord Complications: Nuchal   Nuchal Intervention: delivered through         Nuchal cord description: loose nuchal cord         Cord Blood Disposition: Lab    Gases Sent?: No    Stem cell collection?: No        Resuscitation    Methods: None  [place] Care at Delivery: NICU team called on behalf of Dr Mireille Shi for the vaginal birth of a term infant with meconium stained amniotic fluid. Infant received 1 minute of delayed cord clamping, drying & stimulation, then brought to the radiant warmer for further assessment due to her poor color and decreased response to stimulation. Infant dried & stimulated further, HR 130s, easy breathing and became vigorous. She remained alert, vigorous and moves all her extremities well. Her parents were updated. To [facility] for further  management.  Wali VARGHESE CNP 2022 10:47 PM      Measurements    Weight: 7 lb 3.7 oz       Labor Events and Shoulder Dystocia    Fetal Tracing Prior to Delivery: Category 1  Shoulder dystocia present?: Neg     Delivery (Maternal) (Provider to Complete) ([id number])    Episiotomy: None  Perineal lacerations: 2nd Repaired?: Yes   Repair suture: 3-0 Vicryl  Genital tract inspection done: Pos     Blood Loss  Mother: Haritha Bateman #[id number]   Start of Mother's Information    Delivery Blood Loss  22 1028 - 22 2302    Delivery QBL (mL) Hospital Encounter 398 mL    Total  398 mL         End of Mother's Information  Mother: Harihta Bateman #[id number]          Delivery - Provider to Complete ([id number])    Delivering clinician: Mireille Shi MD  Attempted Delivery Types (Choose all that apply): Spontaneous Vaginal Delivery  Delivery Type (Choose the 1 that will go to the Birth History): Vaginal, Spontaneous                   Other personnel:  Provider Role   Tanya Boston, RN Delivery Nurse   Lala Carolina RN Delivery Assist   Mireille Shi MD Obstetrician                Placenta    Date/Time: 2022 10:35 PM  Removal: Expressed  Disposition: Hospital disposal           Anesthesia    Method: Nitrous Oxide                Presentation and Position    Presentation: Vertex    Position: Right Occiput Anterior                 Mireille Shi MD

## 2022-07-01 NOTE — PROVIDER NOTIFICATION
22   Provider Notification   Provider Name/Title Dr. Baker   Method of Notification Phone   Request Evaluate - Remote   Notification Reason Patient Arrived;Uterine Activity;Pain;Status Update;SVE     Notified MD  pt is here for R/O SROM, GBS negative, uncomplicated pregnancy, she has a history of postpartum endometritis with her first delivery, she is grossly ruptured with meconium stained fluid, this occurred at 1800 per pt, she was bennie every 5 min at home, here she is bennie every 1.5-2 min by toco and appears uncomfortable with them, SVE 3/70/-2 which is a change from yesterday when she was 2/-3, she is not planning for an epidural.    Per MD, admit for labor with standard intrapartum order set, MD is home 20 min away, do hourly SVE as indicated and update as needed, call for delivery.

## 2022-07-02 VITALS
TEMPERATURE: 98 F | RESPIRATION RATE: 18 BRPM | OXYGEN SATURATION: 100 % | SYSTOLIC BLOOD PRESSURE: 115 MMHG | DIASTOLIC BLOOD PRESSURE: 74 MMHG | HEART RATE: 80 BPM

## 2022-07-02 PROCEDURE — 250N000013 HC RX MED GY IP 250 OP 250 PS 637: Performed by: OBSTETRICS & GYNECOLOGY

## 2022-07-02 RX ORDER — FERROUS SULFATE 325(65) MG
325 TABLET ORAL
Qty: 30 TABLET | Refills: 1 | Status: SHIPPED | OUTPATIENT
Start: 2022-07-02

## 2022-07-02 RX ORDER — FAMOTIDINE 20 MG/1
20 TABLET, FILM COATED ORAL
Qty: 60 TABLET | Refills: 1 | Status: SHIPPED | OUTPATIENT
Start: 2022-07-02

## 2022-07-02 RX ORDER — MODIFIED LANOLIN
OINTMENT (GRAM) TOPICAL
Qty: 7 G | Refills: 3 | Status: SHIPPED | OUTPATIENT
Start: 2022-07-02

## 2022-07-02 RX ADMIN — IBUPROFEN 800 MG: 800 TABLET, FILM COATED ORAL at 07:37

## 2022-07-02 RX ADMIN — IBUPROFEN 800 MG: 800 TABLET, FILM COATED ORAL at 00:22

## 2022-07-02 RX ADMIN — DOCUSATE SODIUM 100 MG: 100 CAPSULE, LIQUID FILLED ORAL at 07:38

## 2022-07-02 RX ADMIN — FAMOTIDINE 20 MG: 20 TABLET ORAL at 07:40

## 2022-07-02 RX ADMIN — Medication 1 TABLET: at 07:37

## 2022-07-02 ASSESSMENT — ACTIVITIES OF DAILY LIVING (ADL)
ADLS_ACUITY_SCORE: 18

## 2022-07-02 NOTE — PLAN OF CARE
All discharge instructions were reviewed with patient by writer and questions answered. Discharge medications were reviewed and issued to patient. Patient left unit with infant and all belongings at 1340.

## 2022-07-02 NOTE — PROGRESS NOTES
Patient Name:  Haritha Bateman   MRN:  [id number]  Age:  32 year old    YOB: 1990      POSTPARTUM PROGRESS NOTE    Pt is PPD#2 s/p vaginal delivery.  She is doing well without complaints.  Pt is ambulating, voiding, tolerating a regular diet.  Pain is well controlled and lochia is within normal limits.  She is breast and bottle feeding.  Baby is doing well.    She denies further palpitations when getting up from bed.  Denies dizziness or lightheadedness. Reports GERD. Has tums and pepcid at home.    Has constipation and desires docusate for home.  Does not have iron at home, understands can take every other day.    Objective:    Temp:  [97.6  F (36.4  C)-98  F (36.7  C)] 98  F (36.7  C)  Pulse:  [80-87] 80  Resp:  [16-18] 18  BP: (111-115)/(73-74) 115/74  0 lbs 0 oz    General Appearance:  NAD  Lungs:  unlabored  Cardiovascular:  RR  Abdomen:  nontender, nondistended  Fundus:  firm, below the umbilicus      Lower extremities:  trace symmetric edema    Lab Review:    ABO/RH(D)   Date Value Ref Range Status   06/30/2022 B POS  Final     Hemoglobin   Date Value Ref Range Status   07/01/2022 9.1 (L) 11.7 - 15.7 g/dL Final   06/11/2022 10.4 (L) 11.7 - 15.7 g/dL Final   05/05/2022 10.6 (L) 11.7 - 15.7 g/dL Final   02/27/2018 10.8 (L) 11.7 - 15.7 g/dL Final   02/26/2018 12.2 11.7 - 15.7 g/dL Final   02/24/2018 11.7 11.7 - 15.7 g/dL Final     Hematocrit   Date Value Ref Range Status   06/11/2022 32.2 (L) 35.0 - 47.0 % Final   05/05/2022 31.6 (L) 35.0 - 47.0 % Final   12/14/2021 40.4 35.0 - 47.0 % Final   02/27/2018 31.8 (L) 35.0 - 47.0 % Final   02/26/2018 36.0 35.0 - 47.0 % Final   02/24/2018 34.2 (L) 35.0 - 47.0 % Final       Lab Results   Component Value Date    WBC 6.3 06/11/2022    WBC 7.3 02/27/2018     Lab Results   Component Value Date    RBC 3.62 06/11/2022    RBC 3.45 02/27/2018     Lab Results   Component Value Date    HGB 9.1 07/01/2022    HGB 10.8 02/27/2018     Lab Results   Component Value Date     HCT 32.2 2022    HCT 31.8 2018     No components found for: MCT  Lab Results   Component Value Date    MCV 89 2022    MCV 92 2018     Lab Results   Component Value Date    MCH 28.7 2022    MCH 31.3 2018     Lab Results   Component Value Date    MCHC 32.3 2022    MCHC 34.0 2018     Lab Results   Component Value Date    RDW 13.5 2022    RDW 15.6 2018     Lab Results   Component Value Date     2022     2018       Assessment:  31yo  PPD#2 s/p vaginal delivery, doing well.    Plan:   - Postpartum: recovering well. Pain well controlled. Cont PO pain meds and regular diet. Encourage ambulation.  - Acute on chronic anemia: PO iron started.  Encouraged PO hydration.  Recommended slowly getting out of bed.  No indication for transfusion at this time.   - h/o GHTN in previous preg: Bps normal with no s/sx of preE  - Contraception: Nexplanon  - Dispo: anticipate DC PPD#2

## 2022-07-02 NOTE — CARE PLAN
Vital signs within normal limits. Postpartum checks within normal limits. Patient eating and drinking normally. Patient able to empty bladder independently and is up ambulating. No apparent signs of infection. Patient performing self cares and is able to care for infant. Patient medicated during the shift for cramping. Positive attachment behaviors observed with infant. Breastfeeding and formula feeding baby well. Support persons Mohamed present and attentive. Anticipate discharge today.

## 2022-07-02 NOTE — DISCHARGE INSTRUCTIONS
"Postpartum Discharge Instructions  Follow up in 6 weeks for Postpartum visit.      ACTIVITY:  - You may ride in a car, but no driving for 1-2 weeks.  - Do not lift anything heavier than your baby for 6 weeks.  - You may slowly go up and down stairs as you feel able.  - Resume other exercises after 6 weeks.  - Rest when your baby is sleeping.  - Call your doctor if you are feeling \"blue\" for more than 2 weeks.  - Call your doctor immediately or go the Emergency Center if you think you might hurt yourself or your baby.  HYGIENE:  - You may take a tub bath or shower.  - Continue using a michelle bottle or sitz bath for comfort or cleanliness.  - No douching or tampon use until after 6 week checkup.  DIET:  - Wait 6 weeks before dieting to lose weight.  - Aim for gradual weight loss through healthy eating habits.  - Take a vitamin daily unless otherwise directed.  BREASTFEEDING:  - Refer to Breastfeeding Guidelines booklet or call Breastfeeding support [place]: [phone]  MEDICATIONS:  - Use as directed on prescription.  PAIN MANAGEMENT:    Breast Care:  - If not breastfeeding, apply ice packs to your breasts 3 times per day for 15 minutes.  Wear a tight bra for at least one week.  - If breastfeeding, nurse often to get relief, pain medication as directed.  IF Laceration:  - Continue use of michelle bottle and sitz bath as directed.  - Use Dermoplast and/or Tucks as directed.  IF  Incision:  - Splint incision when moving and turning.  - Medications as instructed.  SPECIAL INFORMATION:  - No sexual intercourse for 6 weeks.  After that, use a barrier contraception until your doctor tells you it is ok to use something different.     - Breastfeeding is not a method of birth control.  - You may have a period while breastfeeding.  Your first period may come 4 to 10 weeks after delivery, or later if you are breastfeeding.  - Avoid constipation.  Drink plenty of water, eat vegetables and fruits high in natural fiber, high " grain breads and cereals.  You may use a stool softener as necessary.  COMPLICATIONS:  Call you doctor if any of the following occur:  - Continuing bright red vaginal bleeding or clots larger than a lemon.  - Pain or redness in the breasts.  - Fever over 100.4 when temperature is taken by mouth.  - Burning feeling with urination.  - Bad smelling vaginal drainage.  - Incision or episiotomy pulls apart, is red or has drainage.    Postpartum Vaginal Delivery Instructions    Activity     Ask family and friends for help when you need it.  Do not place anything in your vagina for 6 weeks.  You are not restricted on other activities, but take it easy for a few weeks to allow your body to recover from delivery.  You are able to do any activities you feel up to that point.  No driving until you have stopped taking your pain medications (usually two weeks after delivery).     Call your health care provider if you have any of these symptoms:     Increased pain, swelling, redness, or fluid around your stiches from an episiotomy or perineal tear.  A fever above 100.4 F (38 C) with or without chills when placing a thermometer under your tongue.  You soak a sanitary pad with blood within 1 hour, or you see blood clots larger than a golf ball.  Bleeding that lasts more than 6 weeks.  Vaginal discharge that smells bad.  Severe pain, cramping or tenderness in your lower belly area.  A need to urinate more frequently (use the toilet more often), more urgently (use the toilet very quickly), or it burns when you urinate.  Nausea and vomiting.  Redness, swelling or pain around a vein in your leg.  Problems breastfeeding or a red or painful area on your breast.  Chest pain and cough or are gasping for air.  Problems coping with sadness, anxiety, or depression.  If you have any concerns about hurting yourself or the baby, call your provider immediately.   You have questions or concerns after you return home.     Keep your hands  clean:  Always wash your hands before touching your perineal area and stitches.  This helps reduce your risk of infection.  If your hands aren't dirty, you may use an alcohol hand-rub to clean your hands. Keep your nails clean and short.

## 2022-07-02 NOTE — PLAN OF CARE
Patient meeting expected goals. Is up independent in room, meeting all personal and infant needs. VSS. Pain is being managed with Ibuprofen with PRN Tylenol also available. Using Tucks and ice packs for perineum discomfort. Breastfeeding is going well; good latch observed. Small blister in middle of right nipple; using mother love and brought in gel pads, with discussion on how to use. Also supplementing infant with formula after BF. Encouraged to start pumping. Has breast pump at home. Patient is stable and will be ready for discharge to home later today. PPD s/sx reviewed 0/10; copy issued. Guided patient to review screen weekly and call MD if answers are changing; patient agreed. Usual follow up for PP is 6 weeks, but guided patient to call to be seen sooner if having mood concerns; patient agreed.  Bonding well with infant. Spouse is at bedside; supportive.

## 2022-09-17 ENCOUNTER — HEALTH MAINTENANCE LETTER (OUTPATIENT)
Age: 32
End: 2022-09-17

## 2023-06-04 ENCOUNTER — HEALTH MAINTENANCE LETTER (OUTPATIENT)
Age: 33
End: 2023-06-04

## 2024-07-13 ENCOUNTER — HEALTH MAINTENANCE LETTER (OUTPATIENT)
Age: 34
End: 2024-07-13
